# Patient Record
Sex: MALE | Race: WHITE | NOT HISPANIC OR LATINO | Employment: OTHER | URBAN - METROPOLITAN AREA
[De-identification: names, ages, dates, MRNs, and addresses within clinical notes are randomized per-mention and may not be internally consistent; named-entity substitution may affect disease eponyms.]

---

## 2017-04-22 ENCOUNTER — HOSPITAL ENCOUNTER (OUTPATIENT)
Dept: LAB | Facility: MEDICAL CENTER | Age: 72
End: 2017-04-22
Attending: FAMILY MEDICINE
Payer: MEDICARE

## 2017-04-22 LAB
ALBUMIN SERPL BCP-MCNC: 3.8 G/DL (ref 3.2–4.9)
ALBUMIN/GLOB SERPL: 1.6 G/DL
ALP SERPL-CCNC: 43 U/L (ref 30–99)
ALT SERPL-CCNC: 16 U/L (ref 2–50)
ANION GAP SERPL CALC-SCNC: 6 MMOL/L (ref 0–11.9)
ANISOCYTOSIS BLD QL SMEAR: ABNORMAL
AST SERPL-CCNC: 18 U/L (ref 12–45)
BASOPHILS # BLD AUTO: 0 % (ref 0–1.8)
BASOPHILS # BLD: 0 K/UL (ref 0–0.12)
BILIRUB SERPL-MCNC: 1.1 MG/DL (ref 0.1–1.5)
BUN SERPL-MCNC: 22 MG/DL (ref 8–22)
CALCIUM SERPL-MCNC: 8.9 MG/DL (ref 8.5–10.5)
CHLORIDE SERPL-SCNC: 107 MMOL/L (ref 96–112)
CHOLEST SERPL-MCNC: 195 MG/DL (ref 100–199)
CO2 SERPL-SCNC: 27 MMOL/L (ref 20–33)
CREAT SERPL-MCNC: 1.43 MG/DL (ref 0.5–1.4)
EOSINOPHIL # BLD AUTO: 0.08 K/UL (ref 0–0.51)
EOSINOPHIL NFR BLD: 1.8 % (ref 0–6.9)
ERYTHROCYTE [DISTWIDTH] IN BLOOD BY AUTOMATED COUNT: 48 FL (ref 35.9–50)
FERRITIN SERPL-MCNC: 24.2 NG/ML (ref 22–322)
GFR SERPL CREATININE-BSD FRML MDRD: 49 ML/MIN/1.73 M 2
GLOBULIN SER CALC-MCNC: 2.4 G/DL (ref 1.9–3.5)
GLUCOSE SERPL-MCNC: 94 MG/DL (ref 65–99)
HCT VFR BLD AUTO: 41.8 % (ref 42–52)
HDLC SERPL-MCNC: 81 MG/DL
HGB BLD-MCNC: 13.8 G/DL (ref 14–18)
IMM GRANULOCYTES # BLD AUTO: 0.01 K/UL (ref 0–0.11)
IMM GRANULOCYTES NFR BLD AUTO: 0.2 % (ref 0–0.9)
IRON SERPL-MCNC: 227 UG/DL (ref 50–180)
LDLC SERPL CALC-MCNC: 100 MG/DL
LYMPHOCYTES # BLD AUTO: 1.27 K/UL (ref 1–4.8)
LYMPHOCYTES NFR BLD: 27.7 % (ref 22–41)
MACROCYTES BLD QL SMEAR: ABNORMAL
MANUAL DIFF BLD: NORMAL
MCH RBC QN AUTO: 31.9 PG (ref 27–33)
MCHC RBC AUTO-ENTMCNC: 33 G/DL (ref 33.7–35.3)
MCV RBC AUTO: 96.5 FL (ref 81.4–97.8)
MONOCYTES # BLD AUTO: 0.12 K/UL (ref 0–0.85)
MONOCYTES NFR BLD AUTO: 2.7 % (ref 0–13.4)
NEUTROPHILS # BLD AUTO: 2.72 K/UL (ref 1.82–7.42)
NEUTROPHILS NFR BLD: 67.8 % (ref 44–72)
NRBC # BLD AUTO: 0 K/UL
NRBC BLD AUTO-RTO: 0 /100 WBC
PLATELET # BLD AUTO: 273 K/UL (ref 164–446)
PMV BLD AUTO: 10.6 FL (ref 9–12.9)
POTASSIUM SERPL-SCNC: 4.1 MMOL/L (ref 3.6–5.5)
PROT SERPL-MCNC: 6.2 G/DL (ref 6–8.2)
RBC # BLD AUTO: 4.33 M/UL (ref 4.7–6.1)
SODIUM SERPL-SCNC: 140 MMOL/L (ref 135–145)
T4 FREE SERPL-MCNC: 1.43 NG/DL (ref 0.53–1.43)
TRIGL SERPL-MCNC: 68 MG/DL (ref 0–149)
TSH SERPL DL<=0.005 MIU/L-ACNC: 3.72 UIU/ML (ref 0.3–3.7)
VIT B12 SERPL-MCNC: 290 PG/ML (ref 211–911)
WBC # BLD AUTO: 4.6 K/UL (ref 4.8–10.8)

## 2017-04-22 PROCEDURE — 36415 COLL VENOUS BLD VENIPUNCTURE: CPT

## 2017-04-22 PROCEDURE — 83540 ASSAY OF IRON: CPT

## 2017-04-22 PROCEDURE — 85007 BL SMEAR W/DIFF WBC COUNT: CPT

## 2017-04-22 PROCEDURE — 84443 ASSAY THYROID STIM HORMONE: CPT

## 2017-04-22 PROCEDURE — 82607 VITAMIN B-12: CPT

## 2017-04-22 PROCEDURE — 80053 COMPREHEN METABOLIC PANEL: CPT

## 2017-04-22 PROCEDURE — 82728 ASSAY OF FERRITIN: CPT

## 2017-04-22 PROCEDURE — 80061 LIPID PANEL: CPT

## 2017-04-22 PROCEDURE — 84439 ASSAY OF FREE THYROXINE: CPT

## 2017-04-22 PROCEDURE — 85027 COMPLETE CBC AUTOMATED: CPT

## 2017-05-03 ENCOUNTER — HOSPITAL ENCOUNTER (OUTPATIENT)
Facility: MEDICAL CENTER | Age: 72
End: 2017-05-03
Attending: FAMILY MEDICINE
Payer: MEDICARE

## 2017-05-25 ENCOUNTER — HOSPITAL ENCOUNTER (OUTPATIENT)
Dept: LAB | Facility: MEDICAL CENTER | Age: 72
End: 2017-05-25
Attending: FAMILY MEDICINE
Payer: MEDICARE

## 2017-05-25 ENCOUNTER — OFFICE VISIT (OUTPATIENT)
Dept: CARDIOLOGY | Facility: MEDICAL CENTER | Age: 72
End: 2017-05-25
Payer: MEDICARE

## 2017-05-25 VITALS
DIASTOLIC BLOOD PRESSURE: 80 MMHG | BODY MASS INDEX: 25.67 KG/M2 | HEIGHT: 74 IN | SYSTOLIC BLOOD PRESSURE: 150 MMHG | WEIGHT: 200 LBS | HEART RATE: 60 BPM | OXYGEN SATURATION: 96 %

## 2017-05-25 DIAGNOSIS — I10 ESSENTIAL HYPERTENSION: ICD-10-CM

## 2017-05-25 DIAGNOSIS — I48.19 PERSISTENT ATRIAL FIBRILLATION (HCC): ICD-10-CM

## 2017-05-25 DIAGNOSIS — I77.810 AORTIC ECTASIA, THORACIC (HCC): ICD-10-CM

## 2017-05-25 LAB
BASOPHILS # BLD AUTO: 0.5 % (ref 0–1.8)
BASOPHILS # BLD: 0.03 K/UL (ref 0–0.12)
EOSINOPHIL # BLD AUTO: 0.16 K/UL (ref 0–0.51)
EOSINOPHIL NFR BLD: 2.8 % (ref 0–6.9)
ERYTHROCYTE [DISTWIDTH] IN BLOOD BY AUTOMATED COUNT: 47.8 FL (ref 35.9–50)
ERYTHROCYTE [SEDIMENTATION RATE] IN BLOOD BY WESTERGREN METHOD: 3 MM/HOUR (ref 0–20)
FERRITIN SERPL-MCNC: 22 NG/ML (ref 22–322)
HCT VFR BLD AUTO: 38.4 % (ref 42–52)
HGB BLD-MCNC: 12.8 G/DL (ref 14–18)
IMM GRANULOCYTES # BLD AUTO: 0.03 K/UL (ref 0–0.11)
IMM GRANULOCYTES NFR BLD AUTO: 0.5 % (ref 0–0.9)
IRON SATN MFR SERPL: 27 % (ref 15–55)
IRON SERPL-MCNC: 91 UG/DL (ref 50–180)
LYMPHOCYTES # BLD AUTO: 0.95 K/UL (ref 1–4.8)
LYMPHOCYTES NFR BLD: 16.8 % (ref 22–41)
MCH RBC QN AUTO: 32.4 PG (ref 27–33)
MCHC RBC AUTO-ENTMCNC: 33.3 G/DL (ref 33.7–35.3)
MCV RBC AUTO: 97.2 FL (ref 81.4–97.8)
MONOCYTES # BLD AUTO: 0.37 K/UL (ref 0–0.85)
MONOCYTES NFR BLD AUTO: 6.5 % (ref 0–13.4)
NEUTROPHILS # BLD AUTO: 4.13 K/UL (ref 1.82–7.42)
NEUTROPHILS NFR BLD: 72.9 % (ref 44–72)
NRBC # BLD AUTO: 0 K/UL
NRBC BLD AUTO-RTO: 0 /100 WBC
PLATELET # BLD AUTO: 252 K/UL (ref 164–446)
PMV BLD AUTO: 10.9 FL (ref 9–12.9)
RBC # BLD AUTO: 3.95 M/UL (ref 4.7–6.1)
TIBC SERPL-MCNC: 333 UG/DL (ref 250–450)
WBC # BLD AUTO: 5.7 K/UL (ref 4.8–10.8)

## 2017-05-25 PROCEDURE — 83550 IRON BINDING TEST: CPT

## 2017-05-25 PROCEDURE — 4040F PNEUMOC VAC/ADMIN/RCVD: CPT | Mod: 8P | Performed by: INTERNAL MEDICINE

## 2017-05-25 PROCEDURE — 99214 OFFICE O/P EST MOD 30 MIN: CPT | Performed by: INTERNAL MEDICINE

## 2017-05-25 PROCEDURE — 3017F COLORECTAL CA SCREEN DOC REV: CPT | Mod: 8P | Performed by: INTERNAL MEDICINE

## 2017-05-25 PROCEDURE — 85025 COMPLETE CBC W/AUTO DIFF WBC: CPT

## 2017-05-25 PROCEDURE — 83540 ASSAY OF IRON: CPT

## 2017-05-25 PROCEDURE — 85652 RBC SED RATE AUTOMATED: CPT

## 2017-05-25 PROCEDURE — G8432 DEP SCR NOT DOC, RNG: HCPCS | Performed by: INTERNAL MEDICINE

## 2017-05-25 PROCEDURE — 1101F PT FALLS ASSESS-DOCD LE1/YR: CPT | Mod: 8P | Performed by: INTERNAL MEDICINE

## 2017-05-25 PROCEDURE — 1036F TOBACCO NON-USER: CPT | Performed by: INTERNAL MEDICINE

## 2017-05-25 PROCEDURE — 82728 ASSAY OF FERRITIN: CPT

## 2017-05-25 PROCEDURE — 36415 COLL VENOUS BLD VENIPUNCTURE: CPT

## 2017-05-25 PROCEDURE — G8419 CALC BMI OUT NRM PARAM NOF/U: HCPCS | Performed by: INTERNAL MEDICINE

## 2017-05-25 RX ORDER — MELOXICAM 15 MG/1
15 TABLET ORAL DAILY
COMMUNITY
End: 2018-06-25

## 2017-05-25 ASSESSMENT — ENCOUNTER SYMPTOMS
WEIGHT LOSS: 0
NERVOUS/ANXIOUS: 0
NAUSEA: 0
DIZZINESS: 0
COUGH: 0
HEARTBURN: 0
LOSS OF CONSCIOUSNESS: 0
SHORTNESS OF BREATH: 0
HEADACHES: 0
INSOMNIA: 0
BRUISES/BLEEDS EASILY: 1
MYALGIAS: 0

## 2017-05-25 NOTE — PROGRESS NOTES
Subjective:   Giovany Kruger is a 72 y.o. male who presents today in follow-up in regards to his aortic ectasia in the setting of persistent atrial fibrillation and hypertension    Doing very well, leading to [place]  His home blood pressures are always less than 130 and he has been watching his diet, somewhat stressed out today, he has carpal tunnel syndrome and has been moving a lot. Did take his medicines    No setbacks, still walking a lot and hiking    Past Medical History   Diagnosis Date   • Hypertension    • Arrhythmia    • ASTHMA    • Stroke (CMS-HCC) 1980   • Unspecified disorder of thyroid    • Atrial fibrillation (CMS-[facility])    • Peripheral vascular disease, unspecified (CMS-[facility])      Past Surgical History   Procedure Laterality Date   • Other  1980     C1-2-3 fusion   • Other  2009     L3-4-5 susion   • Tonsillectomy  1952   • Inguinal hernia repair  11/3/2011     Performed by RIGOBERTO MCINTYRE at SURGERY [facility]   • Inguinal hernia repair  9/5/2012     Performed by RIGOBERTO MCINTYRE at SURGERY [facility] ORS     Family History   Problem Relation Age of Onset   • Cancer       History   Smoking status   • Former Smoker -- 1.00 packs/day for 20 years   • Types: Cigarettes   • Quit date: 01/01/1980   Smokeless tobacco   • Never Used     Allergies   Allergen Reactions   • Tape      Blisters and scarring     Outpatient Encounter Prescriptions as of 5/25/2017   Medication Sig Dispense Refill   • meloxicam (MOBIC) 15 MG tablet Take 15 mg by mouth every day.     • amiodarone (CORDARONE) 200 MG Tab Take 1 Tab by mouth every day. 90 Tab 3   • levothyroxine (SYNTHROID) 88 MCG Tab Take 88 mcg by mouth Every morning on an empty stomach.     • Glucosamine-Chondroit-Vit C-Mn (GLUCOSAMINE 1500 COMPLEX) Cap Take  by mouth.     • losartan-hydrochlorothiazide (HYZAAR) 50-12.5 MG per tablet Take 1 Tab by mouth every day.     • Fluticasone Propionate, Inhal, (FLOVENT DISKUS) 100 MCG/BLIST AEPB Inhale  by mouth.    "  • Multiple Vitamin (MULTIVITAMIN PO) Take  by mouth every day.     • aspirin (ASA) 325 MG TABS Take 325 mg by mouth every day.       No facility-administered encounter medications on file as of 5/25/2017.     Review of Systems   Constitutional: Negative for weight loss and malaise/fatigue.   HENT: Negative for congestion.    Respiratory: Negative for cough and shortness of breath.    Cardiovascular: Negative for chest pain and leg swelling.   Gastrointestinal: Negative for heartburn and nausea.   Musculoskeletal: Negative for myalgias (taking glucosamine which helps) and joint pain.   Neurological: Negative for dizziness, loss of consciousness and headaches.   Endo/Heme/Allergies: Bruises/bleeds easily.   Psychiatric/Behavioral: The patient is not nervous/anxious and does not have insomnia.    All other systems reviewed and are negative.       Objective:   /80 mmHg  Pulse 60  Ht 1.88 m (6' 2\")  Wt 90.719 kg (200 lb)  BMI 25.67 kg/m2  SpO2 96%    Physical Exam   Constitutional: He is oriented to person, place, and time. He appears well-nourished.   HENT:   Head: Normocephalic and atraumatic.   Eyes: EOM are normal. Pupils are equal, round, and reactive to light.   Neck: No JVD present. No tracheal deviation present.   Cardiovascular: Regular rhythm and intact distal pulses.    No murmur heard.  Pulmonary/Chest: Effort normal and breath sounds normal. No respiratory distress.   Musculoskeletal: Normal range of motion. He exhibits no edema.   Neurological: He is alert and oriented to person, place, and time.   Skin: Skin is warm and dry.   Psychiatric: He has a normal mood and affect. His behavior is normal.       Assessment:     1. Aortic ectasia, thoracic (CMS-HCC)     2. Persistent atrial fibrillation (CMS-[facility])     3. Essential hypertension         Medical Decision Making:  Today's Assessment / Status / Plan:     Aortic aneurysm, we reviewed his echocardiogram together and the aortic root and ascending " aorta are well seen, . We also reviewed his CAT scan from 2012. He has had no interval change in the imaging quality is quite good. We talked about strict blood pressure control and diet again.  Echo 2018    Hyperlipidemia, in the setting of aortic ectasia. His lipid panel has been excellent and his LDL was then less than 70. He is not tolerated high-dose statins, or any statins and has been maintained on fish oil and aspirin. He has not had any severe bleeding or bruising. It would be reasonable to take a baby aspirin if he needs    Atrial fibrillation, in remission on antiarrhythmic therapy. He is due for screening 2018 and this was ordered. TSH, LFTs and chest x-ray reviewed from this year. He gets his eyes checked annually    RTC 1y with testing

## 2017-05-25 NOTE — MR AVS SNAPSHOT
"        Erwinmary ODTY Saskia   2017 10:15 AM   Office Visit   MRN: [id number]    Department:  [facility]   Dept Phone:  [phone]    Description:  Male : 1945   Provider:  Hyun Mendiola M.D.           Reason for Visit     Follow-Up           Allergies as of 2017     Allergen Noted Reactions    Tape 2012       Blisters and scarring      Vital Signs     Blood Pressure Pulse Height Weight Body Mass Index Oxygen Saturation    150/80 mmHg 60 1.88 m (6' 2\") 90.719 kg (200 lb) 25.67 kg/m2 96%    Smoking Status                   Former Smoker           Basic Information     Date Of Birth Sex Race Ethnicity Preferred Language    1945 Male White Non- English      Problem List              ICD-10-CM Priority Class Noted - Resolved    Atrial fibrillation, amio  I48.91   2011 - Present    Osteoarthritis, spinal fusion  M19.90   2011 - Present    Aortic ectasia, thoracic (CMS-HCC) I77.810   2011 - Present    HTN (hypertension) I10   2012 - Present    Inguinal hernia K40.90   2012 - Present    Hypothyroid E03.9   2014 - Present      Health Maintenance        Date Due Completion Dates    IMM DTaP/Tdap/Td Vaccine (1 - Tdap) 1965 ---    COLONOSCOPY 1996 ---    IMM ZOSTER VACCINE 2006 ---    IMM PNEUMOCOCCAL 65+ (ADULT) LOW/MEDIUM RISK SERIES (1 of 2 - PCV13) 2011 ---            Current Immunizations     No immunizations on file.      Below and/or attached are the medications your provider expects you to take. Review all of your home medications and newly ordered medications with your provider and/or pharmacist. Follow medication instructions as directed by your provider and/or pharmacist. Please keep your medication list with you and share with your provider. Update the information when medications are discontinued, doses are changed, or new medications (including over-the-counter products) are added; and carry medication " information at all times in the event of emergency situations     Allergies:  TAPE - (reactions not documented)               Medications  Valid as of: May 25, 2017 - 10:50 AM    Generic Name Brand Name Tablet Size Instructions for use    Amiodarone HCl (Tab) CORDARONE 200 MG Take 1 Tab by mouth every day.        Aspirin (Tab)  MG Take 325 mg by mouth every day.        Fluticasone Propionate (Inhal) (AEROSOL POWDER, BREATH ACTIVATED) Fluticasone Propionate (Inhal) 100 MCG/BLIST Inhale  by mouth.        Glucosamine-Chondroit-Vit C-Mn (Cap) GLUCOSAMINE 1500 COMPLEX  Take  by mouth.        Levothyroxine Sodium (Tab) SYNTHROID 88 MCG Take 88 mcg by mouth Every morning on an empty stomach.        Losartan Potassium-HCTZ (Tab) HYZAAR 50-12.5 MG Take 1 Tab by mouth every day.        Meloxicam (Tab) MOBIC 15 MG Take 15 mg by mouth every day.        Multiple Vitamins-Minerals   Take  by mouth every day.        .                 Medicines prescribed today were sent to:     [facility] [address] [place] [address]), [place] - [address] () [place] [address]    Phone: [phone] Fax: [phone]    Open 24 Hours?: No    [facility] - [facility] [address] [facility]    [address] [facility] [address]    Phone: [phone] Fax: [phone]    Open 24 Hours?: No      Medication refill instructions:       If your prescription bottle indicates you have medication refills left, it is not necessary to call your provider’s office. Please contact your pharmacy and they will refill your medication.    If your prescription bottle indicates you do not have any refills left, you may request refills at any time through one of the following ways: The online [employer] system (except Urgent Care), by calling your provider’s office, or by asking your pharmacy to contact your provider’s office with a refill request. Medication refills are processed only during regular business hours and  may not be available until the next business day. Your provider may request additional information or to have a follow-up visit with you prior to refilling your medication.   *Please Note: Medication refills are assigned a new Rx number when refilled electronically. Your pharmacy may indicate that no refills were authorized even though a new prescription for the same medication is available at the pharmacy. Please request the medicine by name with the pharmacy before contacting your provider for a refill.           [employer] Access Code: Activation code not generated  Current [facility] Status: Active

## 2017-05-26 ENCOUNTER — HOSPITAL ENCOUNTER (OUTPATIENT)
Facility: MEDICAL CENTER | Age: 72
End: 2017-05-26
Attending: FAMILY MEDICINE
Payer: MEDICARE

## 2017-05-26 LAB — HEMOCCULT STL QL: NEGATIVE

## 2017-05-26 PROCEDURE — 82272 OCCULT BLD FECES 1-3 TESTS: CPT

## 2018-01-11 ENCOUNTER — HOSPITAL ENCOUNTER (OUTPATIENT)
Dept: LAB | Facility: MEDICAL CENTER | Age: 73
End: 2018-01-11
Attending: FAMILY MEDICINE
Payer: MEDICARE

## 2018-01-11 LAB
ALBUMIN SERPL BCP-MCNC: 3.9 G/DL (ref 3.2–4.9)
ALBUMIN/GLOB SERPL: 1.6 G/DL
ALP SERPL-CCNC: 46 U/L (ref 30–99)
ALT SERPL-CCNC: 17 U/L (ref 2–50)
ANION GAP SERPL CALC-SCNC: 5 MMOL/L (ref 0–11.9)
AST SERPL-CCNC: 17 U/L (ref 12–45)
BASOPHILS # BLD AUTO: 0.5 % (ref 0–1.8)
BASOPHILS # BLD: 0.03 K/UL (ref 0–0.12)
BILIRUB SERPL-MCNC: 0.6 MG/DL (ref 0.1–1.5)
BNP SERPL-MCNC: 52 PG/ML (ref 0–100)
BUN SERPL-MCNC: 23 MG/DL (ref 8–22)
CALCIUM SERPL-MCNC: 9 MG/DL (ref 8.5–10.5)
CHLORIDE SERPL-SCNC: 105 MMOL/L (ref 96–112)
CO2 SERPL-SCNC: 28 MMOL/L (ref 20–33)
CREAT SERPL-MCNC: 1.48 MG/DL (ref 0.5–1.4)
EOSINOPHIL # BLD AUTO: 0.22 K/UL (ref 0–0.51)
EOSINOPHIL NFR BLD: 3.5 % (ref 0–6.9)
ERYTHROCYTE [DISTWIDTH] IN BLOOD BY AUTOMATED COUNT: 47.8 FL (ref 35.9–50)
GLOBULIN SER CALC-MCNC: 2.5 G/DL (ref 1.9–3.5)
GLUCOSE SERPL-MCNC: 104 MG/DL (ref 65–99)
HCT VFR BLD AUTO: 41.4 % (ref 42–52)
HGB BLD-MCNC: 13.8 G/DL (ref 14–18)
IMM GRANULOCYTES # BLD AUTO: 0.02 K/UL (ref 0–0.11)
IMM GRANULOCYTES NFR BLD AUTO: 0.3 % (ref 0–0.9)
LYMPHOCYTES # BLD AUTO: 1.15 K/UL (ref 1–4.8)
LYMPHOCYTES NFR BLD: 18.5 % (ref 22–41)
MCH RBC QN AUTO: 32.1 PG (ref 27–33)
MCHC RBC AUTO-ENTMCNC: 33.3 G/DL (ref 33.7–35.3)
MCV RBC AUTO: 96.3 FL (ref 81.4–97.8)
MONOCYTES # BLD AUTO: 0.4 K/UL (ref 0–0.85)
MONOCYTES NFR BLD AUTO: 6.5 % (ref 0–13.4)
NEUTROPHILS # BLD AUTO: 4.38 K/UL (ref 1.82–7.42)
NEUTROPHILS NFR BLD: 70.7 % (ref 44–72)
NRBC # BLD AUTO: 0 K/UL
NRBC BLD-RTO: 0 /100 WBC
PLATELET # BLD AUTO: 295 K/UL (ref 164–446)
PMV BLD AUTO: 10.5 FL (ref 9–12.9)
POTASSIUM SERPL-SCNC: 4 MMOL/L (ref 3.6–5.5)
PROT SERPL-MCNC: 6.4 G/DL (ref 6–8.2)
RBC # BLD AUTO: 4.3 M/UL (ref 4.7–6.1)
SODIUM SERPL-SCNC: 138 MMOL/L (ref 135–145)
WBC # BLD AUTO: 6.2 K/UL (ref 4.8–10.8)

## 2018-01-11 PROCEDURE — 80053 COMPREHEN METABOLIC PANEL: CPT

## 2018-01-11 PROCEDURE — 83880 ASSAY OF NATRIURETIC PEPTIDE: CPT | Mod: GZ

## 2018-01-11 PROCEDURE — 36415 COLL VENOUS BLD VENIPUNCTURE: CPT | Mod: GZ

## 2018-01-11 PROCEDURE — 84439 ASSAY OF FREE THYROXINE: CPT

## 2018-01-11 PROCEDURE — 82306 VITAMIN D 25 HYDROXY: CPT | Mod: GZ

## 2018-01-11 PROCEDURE — 82607 VITAMIN B-12: CPT

## 2018-01-11 PROCEDURE — 82728 ASSAY OF FERRITIN: CPT

## 2018-01-11 PROCEDURE — 85025 COMPLETE CBC W/AUTO DIFF WBC: CPT

## 2018-01-12 LAB
25(OH)D3 SERPL-MCNC: 33 NG/ML (ref 30–100)
FERRITIN SERPL-MCNC: 28.5 NG/ML (ref 22–322)
T4 FREE SERPL-MCNC: 1.24 NG/DL (ref 0.53–1.43)
VIT B12 SERPL-MCNC: 321 PG/ML (ref 211–911)

## 2018-01-24 ENCOUNTER — TELEPHONE (OUTPATIENT)
Dept: CARDIOLOGY | Facility: MEDICAL CENTER | Age: 73
End: 2018-01-24

## 2018-01-24 ENCOUNTER — HOSPITAL ENCOUNTER (OUTPATIENT)
Dept: RADIOLOGY | Facility: MEDICAL CENTER | Age: 73
End: 2018-01-24
Attending: FAMILY MEDICINE
Payer: MEDICARE

## 2018-01-24 ENCOUNTER — HOSPITAL ENCOUNTER (OUTPATIENT)
Dept: CARDIOLOGY | Facility: MEDICAL CENTER | Age: 73
End: 2018-01-24
Attending: FAMILY MEDICINE
Payer: MEDICARE

## 2018-01-24 DIAGNOSIS — R06.9 UNSPECIFIED ABNORMALITIES OF BREATHING: ICD-10-CM

## 2018-01-24 DIAGNOSIS — R06.9 ABNORMAL RESPIRATORY RATE: ICD-10-CM

## 2018-01-24 LAB
LV EJECT FRACT  99904: 65
LV EJECT FRACT MOD 2C 99903: 80.41
LV EJECT FRACT MOD 4C 99902: 77.26
LV EJECT FRACT MOD BP 99901: 78.04

## 2018-01-24 PROCEDURE — 93306 TTE W/DOPPLER COMPLETE: CPT | Mod: 26 | Performed by: INTERNAL MEDICINE

## 2018-01-24 PROCEDURE — 71046 X-RAY EXAM CHEST 2 VIEWS: CPT

## 2018-01-24 PROCEDURE — 93306 TTE W/DOPPLER COMPLETE: CPT

## 2018-02-05 ENCOUNTER — OFFICE VISIT (OUTPATIENT)
Dept: PULMONOLOGY | Facility: HOSPICE | Age: 73
End: 2018-02-05
Payer: MEDICARE

## 2018-02-05 VITALS
SYSTOLIC BLOOD PRESSURE: 112 MMHG | TEMPERATURE: 97.8 F | HEIGHT: 74 IN | WEIGHT: 194.8 LBS | OXYGEN SATURATION: 97 % | DIASTOLIC BLOOD PRESSURE: 68 MMHG | BODY MASS INDEX: 25 KG/M2 | HEART RATE: 64 BPM | RESPIRATION RATE: 16 BRPM

## 2018-02-05 DIAGNOSIS — R06.02 SOB (SHORTNESS OF BREATH): ICD-10-CM

## 2018-02-05 PROCEDURE — 99204 OFFICE O/P NEW MOD 45 MIN: CPT | Performed by: INTERNAL MEDICINE

## 2018-02-05 RX ORDER — PNV NO.95/FERROUS FUM/FOLIC AC 28MG-0.8MG
325 TABLET ORAL DAILY
COMMUNITY

## 2018-02-05 NOTE — PROGRESS NOTES
CC:  Chief Complaint   Patient presents with   • New Patient     Referred by Dr. Helton for breathing abnormalities     Shortness of breath on exertion     HPI:   The patient is a 72 y.o. male with history of asthma was referred to our clinic by his primary care physician for dyspnea on exertion. The patient always has had some shortness of breath on exertion, for the last 3 months or so has dyspnea on exertion has got much worse. The patient will get winded climbing one flight of stairs. He has some intermittent cough but no wheezing with this. The patient takes Flovent 100 for asthma.  The patient has atrial fibrillation. She he is on amiodarone for rhythm control and an aspirin. Echocardiogram done a few weeks ago was unremarkable with estimated right ventricular systolic pressure of 30 mmHg. And no diastolic dysfunction. The patient denies orthopnea or paroxysmal nocturnal dyspnea. No snoring and no witnessed sleep apneas.  ROS:   Constitutional: Denies fevers, chills, night sweats  Eyes: Denies vision loss, pain, drainage, double vision  Ears, Nose, Throat: Denies earache, difficulty hearing, tinnitus, nasal congestion, hoarseness  Cardiovascular: Denies chest pain, tightness, palpitations, orthopnea or edema  Respiratory: Please see history of present illness  Sleep: Please see history of present illness  GI: Denies heartburn, dysphagia, nausea, abdominal pain, diarrhea or constipation  : Denies frequent urination, hematuria, discharge or painful urination  Musculoskeletal: Denies back pain, painful joints, sore muscles  Neurological: Denies weakness or headaches  Skin: No rashes  All other ROS were negative except what mentioned in the HPI     Past Medical History:  Past Medical History:   Diagnosis Date   • Arrhythmia    • ASTHMA    • Atrial fibrillation (CMS-HCC)    • Back pain    • Chickenpox    • Yi measles    • Hypertension    • Peripheral vascular disease, unspecified    • Stroke (CMS-HCC) 1980  "  • Tonsillitis    • Unspecified disorder of thyroid                Social History:  Social History     Social History   • Marital status:      Spouse name: N/A   • Number of children: N/A   • Years of education: N/A     Occupational History   • Not on file.     Social History Main Topics   • Smoking status: Former Smoker     Packs/day: 1.00     Years: 20.00     Types: Cigarettes     Quit date: 1/1/1980   • Smokeless tobacco: Never Used   • Alcohol use 4.2 - 16.8 oz/week     7 Standard drinks or equivalent per week      Comment: 7 DRINKS PER WEEK   • Drug use: No   • Sexual activity: Not on file     Other Topics Concern   • Not on file     Social History Narrative   • No narrative on file             Family History:  Family History   Problem Relation Age of Onset   • Cancer     • No Known Problems Mother    • No Known Problems Father    • Lung Disease Sister    • Cancer Brother        Current Outpatient Prescriptions on File Prior to Visit   Medication Sig Dispense Refill   • amiodarone (CORDARONE) 200 MG Tab Take 1 Tab by mouth every day. 90 Tab 3   • levothyroxine (SYNTHROID) 88 MCG Tab Take 88 mcg by mouth Every morning on an empty stomach.     • Glucosamine-Chondroit-Vit C-Mn (GLUCOSAMINE 1500 COMPLEX) Cap Take  by mouth.     • losartan-hydrochlorothiazide (HYZAAR) 50-12.5 MG per tablet Take 1 Tab by mouth every day.     • Fluticasone Propionate, Inhal, (FLOVENT DISKUS) 100 MCG/BLIST AEPB Inhale  by mouth.     • Multiple Vitamin (MULTIVITAMIN PO) Take  by mouth every day.     • aspirin (ASA) 325 MG TABS Take 325 mg by mouth every day.     • meloxicam (MOBIC) 15 MG tablet Take 15 mg by mouth every day.       No current facility-administered medications on file prior to visit.        Allergies:   Tape        Vitals:    02/05/18 1513   Height: 1.88 m (6' 2\")   Weight: 88.4 kg (194 lb 12.8 oz)   Weight % change since last entry.: 0 %   BP: 112/68   Pulse: 64   BMI (Calculated): 25.01   Resp: 16   Temp: 36.6 " °C (97.8 °F)   O2 sat % room air: 97 %           Physical Exam:  Appearance:  in no acute distress  HEENT: Normocephalic, atraumatic, white sclera, PERRLA, oropharynx clear  Neck: No adenopathy or masses  Respiratory: no intercostal retractions or accessory muscle use  Lungs auscultation: Clear to auscultation bilaterally  Cardiovascular: Regular rate rhythm. No murmurs, rubs or gallops.  No LE edema  Abdomen: soft, nondistended  Gait: Normal  Digits: No clubbing, cyanosis  Motor: No focal deficits  Orientation: Oriented to time, person and place      DATA:    Labs:  Lab Results   Component Value Date/Time    WBC 6.2 01/11/2018 11:54 AM    RBC 4.30 (L) 01/11/2018 11:54 AM    HEMOGLOBIN 13.8 (L) 01/11/2018 11:54 AM    HEMATOCRIT 41.4 (L) 01/11/2018 11:54 AM    MCV 96.3 01/11/2018 11:54 AM    MCH 32.1 01/11/2018 11:54 AM    MCHC 33.3 (L) 01/11/2018 11:54 AM    MPV 10.5 01/11/2018 11:54 AM    NEUTSPOLYS 70.70 01/11/2018 11:54 AM    LYMPHOCYTES 18.50 (L) 01/11/2018 11:54 AM    MONOCYTES 6.50 01/11/2018 11:54 AM    EOSINOPHILS 3.50 01/11/2018 11:54 AM    BASOPHILS 0.50 01/11/2018 11:54 AM    ANISOCYTOSIS 1+ 04/22/2017 07:47 AM      Lab Results   Component Value Date/Time    SODIUM 138 01/11/2018 11:54 AM    POTASSIUM 4.0 01/11/2018 11:54 AM    CHLORIDE 105 01/11/2018 11:54 AM    CO2 28 01/11/2018 11:54 AM    GLUCOSE 104 (H) 01/11/2018 11:54 AM    BUN 23 (H) 01/11/2018 11:54 AM    CREATININE 1.48 (H) 01/11/2018 11:54 AM    BUNCREATRAT 16 12/23/2016 08:24 AM        Imaging:  Chest x-ray was personally reviewed and showed hyperinflation.  ECHOCARDIOGRAM:  Echocardiogram done recently showed estimated right ventricular systolic pressure of 30 mmHg normal ejection fraction and no diastolic dysfunction.        Diagnosis:  1. SOB (shortness of breath)  AMB PULMONARY FUNCTION TEST/LAB    AMB EXERCISE TEST FOR BRONCHOSPASM/6 MIN WALK        Assessment and Plan   The patient has dyspnea on exertion with mild cough and no wheezing.  Chest x-ray showed hyperinflation. The patient has history of remote smoking. He smoked for about 25 years. He quit in 1980s. 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  The patient probably has COPD. to confirm the diagnosis and quantify the severity of the disease continues to do pulmonary function test. I'm going also to check 6 minute walking test to evaluate for exertional hypoxemia.  We will see the patient in 4 weeks after these studies are done.                    Return in about 4 weeks (around 3/5/2018) for With PFT.        This note was created using voice recognition software. I apologize for any overlooked obvious grammar or  vocabulary mistake

## 2018-02-26 ENCOUNTER — NON-PROVIDER VISIT (OUTPATIENT)
Dept: PULMONOLOGY | Facility: HOSPICE | Age: 73
End: 2018-02-26
Payer: MEDICARE

## 2018-02-26 ENCOUNTER — NON-PROVIDER VISIT (OUTPATIENT)
Dept: PULMONOLOGY | Facility: HOSPICE | Age: 73
End: 2018-02-26
Attending: INTERNAL MEDICINE
Payer: MEDICARE

## 2018-02-26 VITALS — HEIGHT: 74 IN | BODY MASS INDEX: 25.41 KG/M2 | WEIGHT: 198 LBS

## 2018-02-26 DIAGNOSIS — R06.02 SOB (SHORTNESS OF BREATH): ICD-10-CM

## 2018-02-26 PROCEDURE — 94726 PLETHYSMOGRAPHY LUNG VOLUMES: CPT | Performed by: INTERNAL MEDICINE

## 2018-02-26 PROCEDURE — 94618 PULMONARY STRESS TESTING: CPT | Performed by: INTERNAL MEDICINE

## 2018-02-26 PROCEDURE — 94060 EVALUATION OF WHEEZING: CPT | Mod: 59 | Performed by: INTERNAL MEDICINE

## 2018-02-26 PROCEDURE — 94729 DIFFUSING CAPACITY: CPT | Performed by: INTERNAL MEDICINE

## 2018-02-26 ASSESSMENT — PULMONARY FUNCTION TESTS
FEV1_LLN: 3.10
FEV1/FVC_PERCENT_PREDICTED: 100
FEV1/FVC_PERCENT_PREDICTED: 99
FEV1_PERCENT_PREDICTED: 94
FEV1_PERCENT_CHANGE: 1
FVC_LLN: 4.22
FEV1/FVC_PERCENT_CHANGE: 300
FEV1: 3.63
FEV1/FVC: 72
FEV1_PERCENT_CHANGE: 3
FEV1/FVC: 73.48
FEV1/FVC: 73
FEV1/FVC_PERCENT_LLN: 61
FEV1/FVC_PERCENT_PREDICTED: 73
FVC_PERCENT_PREDICTED: 97
FEV1/FVC_PERCENT_CHANGE: 1
FVC_PERCENT_PREDICTED: 96
FEV1_PERCENT_PREDICTED: 97
FEV1/FVC_PERCENT_PREDICTED: 100
FVC: 4.85
FEV1/FVC_PERCENT_PREDICTED: 98
FEV1/FVC: 72
FEV1: 3.51
FEV1_PREDICTED: 3.71
FEV1/FVC_PREDICTED: 73
FVC: 4.94
FVC_PREDICTED: 5.05

## 2018-02-26 ASSESSMENT — 6 MINUTE WALK TEST (6MWT)
AMBULATES WITH O2: WITHOUT O2
HEART RATE: 60
HEART RATE AT 4 MIN: 85
SAO2 AT 2 MIN: 98
HEART RATE AT 2 MIN: 78
NUMBER OF RESTS: 0
PERCEIVED FATIGUE AT 3 MIN: 0
HEART RATE AT 2 MIN: 84
TOTAL REST TIME: 0
PERCEIVED BREATHLESSNESS AT 4 MIN: 0
SAO2 AT 4 MIN: 97
HEART RATE AT 6 MIN: 87
SAO2 AT 3 MIN: 96
PERCEIVED BREATHLESSNESS AT 1 MIN: 0
SAO2 AT 6 MIN: 97
HEART RATE AT 5 MIN: 89
HEART RATE AT 1 MIN: 80
PERCEIVED FATIGUE AT 6 MIN: 0
SAO2 AT 1 MIN: 99
O2 SAT PERCENT ROOM AIR: 99
PERCEIVED FATIGUE AT 1 MIN: 0
BLOOD PRESSURE AT 1 MIN: 120/74
SAO2 AT 2 MIN: 96
PERCENT OF NORMAL WALKED: 51
PERCEIVED FATIGUE AT 4 MIN: 0
SAO2 AT 5 MIN: 97
PERCEIVED FATIGUE AT 5 MIN: 0
SITTING BLOOD PRESSURE: 112/60
SAO2 AT 1 MIN: 97
PERCEIVED FATIGUE AT 2 MIN: 0
PERCEIVED BREATHLESSNESS AT 3 MIN: 0
PERCEIVED BREATHLESSNESS AT 5 MIN: 0
PERCEIVED BREATHLESSNESS AT 1 MIN: 0
PERCEIVED BREATHLESSNESS AT 2 MIN: 0
BLOOD PRESSURE: LEFT ARM
HEART RATE AT 1 MIN: 77
PERCEIVED BREATHLESSNESS AT 2 MIN: 0
PERCEIVED BREATHLESSNESS AT 6 MIN: 0
COMMENTS: TEST ON ROOM AIR.
PERCEIVED FATIGUE AT 1 MIN: 0
HEART RATE AT 3 MIN: 87
PERCEIVED FATIGUE AT 2 MIN: 0

## 2018-02-26 NOTE — PROCEDURES
Test on Room Air.    Patient walked for 6 minutes on room air. No oxygen desaturation problems occurred. The patient walked 451% of his predicted distance.

## 2018-02-26 NOTE — PROCEDURES
Technician Kamryn Marie, [facility] Comments:Good patient effort & cooperation.  The results of this test meet the ATS/ERS standards for acceptability & reproducibility.  Test was performed on the [facility] Body Plethysmograph-Elite DX system.  Predicted values were [facility]-3 for spirometry, [facility] for DLCO, ITS for Lung Volumes.  The DLCO was uncorrected for Hgb.  A bronchodilator of Ventolin HFA -2puffs via spacer administered.  DLCO performed during dilation period.    Interpretation:    FEV1 is 3.5 mm which is 94% of predicted. FEV1 FVC ratio is normal at 72%. MVV is normal.  There is no significant response to an inhaled bronchodilator.  Lung volumes associated hyperinflation.  Diffusing capacity is normal at 106% of predicted.  Airway resistance is normal.    Presence of hyperinflation suggest the diagnosis of obstructive lung disease. No reversibility is noted on the study.

## 2018-03-05 ENCOUNTER — OFFICE VISIT (OUTPATIENT)
Dept: PULMONOLOGY | Facility: HOSPICE | Age: 73
End: 2018-03-05
Payer: MEDICARE

## 2018-03-05 VITALS
HEIGHT: 74 IN | OXYGEN SATURATION: 97 % | DIASTOLIC BLOOD PRESSURE: 70 MMHG | RESPIRATION RATE: 16 BRPM | WEIGHT: 198.2 LBS | BODY MASS INDEX: 25.44 KG/M2 | TEMPERATURE: 98.1 F | HEART RATE: 53 BPM | SYSTOLIC BLOOD PRESSURE: 116 MMHG

## 2018-03-05 DIAGNOSIS — J45.20 MILD INTERMITTENT ASTHMA WITHOUT COMPLICATION: ICD-10-CM

## 2018-03-05 DIAGNOSIS — J45.30 MILD PERSISTENT ASTHMA WITHOUT COMPLICATION: ICD-10-CM

## 2018-03-05 PROCEDURE — 99214 OFFICE O/P EST MOD 30 MIN: CPT | Performed by: INTERNAL MEDICINE

## 2018-03-05 RX ORDER — BUDESONIDE AND FORMOTEROL FUMARATE DIHYDRATE 80; 4.5 UG/1; UG/1
2 AEROSOL RESPIRATORY (INHALATION) 2 TIMES DAILY
Qty: 3 INHALER | Refills: 3 | Status: SHIPPED | OUTPATIENT
Start: 2018-03-05 | End: 2018-03-06 | Stop reason: SDUPTHER

## 2018-03-05 RX ORDER — BUDESONIDE AND FORMOTEROL FUMARATE DIHYDRATE 80; 4.5 UG/1; UG/1
2 AEROSOL RESPIRATORY (INHALATION) 2 TIMES DAILY
Qty: 1 INHALER | Refills: 0 | Status: SHIPPED | OUTPATIENT
Start: 2018-03-05 | End: 2018-03-05 | Stop reason: SDUPTHER

## 2018-03-05 NOTE — PROGRESS NOTES
CC:  Chief Complaint   Patient presents with   • Shortness of Breath     PFT and 6min walk results     Shortness of breath on exertion. Follow-up on pulmonary function test in 6 minute walking test    HPI:   The patient is a 72 y.o. male with history of asthma on Flovent 100 µg twice a day came today for follow-up. I saw the patient a few weeks ago. His main complaint was dyspnea with minimal activities. He does not have significant cough but has intermittent wheezing. We ordered pulmonary function tests and 6 minute walking test and the patient came today for follow-up.    His pulmonary function tests did not show clear obstruction however it showed severe air trapping with residual volume of 180% predicted. 6 minute walking test was negative for hypoxemia. The patient was able to walk only 51% predicted distance however he told me that he would have been done but her if he was allowed as maximum ability. He did not feel short of breath or fatigue during the test.    Symptomatically, the patient continue to have shortness of breath when he walks his dog or when he go upstairs.  ROS:   Constitutional: Denies fevers, chills, night sweats  Eyes: Denies vision loss, pain, drainage, double vision  Ears, Nose, Throat: Denies earache, difficulty hearing, tinnitus, nasal congestion, hoarseness  Cardiovascular: Denies chest pain, tightness, palpitations, orthopnea or edema  Respiratory: Please see history of present illness  Sleep: There is no signs or symptoms of sleep apnea  GI: Denies heartburn, dysphagia, nausea, abdominal pain, diarrhea or constipation  : Denies frequent urination, hematuria, discharge or painful urination  Musculoskeletal: Denies back pain, painful joints, sore muscles  Neurological: Denies weakness or headaches  Skin: No rashes  All other ROS were negative except what mentioned in the HPI     Past Medical History:  Past Medical History:   Diagnosis Date   • Arrhythmia    • ASTHMA    • Atrial  fibrillation (CMS-HCC)    • Back pain    • Chickenpox    • [place] measles    • Hypertension    • Peripheral vascular disease, unspecified    • Stroke (CMS-HCC) 1980   • Tonsillitis    • Unspecified disorder of thyroid                Social History:  Social History     Social History   • Marital status:      Spouse name: N/A   • Number of children: N/A   • Years of education: N/A     Occupational History   • Not on file.     Social History Main Topics   • Smoking status: Former Smoker     Packs/day: 1.00     Years: 20.00     Types: Cigarettes     Quit date: 1/1/1980   • Smokeless tobacco: Never Used   • Alcohol use 4.2 - 16.8 oz/week     7 Standard drinks or equivalent per week      Comment: 7 DRINKS PER WEEK   • Drug use: No   • Sexual activity: Not on file     Other Topics Concern   • Not on file     Social History Narrative   • No narrative on file       Occupational History   Currently retired    Home Pets   The patient has 2 dogs    Family History:  Family History   Problem Relation Age of Onset   • Cancer     • No Known Problems Mother    • No Known Problems Father    • Lung Disease Sister    • Cancer Brother        Current Outpatient Prescriptions on File Prior to Visit   Medication Sig Dispense Refill   • Omega-3 Fatty Acids (FISH OIL PO) Take  by mouth.     • Ferrous Sulfate (IRON) 325 (65 Fe) MG Tab Take  by mouth.     • meloxicam (MOBIC) 15 MG tablet Take 15 mg by mouth every day.     • amiodarone (CORDARONE) 200 MG Tab Take 1 Tab by mouth every day. 90 Tab 3   • levothyroxine (SYNTHROID) 88 MCG Tab Take 88 mcg by mouth Every morning on an empty stomach.     • Glucosamine-Chondroit-Vit C-Mn (GLUCOSAMINE 1500 COMPLEX) Cap Take  by mouth.     • losartan-hydrochlorothiazide (HYZAAR) 50-12.5 MG per tablet Take 1 Tab by mouth every day.     • Fluticasone Propionate, Inhal, (FLOVENT DISKUS) 100 MCG/BLIST AEPB Inhale  by mouth.     • Multiple Vitamin (MULTIVITAMIN PO) Take  by mouth every day.     •  "aspirin (ASA) 325 MG TABS Take 325 mg by mouth every day.       No current facility-administered medications on file prior to visit.        Allergies:   Tape        Vitals:    03/05/18 1045   Height: 1.88 m (6' 2\")   Weight: 89.9 kg (198 lb 3.2 oz)   Weight % change since last entry.: 0 %   BP: 116/70   Pulse: (!) 53   BMI (Calculated): 25.45   Resp: 16   Temp: 36.7 °C (98.1 °F)   O2 sat % room air: 97 %           Physical Exam:  Appearance:  in no acute distress  HEENT: Normocephalic, atraumatic, white sclera, PERRLA, oropharynx clear  Neck: No adenopathy or masses  Respiratory: no intercostal retractions or accessory muscle use  Lungs auscultation: Clear to auscultation bilaterally  Cardiovascular: Regular rate rhythm. No murmurs, rubs or gallops.  No LE edema  Abdomen: soft, nondistended  Gait: Normal  Digits: No clubbing, cyanosis  Motor: No focal deficits  Orientation: Oriented to time, person and place      DATA:    Labs:  Lab Results   Component Value Date/Time    WBC 6.2 01/11/2018 11:54 AM    RBC 4.30 (L) 01/11/2018 11:54 AM    HEMOGLOBIN 13.8 (L) 01/11/2018 11:54 AM    HEMATOCRIT 41.4 (L) 01/11/2018 11:54 AM    MCV 96.3 01/11/2018 11:54 AM    MCH 32.1 01/11/2018 11:54 AM    MCHC 33.3 (L) 01/11/2018 11:54 AM    MPV 10.5 01/11/2018 11:54 AM    NEUTSPOLYS 70.70 01/11/2018 11:54 AM    LYMPHOCYTES 18.50 (L) 01/11/2018 11:54 AM    MONOCYTES 6.50 01/11/2018 11:54 AM    EOSINOPHILS 3.50 01/11/2018 11:54 AM    BASOPHILS 0.50 01/11/2018 11:54 AM    ANISOCYTOSIS 1+ 04/22/2017 07:47 AM      Lab Results   Component Value Date/Time    SODIUM 138 01/11/2018 11:54 AM    POTASSIUM 4.0 01/11/2018 11:54 AM    CHLORIDE 105 01/11/2018 11:54 AM    CO2 28 01/11/2018 11:54 AM    GLUCOSE 104 (H) 01/11/2018 11:54 AM    BUN 23 (H) 01/11/2018 11:54 AM    CREATININE 1.48 (H) 01/11/2018 11:54 AM    BUNCREATRAT 16 12/23/2016 08:24 AM            PULMONARY FUNCTION TEST:  Please see history of present illness        Diagnosis:  1. Mild " persistent asthma without complication          Assessment and Plan     Problem List Items Addressed This Visit     Mild persistent asthma without complication  The patient's shortness of breath is probably secondary to asthma. He has severe air trapping on pulmonary function tests. Did not have cleared his COPD and his tests. The patient also had unremarkable echocardiogram recently.   The patient takes Flovent twice a day.   We will try to step up the treatment to see if this will help. I gave the patient's prescription for Symbicort 80/4.5. The patient will come back to us in a few months to assess response to treatment.   . He is nonsmoker. His flu vaccine is up-to-date.     Relevant Medications    budesonide-formoterol (SYMBICORT) 80-4.5 MCG/ACT Aerosol                          Return in about 2 months (around 5/5/2018).        This note was created using voice recognition software. I apologize for any overlooked obvious grammar or  vocabulary mistake

## 2018-03-06 RX ORDER — BUDESONIDE AND FORMOTEROL FUMARATE DIHYDRATE 80; 4.5 UG/1; UG/1
2 AEROSOL RESPIRATORY (INHALATION) 2 TIMES DAILY
Qty: 3 INHALER | Refills: 3 | Status: SHIPPED
Start: 2018-03-06 | End: 2018-03-27 | Stop reason: SDUPTHER

## 2018-03-06 NOTE — TELEPHONE ENCOUNTER
Pt is requesting an rx for the Generic form of Symbicort 80/4.5 to be sent to the [facility] pharmacy for the rx is over $300 at his local pharmacy.  Please print on plain paper so that it can be faxed over.    [phone]  [phone]    Last OV: 3/5/18- Dr. Neville  Mild persistent asthma w/o comp

## 2018-03-27 DIAGNOSIS — J45.20 MILD INTERMITTENT ASTHMA WITHOUT COMPLICATION: ICD-10-CM

## 2018-03-27 RX ORDER — BUDESONIDE AND FORMOTEROL FUMARATE DIHYDRATE 80; 4.5 UG/1; UG/1
2 AEROSOL RESPIRATORY (INHALATION) 2 TIMES DAILY
Qty: 1 INHALER | Refills: 0 | Status: SHIPPED | OUTPATIENT
Start: 2018-03-27 | End: 2018-12-28 | Stop reason: SDUPTHER

## 2018-03-27 NOTE — TELEPHONE ENCOUNTER
Patient called to let us know that his prescription for Symbicort 80-4.5 is being sent to him by the [facility] but may not make it to him prior to his inhaler is out. He is requesting to have 1 inhaler sent to the local pharmacy to make sure he does not run out. Please review pended order.

## 2018-04-19 ENCOUNTER — HOSPITAL ENCOUNTER (OUTPATIENT)
Dept: LAB | Facility: MEDICAL CENTER | Age: 73
End: 2018-04-19
Attending: FAMILY MEDICINE
Payer: MEDICARE

## 2018-04-19 LAB
ALBUMIN SERPL BCP-MCNC: 3.9 G/DL (ref 3.2–4.9)
ALBUMIN/GLOB SERPL: 1.6 G/DL
ALP SERPL-CCNC: 50 U/L (ref 30–99)
ALT SERPL-CCNC: 18 U/L (ref 2–50)
ANION GAP SERPL CALC-SCNC: 6 MMOL/L (ref 0–11.9)
AST SERPL-CCNC: 22 U/L (ref 12–45)
BILIRUB SERPL-MCNC: 0.6 MG/DL (ref 0.1–1.5)
BUN SERPL-MCNC: 23 MG/DL (ref 8–22)
CALCIUM SERPL-MCNC: 8.9 MG/DL (ref 8.5–10.5)
CHLORIDE SERPL-SCNC: 106 MMOL/L (ref 96–112)
CO2 SERPL-SCNC: 25 MMOL/L (ref 20–33)
CREAT SERPL-MCNC: 1.53 MG/DL (ref 0.5–1.4)
GLOBULIN SER CALC-MCNC: 2.5 G/DL (ref 1.9–3.5)
GLUCOSE SERPL-MCNC: 89 MG/DL (ref 65–99)
POTASSIUM SERPL-SCNC: 3.7 MMOL/L (ref 3.6–5.5)
PROT SERPL-MCNC: 6.4 G/DL (ref 6–8.2)
PSA SERPL-MCNC: 1.19 NG/ML (ref 0–4)
SODIUM SERPL-SCNC: 137 MMOL/L (ref 135–145)

## 2018-04-19 PROCEDURE — 84153 ASSAY OF PSA TOTAL: CPT | Mod: GA

## 2018-04-19 PROCEDURE — 80053 COMPREHEN METABOLIC PANEL: CPT

## 2018-04-19 PROCEDURE — 36415 COLL VENOUS BLD VENIPUNCTURE: CPT

## 2018-05-09 ENCOUNTER — OFFICE VISIT (OUTPATIENT)
Dept: PULMONOLOGY | Facility: HOSPICE | Age: 73
End: 2018-05-09
Payer: MEDICARE

## 2018-05-09 VITALS
WEIGHT: 195 LBS | HEIGHT: 74 IN | DIASTOLIC BLOOD PRESSURE: 66 MMHG | TEMPERATURE: 97.9 F | HEART RATE: 57 BPM | BODY MASS INDEX: 25.03 KG/M2 | OXYGEN SATURATION: 97 % | SYSTOLIC BLOOD PRESSURE: 116 MMHG | RESPIRATION RATE: 16 BRPM

## 2018-05-09 DIAGNOSIS — J45.20 MILD INTERMITTENT ASTHMA WITHOUT COMPLICATION: ICD-10-CM

## 2018-05-09 PROCEDURE — G0009 ADMIN PNEUMOCOCCAL VACCINE: HCPCS | Performed by: NURSE PRACTITIONER

## 2018-05-09 PROCEDURE — 90732 PPSV23 VACC 2 YRS+ SUBQ/IM: CPT | Performed by: NURSE PRACTITIONER

## 2018-05-09 PROCEDURE — 99213 OFFICE O/P EST LOW 20 MIN: CPT | Mod: 25 | Performed by: NURSE PRACTITIONER

## 2018-05-09 NOTE — PATIENT INSTRUCTIONS
1) Continue Symbicort 80/4.5 2 puffs, twice a day with mouth rinse  2) Continue exercise regimen  3) Vaccines: Up to date with Prevnar 13. Pneumovax 23 today.  4) Return in about 6 months (around 11/9/2018) for follow up with YOAV Fowler, if not sooner, review of symptoms.

## 2018-05-09 NOTE — PROGRESS NOTES
CC:  Here for f/u pulmonary issues as listed below    HPI:   Giovany presents today for follow up for COPD. PFTs from 2/2018 indicate a Fev1 of 2.51L or 94% predicted with a mild bronchodilator response, Fev1/FVC ratio of 72, +%, DLCO 106%. Hx of smoking, 20 pack years, quit in 1980. 6 min walk normal.  Chest x-ray from January 2018 noted hyperinflated lungs consistent with COPD.  Echocardiogram from January 2018 shows estimated ejection fraction to be 65%, RVSP 30mmHg.      He was started on Symbicort 80/4.5 2 puffs, twice a day with mouth rinse, which he has found beneficial and receives them from [facility].  He has not required any rescue inhaler.  He still reports shortness of breath after he goes up a flight of stairs but finds when he is exercising he is not out of breath moving from 2 different equipment.  He exercises to do 5 times a week.  Reviewed realistic expectations of Symbicort.        Patient Active Problem List    Diagnosis Date Noted   • Mild persistent asthma without complication 03/05/2018   • Hypothyroid 04/18/2014   • Inguinal hernia 09/05/2012   • HTN (hypertension) 05/29/2012   • Atrial fibrillation, amio 2011 12/02/2011   • Osteoarthritis, spinal fusion 2000 12/02/2011   • Aortic ectasia, thoracic (HCC) 12/02/2011       Past Medical History:   Diagnosis Date   • Arrhythmia    • ASTHMA    • Atrial fibrillation (HCC)    • Back pain    • Chickenpox    • Serbian measles    • Hypertension    • Peripheral vascular disease, unspecified (HCC)    • Stroke (HCC) 1980   • Tonsillitis    • Unspecified disorder of thyroid        Past Surgical History:   Procedure Laterality Date   • INGUINAL HERNIA REPAIR  9/5/2012    Performed by RIGOBERTO MCINTYRE at SURGERY [facility] ORS   • INGUINAL HERNIA REPAIR  11/3/2011    Performed by RIGOBERTO MCINTYRE at SURGERY [facility] ORS   • OTHER  2009    L3-4-5 susion   • OTHER  1980    C1-2-3 fusion   • TONSILLECTOMY  1952   • CARPAL TUNNEL RELEASE     • LAMINOTOMY       L 1,4,5 FUSION C1,2,3 FUSION       Family History   Problem Relation Age of Onset   • Cancer     • No Known Problems Mother    • No Known Problems Father    • Lung Disease Sister    • Cancer Brother        Social History   Substance Use Topics   • Smoking status: Former Smoker     Packs/day: 1.00     Years: 20.00     Types: Cigarettes     Quit date: 1/1/1980   • Smokeless tobacco: Never Used   • Alcohol use 4.2 - 16.8 oz/week     7 Standard drinks or equivalent per week      Comment: 7 DRINKS PER WEEK       Current Outpatient Prescriptions   Medication Sig Dispense Refill   • budesonide-formoterol (SYMBICORT) 80-4.5 MCG/ACT Aerosol Inhale 2 Puffs by mouth 2 Times a Day. Use spacer. Rinse mouth after each use. 1 Inhaler 0   • Omega-3 Fatty Acids (FISH OIL PO) Take  by mouth.     • Ferrous Sulfate (IRON) 325 (65 Fe) MG Tab Take  by mouth.     • amiodarone (CORDARONE) 200 MG Tab Take 1 Tab by mouth every day. 90 Tab 3   • levothyroxine (SYNTHROID) 88 MCG Tab Take 88 mcg by mouth Every morning on an empty stomach.     • Glucosamine-Chondroit-Vit C-Mn (GLUCOSAMINE 1500 COMPLEX) Cap Take  by mouth.     • losartan-hydrochlorothiazide (HYZAAR) 50-12.5 MG per tablet Take 1 Tab by mouth every day.     • Multiple Vitamin (MULTIVITAMIN PO) Take  by mouth every day.     • aspirin (ASA) 325 MG TABS Take 81 mg by mouth every day.     • meloxicam (MOBIC) 15 MG tablet Take 15 mg by mouth every day.     • Fluticasone Propionate, Inhal, (FLOVENT DISKUS) 100 MCG/BLIST AEPB Inhale  by mouth.       No current facility-administered medications for this visit.           Allergies: Tape          ROS   Gen: Denies fever, chills, unintentional weight loss, fatigue, night sweats  E/N/T: Denies nasal congestion, ear pain  Resp:Denies  wheezing, cough, sputum production, hemoptysis  CV: Denies chest pain, chest tightness, palpitations  Sleep:Denies morning headache  Neuro: Denies frequent headaches, weakness, dizziness  GI: Denies acid  "reflux, N/V  See HPI.  All other systems reviewed and negative          Vital signs for this encounter:  Vitals:    05/09/18 1001   Height: 1.88 m (6' 2\")   Weight: 88.5 kg (195 lb)   Weight % change since last entry.: 0 %   BP: 116/66   Pulse: (!) 57   BMI (Calculated): 25.04   Resp: 16   Temp: 36.6 °C (97.9 °F)   O2 sat % room air: 97 %                 Physical Exam:   Appearance: well developed, well nourished, no acute distress.   Eyes: PERRL, EOM intact, sclere white, conjunctiva moist.  Ears: no lesions or deformities.  Hearing: grossly intact.  Nose: no lesions or deformities.  Dentition: good dentition.   Oropharynx: tongue normal, posterior pharynx without erythema or exudate.  Neck: supple, trachea midline, no masses.  Respiratory effort: no intercostal retractions or use of accessory muscles.  Lung auscultation: Bilateral diminished   Heart auscultation: no murmur, rub, or gallop   Extremities: no cyanosis or edema.  Abdomen: soft, non-tender, no masses.  Gait and station: without difficulty   Digits and Nails: no clubbing, cyanosis, petechiae, or nodes.  Cranial nerves: grossly normal.  Motor: no focal deficits observed.   Skin: no rashes, lesions, or ulcers noted.  Orientation: oriented to time, place, and person.  Mood and affect: mood and affect appropriate, normal interaction with examiner.      Assessment   1. Mild intermittent asthma without complication  PNEUMOCOCCAL POLYSACCHARIDE VACCINE 23-VALENT =>3YO SQ/IM       Patient was seen for 20 minutes, more than 50% of time spent in face to face review, counseling, and arranging future evaluation and follow up of medical conditions and care.     PLAN:   Patient Instructions   1) Continue Symbicort 80/4.5 2 puffs, twice a day with mouth rinse  2) Continue exercise regimen  3) Vaccines: Up to date with Prevnar 13. Pneumovax 23 today.  4) Return in about 6 months (around 11/9/2018) for follow up with YOAV Fowler, if not sooner, review of " symptoms.

## 2018-06-25 ENCOUNTER — TELEPHONE (OUTPATIENT)
Dept: CARDIOLOGY | Facility: MEDICAL CENTER | Age: 73
End: 2018-06-25

## 2018-06-25 ENCOUNTER — OFFICE VISIT (OUTPATIENT)
Dept: CARDIOLOGY | Facility: MEDICAL CENTER | Age: 73
End: 2018-06-25
Payer: MEDICARE

## 2018-06-25 VITALS
DIASTOLIC BLOOD PRESSURE: 72 MMHG | OXYGEN SATURATION: 94 % | HEART RATE: 66 BPM | BODY MASS INDEX: 24.9 KG/M2 | WEIGHT: 194 LBS | SYSTOLIC BLOOD PRESSURE: 124 MMHG | HEIGHT: 74 IN

## 2018-06-25 DIAGNOSIS — I77.810 AORTIC ECTASIA, THORACIC (HCC): ICD-10-CM

## 2018-06-25 DIAGNOSIS — I10 ESSENTIAL HYPERTENSION: ICD-10-CM

## 2018-06-25 DIAGNOSIS — I48.19 PERSISTENT ATRIAL FIBRILLATION (HCC): ICD-10-CM

## 2018-06-25 PROCEDURE — 99214 OFFICE O/P EST MOD 30 MIN: CPT | Performed by: INTERNAL MEDICINE

## 2018-06-25 RX ORDER — DOXAZOSIN MESYLATE 1 MG/1
1 TABLET ORAL DAILY
COMMUNITY
End: 2018-12-18

## 2018-06-25 ASSESSMENT — ENCOUNTER SYMPTOMS
HEADACHES: 0
DIZZINESS: 0
NAUSEA: 0
MYALGIAS: 0
INSOMNIA: 0
SHORTNESS OF BREATH: 0
HEARTBURN: 0
LOSS OF CONSCIOUSNESS: 0
WEIGHT LOSS: 0
COUGH: 0
BRUISES/BLEEDS EASILY: 1
NERVOUS/ANXIOUS: 0

## 2018-06-25 NOTE — LETTER
[facility]   [address], [address]  [place] [address]  Phone: [phone]  Fax: [phone]              Giovany Kruger  1945    Encounter Date: 6/25/2018    Hyun Mendiola M.D.          PROGRESS NOTE:  Subjective:   Giovany Kruger is a 73y.o. male who presents today in follow-up in regards to his aortic ectasia in the setting of persistent atrial fibrillation and hypertension    Doing very well, still waiting to move to [place]  All medicines as directed, frustrated he could not get into see us sooner    No setbacks, still walking a lot and hiking    Past Medical History:   Diagnosis Date   • Arrhythmia    • ASTHMA    • Atrial fibrillation (HCC)    • Back pain    • Chickenpox    • Nepalese measles    • Hypertension    • Peripheral vascular disease, unspecified (HCC)    • Stroke (HCC) 1980   • Tonsillitis    • Unspecified disorder of thyroid      Past Surgical History:   Procedure Laterality Date   • INGUINAL HERNIA REPAIR  9/5/2012    Performed by RIGOBERTO MCINTYRE at SURGERY [facility] ORS   • INGUINAL HERNIA REPAIR  11/3/2011    Performed by RIGOBERTO MCINTYRE at SURGERY [facility] ORS   • OTHER  2009    L3-4-5 susion   • OTHER  1980    C1-2-3 fusion   • TONSILLECTOMY  1952   • CARPAL TUNNEL RELEASE     • LAMINOTOMY      L 1,4,5 FUSION C1,2,3 FUSION     Family History   Problem Relation Age of Onset   • Cancer     • No Known Problems Mother    • No Known Problems Father    • Lung Disease Sister    • Cancer Brother      History   Smoking Status   • Former Smoker   • Packs/day: 1.00   • Years: 20.00   • Types: Cigarettes   • Quit date: 1/1/1980   Smokeless Tobacco   • Never Used     Allergies   Allergen Reactions   • Tape      Blisters and scarring     Outpatient Encounter Prescriptions as of 6/25/2018   Medication Sig Dispense Refill   • doxazosin (CARDURA) 1 MG Tab Take 1 mg by mouth every day.     • budesonide-formoterol (SYMBICORT) 80-4.5  "MCG/ACT Aerosol Inhale 2 Puffs by mouth 2 Times a Day. Use spacer. Rinse mouth after each use. 1 Inhaler 0   • Omega-3 Fatty Acids (FISH OIL PO) Take  by mouth.     • Ferrous Sulfate (IRON) 325 (65 Fe) MG Tab Take  by mouth.     • amiodarone (CORDARONE) 200 MG Tab Take 1 Tab by mouth every day. 90 Tab 3   • levothyroxine (SYNTHROID) 88 MCG Tab Take 88 mcg by mouth Every morning on an empty stomach.     • Glucosamine-Chondroit-Vit C-Mn (GLUCOSAMINE 1500 COMPLEX) Cap Take  by mouth.     • losartan-hydrochlorothiazide (HYZAAR) 50-12.5 MG per tablet Take 1 Tab by mouth every day.     • Multiple Vitamin (MULTIVITAMIN PO) Take  by mouth every day.     • aspirin (ASA) 325 MG TABS Take 81 mg by mouth every day.     • [DISCONTINUED] meloxicam (MOBIC) 15 MG tablet Take 15 mg by mouth every day.     • [DISCONTINUED] Fluticasone Propionate, Inhal, (FLOVENT DISKUS) 100 MCG/BLIST AEPB Inhale  by mouth.       No facility-administered encounter medications on file as of 6/25/2018.      Review of Systems   Constitutional: Negative for malaise/fatigue and weight loss.   HENT: Negative for congestion.    Respiratory: Negative for cough and shortness of breath.    Cardiovascular: Negative for chest pain and leg swelling.   Gastrointestinal: Negative for heartburn and nausea.   Musculoskeletal: Negative for joint pain and myalgias (taking glucosamine which helps).   Neurological: Negative for dizziness, loss of consciousness and headaches.   Endo/Heme/Allergies: Bruises/bleeds easily (No change on the baby aspirin).   Psychiatric/Behavioral: The patient is not nervous/anxious and does not have insomnia.    All other systems reviewed and are negative.       Objective:   /72   Pulse 66   Ht 1.88 m (6' 2\")   Wt 88 kg (194 lb)   SpO2 94%   BMI 24.91 kg/m²      Physical Exam   Constitutional: He is oriented to person, place, and time. He appears well-developed and well-nourished.   Chronic kyphoscoliosis   HENT:   Head: " Normocephalic and atraumatic.   Eyes: EOM are normal. Pupils are equal, round, and reactive to light.   Neck: No JVD present. No tracheal deviation present.   Cardiovascular: Normal rate, regular rhythm and intact distal pulses.    No murmur heard.  Pulmonary/Chest: Effort normal and breath sounds normal. No respiratory distress. He exhibits no tenderness.   Abdominal: Bowel sounds are normal. He exhibits no distension.   Musculoskeletal: Normal range of motion. He exhibits no edema.   Lymphadenopathy:     He has no cervical adenopathy.   Neurological: He is alert and oriented to person, place, and time.   Skin: Skin is warm and dry. No rash noted.   Psychiatric: He has a normal mood and affect. His behavior is normal.       Assessment:     1. Aortic ectasia, thoracic (HCC)     2. Persistent atrial fibrillation (HCC)     3. Essential hypertension         Medical Decision Making:  Today's Assessment / Status / Plan:     Aortic aneurysm, we reviewed his recent 2018 echocardiogram together and the aortic root and ascending aorta are well seen, no change at all. We also reviewed the images from his CAT scan from 2012. He has had no interval change in the imaging quality is quite good. We talked about strict blood pressure control and diet again.  Echo 2019    Hyperlipidemia, in the setting of aortic ectasia.  Labs reviewed from January, his lipid panel has been excellent and his LDL was then less than 70. He has not tolerated any statins and has been maintained on fish oil and aspirin. He has not had any severe bleeding or bruising.  Continue baby aspirin, discussed bruising  His wife likes him to take supplements and we discussed this as well    Hypertension  As above, good control, diet and exercise addressed again  Atrial fibrillation, in remission on antiarrhythmic therapy.   Normal and reassuring TSH, LFTs and chest x-ray reviewed from this year.   He gets his eyes checked annually    RTC 6 months per patient  preference    Physical Exam   Constitutional: He is oriented to person, place, and time. He appears well-developed and well-nourished.   Chronic kyphoscoliosis   HENT:   Head: Normocephalic and atraumatic.   Eyes: EOM are normal. Pupils are equal, round, and reactive to light.   Neck: No JVD present. No tracheal deviation present.   Cardiovascular: Normal rate, regular rhythm and intact distal pulses.    No murmur heard.  Pulmonary/Chest: Effort normal and breath sounds normal. No respiratory distress. He exhibits no tenderness.   Abdominal: Bowel sounds are normal. He exhibits no distension.   Musculoskeletal: Normal range of motion. He exhibits no edema.   Lymphadenopathy:     He has no cervical adenopathy.   Neurological: He is alert and oriented to person, place, and time.   Skin: Skin is warm and dry. No rash noted.   Psychiatric: He has a normal mood and affect. His behavior is normal.           Gerald RAGSDALE M.D.  [id number]  [place] [address]  VIA Facsimile: [phone]

## 2018-06-25 NOTE — PROGRESS NOTES
Subjective:   Giovany Kruger is a 73y.o. male who presents today in follow-up in regards to his aortic ectasia in the setting of persistent atrial fibrillation and hypertension    Doing very well, still waiting to move to [place]  All medicines as directed, frustrated he could not get into see us sooner    No setbacks, still walking a lot and hiking    Past Medical History:   Diagnosis Date   • Arrhythmia    • ASTHMA    • Atrial fibrillation (HCC)    • Back pain    • Chickenpox    • Turkish measles    • Hypertension    • Peripheral vascular disease, unspecified (HCC)    • Stroke (HCC) 1980   • Tonsillitis    • Unspecified disorder of thyroid      Past Surgical History:   Procedure Laterality Date   • INGUINAL HERNIA REPAIR  9/5/2012    Performed by RIGOBERTO MCINTYRE at SURGERY [facility] ORS   • INGUINAL HERNIA REPAIR  11/3/2011    Performed by RIGOBERTO MCINTYRE at SURGERY [facility] ORS   • OTHER  2009    L3-4-5 susion   • OTHER  1980    C1-2-3 fusion   • TONSILLECTOMY  1952   • CARPAL TUNNEL RELEASE     • LAMINOTOMY      L 1,4,5 FUSION C1,2,3 FUSION     Family History   Problem Relation Age of Onset   • Cancer     • No Known Problems Mother    • No Known Problems Father    • Lung Disease Sister    • Cancer Brother      History   Smoking Status   • Former Smoker   • Packs/day: 1.00   • Years: 20.00   • Types: Cigarettes   • Quit date: 1/1/1980   Smokeless Tobacco   • Never Used     Allergies   Allergen Reactions   • Tape      Blisters and scarring     Outpatient Encounter Prescriptions as of 6/25/2018   Medication Sig Dispense Refill   • doxazosin (CARDURA) 1 MG Tab Take 1 mg by mouth every day.     • budesonide-formoterol (SYMBICORT) 80-4.5 MCG/ACT Aerosol Inhale 2 Puffs by mouth 2 Times a Day. Use spacer. Rinse mouth after each use. 1 Inhaler 0   • Omega-3 Fatty Acids (FISH OIL PO) Take  by mouth.     • Ferrous Sulfate (IRON) 325 (65 Fe) MG Tab Take  by mouth.     • amiodarone (CORDARONE) 200 MG Tab Take 1  "Tab by mouth every day. 90 Tab 3   • levothyroxine (SYNTHROID) 88 MCG Tab Take 88 mcg by mouth Every morning on an empty stomach.     • Glucosamine-Chondroit-Vit C-Mn (GLUCOSAMINE 1500 COMPLEX) Cap Take  by mouth.     • losartan-hydrochlorothiazide (HYZAAR) 50-12.5 MG per tablet Take 1 Tab by mouth every day.     • Multiple Vitamin (MULTIVITAMIN PO) Take  by mouth every day.     • aspirin (ASA) 325 MG TABS Take 81 mg by mouth every day.     • [DISCONTINUED] meloxicam (MOBIC) 15 MG tablet Take 15 mg by mouth every day.     • [DISCONTINUED] Fluticasone Propionate, Inhal, (FLOVENT DISKUS) 100 MCG/BLIST AEPB Inhale  by mouth.       No facility-administered encounter medications on file as of 6/25/2018.      Review of Systems   Constitutional: Negative for malaise/fatigue and weight loss.   HENT: Negative for congestion.    Respiratory: Negative for cough and shortness of breath.    Cardiovascular: Negative for chest pain and leg swelling.   Gastrointestinal: Negative for heartburn and nausea.   Musculoskeletal: Negative for joint pain and myalgias (taking glucosamine which helps).   Neurological: Negative for dizziness, loss of consciousness and headaches.   Endo/Heme/Allergies: Bruises/bleeds easily (No change on the baby aspirin).   Psychiatric/Behavioral: The patient is not nervous/anxious and does not have insomnia.    All other systems reviewed and are negative.       Objective:   /72   Pulse 66   Ht 1.88 m (6' 2\")   Wt 88 kg (194 lb)   SpO2 94%   BMI 24.91 kg/m²     Physical Exam   Constitutional: He is oriented to person, place, and time. He appears well-developed and well-nourished.   Chronic kyphoscoliosis   HENT:   Head: Normocephalic and atraumatic.   Eyes: EOM are normal. Pupils are equal, round, and reactive to light.   Neck: No JVD present. No tracheal deviation present.   Cardiovascular: Normal rate, regular rhythm and intact distal pulses.    No murmur heard.  Pulmonary/Chest: Effort normal " and breath sounds normal. No respiratory distress. He exhibits no tenderness.   Abdominal: Bowel sounds are normal. He exhibits no distension.   Musculoskeletal: Normal range of motion. He exhibits no edema.   Lymphadenopathy:     He has no cervical adenopathy.   Neurological: He is alert and oriented to person, place, and time.   Skin: Skin is warm and dry. No rash noted.   Psychiatric: He has a normal mood and affect. His behavior is normal.       Assessment:     1. Aortic ectasia, thoracic (HCC)     2. Persistent atrial fibrillation (HCC)     3. Essential hypertension         Medical Decision Making:  Today's Assessment / Status / Plan:     Aortic aneurysm, we reviewed his recent 2018 echocardiogram together and the aortic root and ascending aorta are well seen, no change at all. We also reviewed the images from his CAT scan from 2012. He has had no interval change in the imaging quality is quite good. We talked about strict blood pressure control and diet again.  Echo 2019    Hyperlipidemia, in the setting of aortic ectasia.  Labs reviewed from January, his lipid panel has been excellent and his LDL was then less than 70. He has not tolerated any statins and has been maintained on fish oil and aspirin. He has not had any severe bleeding or bruising.  Continue baby aspirin, discussed bruising  His wife likes him to take supplements and we discussed this as well    Hypertension  As above, good control, diet and exercise addressed again  Atrial fibrillation, in remission on antiarrhythmic therapy.   Normal and reassuring TSH, LFTs and chest x-ray reviewed from this year.   He gets his eyes checked annually    RTC 6 months per patient preference    Physical Exam   Constitutional: He is oriented to person, place, and time. He appears well-developed and well-nourished.   Chronic kyphoscoliosis   HENT:   Head: Normocephalic and atraumatic.   Eyes: EOM are normal. Pupils are equal, round, and reactive to light.   Neck:  No JVD present. No tracheal deviation present.   Cardiovascular: Normal rate, regular rhythm and intact distal pulses.    No murmur heard.  Pulmonary/Chest: Effort normal and breath sounds normal. No respiratory distress. He exhibits no tenderness.   Abdominal: Bowel sounds are normal. He exhibits no distension.   Musculoskeletal: Normal range of motion. He exhibits no edema.   Lymphadenopathy:     He has no cervical adenopathy.   Neurological: He is alert and oriented to person, place, and time.   Skin: Skin is warm and dry. No rash noted.   Psychiatric: He has a normal mood and affect. His behavior is normal.

## 2018-11-19 ENCOUNTER — OFFICE VISIT (OUTPATIENT)
Dept: PULMONOLOGY | Facility: HOSPICE | Age: 73
End: 2018-11-19
Payer: MEDICARE

## 2018-11-19 VITALS
SYSTOLIC BLOOD PRESSURE: 122 MMHG | DIASTOLIC BLOOD PRESSURE: 80 MMHG | BODY MASS INDEX: 24.51 KG/M2 | RESPIRATION RATE: 16 BRPM | WEIGHT: 191 LBS | HEART RATE: 70 BPM | TEMPERATURE: 98 F | HEIGHT: 74 IN | OXYGEN SATURATION: 96 %

## 2018-11-19 DIAGNOSIS — J45.30 MILD PERSISTENT ASTHMA WITHOUT COMPLICATION: ICD-10-CM

## 2018-11-19 PROCEDURE — 99214 OFFICE O/P EST MOD 30 MIN: CPT | Performed by: INTERNAL MEDICINE

## 2018-11-19 RX ORDER — AMIODARONE HYDROCHLORIDE 200 MG/1
TABLET ORAL
COMMUNITY
End: 2018-12-18

## 2018-11-19 RX ORDER — MELOXICAM 15 MG/1
TABLET ORAL
COMMUNITY
End: 2018-12-18

## 2018-11-19 RX ORDER — LEVOTHYROXINE SODIUM 88 UG/1
TABLET ORAL
COMMUNITY
End: 2018-12-18

## 2018-11-19 RX ORDER — METHYLPREDNISOLONE 4 MG/1
TABLET ORAL
COMMUNITY
End: 2018-12-18

## 2018-11-19 RX ORDER — LOSARTAN POTASSIUM AND HYDROCHLOROTHIAZIDE 12.5; 5 MG/1; MG/1
TABLET ORAL
COMMUNITY
End: 2018-12-18

## 2018-11-19 ASSESSMENT — PAIN SCALES - GENERAL: PAINLEVEL: NO PAIN

## 2018-11-20 NOTE — PROGRESS NOTES
CC:  Chief Complaint   Patient presents with   • Follow-Up     Shortness of breath     Patient came today for follow-up.  He has history of asthma with baseline dyspnea on exertion    HPI:   The patient is a 73 y.o. male with history of asthma came today for follow-up.  The patient is using generic form of Symbicort from [facility] 100 mcg/6.  He thinks symptoms are much better since he started  this medicine, they are not completely back to normal he gets some dyspnea on exertion.  The patient was not able to afford Symbicort.        ROS:   Constitutional: Denies fevers, chills, night sweats  Eyes: Denies vision loss, pain, drainage, double vision  Ears, Nose, Throat: Denies earache, difficulty hearing, tinnitus, nasal congestion, hoarseness  Cardiovascular: Denies chest pain, tightness, palpitations, orthopnea or edema  Respiratory: Please see HPI   GI: Denies heartburn, dysphagia, nausea, abdominal pain, diarrhea or constipation  : Denies frequent urination, hematuria, discharge or painful urination  Musculoskeletal: Denies back pain, painful joints, sore muscles  Neurological: Denies weakness or headaches  Skin: No rashes  All other ROS were negative except what mentioned in the HPI     Past Medical History:  Past Medical History:   Diagnosis Date   • Arrhythmia    • ASTHMA    • Atrial fibrillation (HCC)    • Back pain    • Chickenpox    • Arabic measles    • Hypertension    • Peripheral vascular disease, unspecified (HCC)    • Stroke (HCC) 1980   • Tonsillitis    • Unspecified disorder of thyroid                Social History:  Social History     Social History   • Marital status:      Spouse name: N/A   • Number of children: N/A   • Years of education: N/A     Occupational History   • Not on file.     Social History Main Topics   • Smoking status: Former Smoker     Packs/day: 1.00     Years: 20.00     Types: Cigarettes     Quit date: 1/1/1980   • Smokeless tobacco: Never Used   • Alcohol use 4.2 - 16.8  "oz/week     7 Standard drinks or equivalent per week      Comment: 7 DRINKS PER WEEK   • Drug use: No   • Sexual activity: Not on file     Other Topics Concern   • Not on file     Social History Narrative   • No narrative on file           Family History:  Family History   Problem Relation Age of Onset   • Cancer Unknown    • No Known Problems Mother    • No Known Problems Father    • Lung Disease Sister    • Cancer Brother        Current Outpatient Prescriptions on File Prior to Visit   Medication Sig Dispense Refill   • budesonide-formoterol (SYMBICORT) 80-4.5 MCG/ACT Aerosol Inhale 2 Puffs by mouth 2 Times a Day. Use spacer. Rinse mouth after each use. 1 Inhaler 0   • Omega-3 Fatty Acids (FISH OIL PO) Take  by mouth.     • Ferrous Sulfate (IRON) 325 (65 Fe) MG Tab Take  by mouth.     • amiodarone (CORDARONE) 200 MG Tab Take 1 Tab by mouth every day. 90 Tab 3   • Glucosamine-Chondroit-Vit C-Mn (GLUCOSAMINE 1500 COMPLEX) Cap Take  by mouth.     • losartan-hydrochlorothiazide (HYZAAR) 50-12.5 MG per tablet Take 1 Tab by mouth every day.     • Multiple Vitamin (MULTIVITAMIN PO) Take  by mouth every day.     • aspirin (ASA) 325 MG TABS Take 81 mg by mouth every day.     • doxazosin (CARDURA) 1 MG Tab Take 1 mg by mouth every day.     • levothyroxine (SYNTHROID) 88 MCG Tab Take 88 mcg by mouth Every morning on an empty stomach.       No current facility-administered medications on file prior to visit.        Allergies:   Tape        Vitals:    11/19/18 1538   Height: 1.88 m (6' 2\")   Weight: 86.6 kg (191 lb)   Weight % change since last entry.: 0 %   BP: 122/80   Pulse: 70   BMI (Calculated): 24.52   Resp: 16   Temp: 36.7 °C (98 °F)   TempSrc: Oral           Physical Exam:  Appearance:  in no acute distress  HEENT: Normocephalic, atraumatic, white sclera, PERRLA, oropharynx clear  Neck: No adenopathy or masses  Respiratory: no intercostal retractions or accessory muscle use  Lungs auscultation: Clear to auscultation " bilaterally  Cardiovascular: Regular rate rhythm. No murmurs, rubs or gallops.  No LE edema  Abdomen: soft, nondistended  Gait: Normal  Digits: No clubbing, cyanosis  Motor: No focal deficits  Orientation: Oriented to time, person and place      DATA:    Labs:  Lab Results   Component Value Date/Time    WBC 6.2 01/11/2018 11:54 AM    RBC 4.30 (L) 01/11/2018 11:54 AM    HEMOGLOBIN 13.8 (L) 01/11/2018 11:54 AM    HEMATOCRIT 41.4 (L) 01/11/2018 11:54 AM    MCV 96.3 01/11/2018 11:54 AM    MCH 32.1 01/11/2018 11:54 AM    MCHC 33.3 (L) 01/11/2018 11:54 AM    MPV 10.5 01/11/2018 11:54 AM    NEUTSPOLYS 70.70 01/11/2018 11:54 AM    LYMPHOCYTES 18.50 (L) 01/11/2018 11:54 AM    MONOCYTES 6.50 01/11/2018 11:54 AM    EOSINOPHILS 3.50 01/11/2018 11:54 AM    BASOPHILS 0.50 01/11/2018 11:54 AM    ANISOCYTOSIS 1+ 04/22/2017 07:47 AM      Lab Results   Component Value Date/Time    SODIUM 137 04/19/2018 02:47 PM    POTASSIUM 3.7 04/19/2018 02:47 PM    CHLORIDE 106 04/19/2018 02:47 PM    CO2 25 04/19/2018 02:47 PM    GLUCOSE 89 04/19/2018 02:47 PM    BUN 23 (H) 04/19/2018 02:47 PM    CREATININE 1.53 (H) 04/19/2018 02:47 PM    BUNCREATRAT 16 12/23/2016 08:24 AM                Diagnosis:  1. Mild persistent asthma without complication  Fluticasone Furoate-Vilanterol (BREO ELLIPTA) 100-25 MCG/INH AEROSOL POWDER, BREATH ACTIVATED        Assessment and Plan     The patient has mild persistent asthma, he improved significantly on Symbicort unfortunately has to buy generic form from [facility] with a little bit different dose (but is a night of 100 mcg and formoterol of 6)  I will give the patient a sample of Breo 100/25 to try instead of Symbicort.  If you continue to improve on Breo we will switch his medications to Breo.    The patient already had his flu vaccine.                    Return in about 6 months (around 5/19/2019) for Follow up visit with APRN.        This note was created using voice recognition software. I apologize for any  overlooked obvious grammar or  vocabulary mistake

## 2018-12-18 ENCOUNTER — OFFICE VISIT (OUTPATIENT)
Dept: CARDIOLOGY | Facility: MEDICAL CENTER | Age: 73
End: 2018-12-18
Payer: MEDICARE

## 2018-12-18 VITALS
HEIGHT: 74 IN | HEART RATE: 56 BPM | SYSTOLIC BLOOD PRESSURE: 120 MMHG | DIASTOLIC BLOOD PRESSURE: 80 MMHG | OXYGEN SATURATION: 97 % | BODY MASS INDEX: 24.9 KG/M2 | WEIGHT: 194 LBS

## 2018-12-18 DIAGNOSIS — I10 ESSENTIAL HYPERTENSION: ICD-10-CM

## 2018-12-18 DIAGNOSIS — I77.810 AORTIC ECTASIA, THORACIC (HCC): ICD-10-CM

## 2018-12-18 DIAGNOSIS — I48.19 PERSISTENT ATRIAL FIBRILLATION (HCC): ICD-10-CM

## 2018-12-18 PROCEDURE — 99214 OFFICE O/P EST MOD 30 MIN: CPT | Performed by: INTERNAL MEDICINE

## 2018-12-18 ASSESSMENT — ENCOUNTER SYMPTOMS
WEIGHT LOSS: 0
PND: 0
SHORTNESS OF BREATH: 0
DEPRESSION: 0
HEARTBURN: 0
MYALGIAS: 0
BRUISES/BLEEDS EASILY: 0
PALPITATIONS: 0
NAUSEA: 0
INSOMNIA: 0
FALLS: 0
COUGH: 0
NERVOUS/ANXIOUS: 0
DOUBLE VISION: 0
BLURRED VISION: 0
LOSS OF CONSCIOUSNESS: 0
VOMITING: 0
DIZZINESS: 0

## 2018-12-18 NOTE — PROGRESS NOTES
Chief Complaint   Patient presents with   • HTN (Controlled)     follow up       Subjective:   Giovany Kruger is a 73 y.o. male who presents today in follow-up in regards to his persistent atrial fibrillation on antiarrhythmic therapy as well as hypertension and aortic ectasia    Doing very well not moving.  Still has his house on the market.  Tolerating his medications really appreciates the help of Dr. Helton    Really enjoys going to the theater, likes comedy and [place]  No setbacks palpitations or falling.  Doing very well wife is trouble with blood pressure.  His dog is getting very old    Past Medical History:   Diagnosis Date   • Arrhythmia    • ASTHMA    • Atrial fibrillation (HCC)    • Back pain    • Chickenpox    • Icelandic measles    • Hypertension    • Peripheral vascular disease, unspecified (HCC)    • Stroke (HCC) 1980   • Tonsillitis    • Unspecified disorder of thyroid      Past Surgical History:   Procedure Laterality Date   • INGUINAL HERNIA REPAIR  9/5/2012    Performed by RIGOBERTO MCINYTRE at SURGERY [facility] ORS   • INGUINAL HERNIA REPAIR  11/3/2011    Performed by RIGOBERTO MCINTYRE at SURGERY [facility] ORS   • OTHER  2009    L3-4-5 susion   • OTHER  1980    C1-2-3 fusion   • TONSILLECTOMY  1952   • CARPAL TUNNEL RELEASE     • LAMINOTOMY      L 1,4,5 FUSION C1,2,3 FUSION     Family History   Problem Relation Age of Onset   • Cancer Unknown    • No Known Problems Mother    • No Known Problems Father    • Lung Disease Sister    • Cancer Brother      Social History     Social History   • Marital status:      Spouse name: N/A   • Number of children: N/A   • Years of education: N/A     Occupational History   • Not on file.     Social History Main Topics   • Smoking status: Former Smoker     Packs/day: 1.00     Years: 20.00     Types: Cigarettes     Quit date: 1/1/1980   • Smokeless tobacco: Never Used   • Alcohol use 4.2 - 16.8 oz/week     7 Standard drinks or equivalent per week       Comment: 7 DRINKS PER WEEK   • Drug use: No   • Sexual activity: Not on file     Other Topics Concern   • Not on file     Social History Narrative   • No narrative on file     Allergies   Allergen Reactions   • Tape      Blisters and scarring     Outpatient Encounter Prescriptions as of 12/18/2018   Medication Sig Dispense Refill   • aspirin 81 MG tablet Take 81 mg by mouth every day.     • budesonide-formoterol (SYMBICORT) 80-4.5 MCG/ACT Aerosol Inhale 2 Puffs by mouth 2 Times a Day. Use spacer. Rinse mouth after each use. 1 Inhaler 0   • Omega-3 Fatty Acids (FISH OIL PO) Take  by mouth.     • Ferrous Sulfate (IRON) 325 (65 Fe) MG Tab Take  by mouth.     • amiodarone (CORDARONE) 200 MG Tab Take 1 Tab by mouth every day. 90 Tab 3   • levothyroxine (SYNTHROID) 88 MCG Tab Take 88 mcg by mouth Every morning on an empty stomach.     • Glucosamine-Chondroit-Vit C-Mn (GLUCOSAMINE 1500 COMPLEX) Cap Take  by mouth.     • losartan-hydrochlorothiazide (HYZAAR) 50-12.5 MG per tablet Take 1 Tab by mouth every day.     • Multiple Vitamin (MULTIVITAMIN PO) Take  by mouth every day.     • [DISCONTINUED] meloxicam (MOBIC) 15 MG tablet meloxicam 15 mg tablet     • [DISCONTINUED] MethylPREDNISolone (MEDROL DOSEPAK) 4 MG Tablet Therapy Pack methylprednisolone 4 mg tablets in a dose pack     • [DISCONTINUED] amiodarone (CORDARONE) 200 MG Tab amiodarone 200 mg tablet     • [DISCONTINUED] levothyroxine (SYNTHROID) 88 MCG Tab levothyroxine 88 mcg tablet     • [DISCONTINUED] losartan-hydrochlorothiazide (HYZAAR) 50-12.5 MG per tablet losartan 50 mg-hydrochlorothiazide 12.5 mg tablet     • [DISCONTINUED] CREAM BASE EX [id number] cream (n)   APPLY 1-2 GRAMS TO AFFECTED AREA 3-4 TIMES DAILY, RUB IN THOROUGHLY     • [DISCONTINUED] Fluticasone Furoate-Vilanterol (BREO ELLIPTA) 100-25 MCG/INH AEROSOL POWDER, BREATH ACTIVATED Inhale 1 Puff by mouth every day. Rinse mouth after use. (Patient not taking: Reported on 12/18/2018) 2 Each 0   •  "[DISCONTINUED] doxazosin (CARDURA) 1 MG Tab Take 1 mg by mouth every day.     • [DISCONTINUED] aspirin (ASA) 325 MG TABS Take 81 mg by mouth every day.       No facility-administered encounter medications on file as of 12/18/2018.      Review of Systems   Constitutional: Negative for malaise/fatigue and weight loss.   HENT: Negative for hearing loss and tinnitus.    Eyes: Negative for blurred vision and double vision.   Respiratory: Negative for cough and shortness of breath.    Cardiovascular: Negative for chest pain, palpitations, leg swelling and PND.   Gastrointestinal: Negative for heartburn, nausea and vomiting.   Musculoskeletal: Negative for falls, joint pain and myalgias.   Skin: Negative for itching and rash.   Neurological: Negative for dizziness and loss of consciousness.   Endo/Heme/Allergies: Negative for environmental allergies. Does not bruise/bleed easily.   Psychiatric/Behavioral: Negative for depression. The patient is not nervous/anxious and does not have insomnia.    All other systems reviewed and are negative.       Objective:   /80 (BP Location: Left arm, Patient Position: Sitting)   Pulse (!) 56   Ht 1.88 m (6' 2\")   Wt 88 kg (194 lb)   SpO2 97%   BMI 24.91 kg/m²     Physical Exam   Constitutional: He is oriented to person, place, and time. He appears well-developed and well-nourished.   Patient seen and examined again today changes noted.  Chronic kyphoscoliosis   HENT:   Head: Normocephalic and atraumatic.   Eyes: Pupils are equal, round, and reactive to light. EOM are normal. No scleral icterus.   Neck: No JVD present. No tracheal deviation present.   Cardiovascular: Normal rate, regular rhythm and intact distal pulses.    No murmur heard.  Pulmonary/Chest: Effort normal and breath sounds normal. No respiratory distress.   Abdominal: Bowel sounds are normal. He exhibits no distension.   Musculoskeletal: Normal range of motion. He exhibits no edema or tenderness.   Neurological: " He is alert and oriented to person, place, and time.   Skin: Skin is warm and dry. No rash noted.   Psychiatric: He has a normal mood and affect. His behavior is normal.       Assessment:     1. Persistent atrial fibrillation (HCC)     2. Aortic ectasia, thoracic (HCC)     3. Essential hypertension         Medical Decision Making:  Today's Assessment / Status / Plan:       Atrial fibrillation  Quiescent on antiarrhythmic therapy  TSH LFTs chest x-ray done every year  Twelve-lead EKG will be done in 2019 when I see him back  Aspirin  Talked about screening with Holter monitor which I can order with an echo in 2019    Anemia  Has not been kept on anticoagulation due to this  Labs reviewed look much better  Tolerating iron following with PCP    Aortic ectasia  History of intolerance of statins  On baby aspirin  Echo 2019 CAT scan 2012 compared with this    Hypertension  Good control with weight loss  Follows with PCP  RTC 6 months sooner with concerns

## 2018-12-28 DIAGNOSIS — J45.20 MILD INTERMITTENT ASTHMA WITHOUT COMPLICATION: ICD-10-CM

## 2018-12-28 RX ORDER — BUDESONIDE AND FORMOTEROL FUMARATE DIHYDRATE 80; 4.5 UG/1; UG/1
2 AEROSOL RESPIRATORY (INHALATION) 2 TIMES DAILY
Qty: 3 INHALER | Refills: 3 | Status: SHIPPED
Start: 2018-12-28 | End: 2019-01-04 | Stop reason: SDUPTHER

## 2018-12-28 NOTE — TELEPHONE ENCOUNTER
Have we ever prescribed this med? Yes.  If yes, what date? 03/27/18    Last OV: 11/19/18    Next OV: none scheduled     DX: Asthma    Medications: SYMBICORT    Needs to be sent to the [facility] in [place]  Fax [phone] or [phone]

## 2019-01-04 RX ORDER — BUDESONIDE AND FORMOTEROL FUMARATE DIHYDRATE 80; 4.5 UG/1; UG/1
2 AEROSOL RESPIRATORY (INHALATION) 2 TIMES DAILY
Qty: 3 INHALER | Refills: 3 | Status: SHIPPED
Start: 2019-01-04 | End: 2019-06-14

## 2019-01-04 NOTE — TELEPHONE ENCOUNTER
Please resign script for Symbicort. Mail order Pharmacy never received script. Please print out and fax to   F# [phone] or [phone]

## 2019-02-18 ENCOUNTER — OFFICE VISIT (OUTPATIENT)
Dept: URGENT CARE | Facility: CLINIC | Age: 74
End: 2019-02-18
Payer: MEDICARE

## 2019-02-18 ENCOUNTER — APPOINTMENT (OUTPATIENT)
Dept: RADIOLOGY | Facility: IMAGING CENTER | Age: 74
End: 2019-02-18
Attending: NURSE PRACTITIONER
Payer: MEDICARE

## 2019-02-18 VITALS
SYSTOLIC BLOOD PRESSURE: 100 MMHG | OXYGEN SATURATION: 97 % | HEART RATE: 58 BPM | DIASTOLIC BLOOD PRESSURE: 70 MMHG | RESPIRATION RATE: 16 BRPM | BODY MASS INDEX: 24.64 KG/M2 | TEMPERATURE: 97.3 F | WEIGHT: 192 LBS | HEIGHT: 74 IN

## 2019-02-18 DIAGNOSIS — R05.9 COUGH: ICD-10-CM

## 2019-02-18 DIAGNOSIS — J22 ACUTE LOWER RESPIRATORY TRACT INFECTION: ICD-10-CM

## 2019-02-18 PROCEDURE — 99203 OFFICE O/P NEW LOW 30 MIN: CPT | Performed by: NURSE PRACTITIONER

## 2019-02-18 PROCEDURE — 71046 X-RAY EXAM CHEST 2 VIEWS: CPT | Mod: TC | Performed by: NURSE PRACTITIONER

## 2019-02-18 RX ORDER — BENZONATATE 100 MG/1
100 CAPSULE ORAL 2 TIMES DAILY PRN
Qty: 30 CAP | Refills: 0 | Status: SHIPPED | OUTPATIENT
Start: 2019-02-18 | End: 2019-02-18 | Stop reason: SDUPTHER

## 2019-02-18 RX ORDER — BENZONATATE 100 MG/1
100 CAPSULE ORAL 2 TIMES DAILY PRN
Qty: 30 CAP | Refills: 0 | Status: SHIPPED | OUTPATIENT
Start: 2019-02-18 | End: 2019-06-14

## 2019-02-18 RX ORDER — CODEINE PHOSPHATE AND GUAIFENESIN 10; 100 MG/5ML; MG/5ML
5 SOLUTION ORAL
Qty: 120 ML | Refills: 0 | Status: SHIPPED | OUTPATIENT
Start: 2019-02-18 | End: 2019-03-10

## 2019-02-18 RX ORDER — DOXYCYCLINE HYCLATE 100 MG
100 TABLET ORAL 2 TIMES DAILY
Qty: 20 TAB | Refills: 0 | Status: SHIPPED | OUTPATIENT
Start: 2019-02-18 | End: 2019-02-18 | Stop reason: SDUPTHER

## 2019-02-18 RX ORDER — DOXYCYCLINE HYCLATE 100 MG
100 TABLET ORAL 2 TIMES DAILY
Qty: 20 TAB | Refills: 0 | Status: SHIPPED | OUTPATIENT
Start: 2019-02-18 | End: 2019-02-28

## 2019-02-18 NOTE — PROGRESS NOTES
Chief Complaint   Patient presents with   • URI           HISTORY OF PRESENT ILLNESS: Patient is a 73 y.o. male who presents today due to symptoms that started two months ago. Since then he has had a persistent cough. The cough is dry. Reports intermittent associated nasal congestion, sinus pressure, mild sore throat, bilateral ear pressure, fever, chills, fatigue, malaise, and body aches. Denies chest pain, shortness of breath, or wheezing. Admits to h/o asthma. Has tried OTC cold medications without significant relief of symptoms. No recent ABX use. No other aggravating or alleviating factors.     Patient Active Problem List    Diagnosis Date Noted   • Mild persistent asthma without complication 03/05/2018   • Hypothyroid 04/18/2014   • Inguinal hernia 09/05/2012   • HTN (hypertension) 05/29/2012   • Atrial fibrillation, amio 2011 12/02/2011   • Osteoarthritis, spinal fusion 2000 12/02/2011   • Aortic ectasia, thoracic (HCC) 12/02/2011       Allergies:Tape    Current Outpatient Prescriptions Ordered in [facility]   Medication Sig Dispense Refill   • FORMOTEROL FUMARATE INH Inhale  by mouth.     • aspirin 81 MG tablet Take 81 mg by mouth every day.     • Omega-3 Fatty Acids (FISH OIL PO) Take  by mouth.     • Ferrous Sulfate (IRON) 325 (65 Fe) MG Tab Take  by mouth.     • amiodarone (CORDARONE) 200 MG Tab Take 1 Tab by mouth every day. 90 Tab 3   • levothyroxine (SYNTHROID) 88 MCG Tab Take 88 mcg by mouth Every morning on an empty stomach.     • Glucosamine-Chondroit-Vit C-Mn (GLUCOSAMINE 1500 COMPLEX) Cap Take  by mouth.     • losartan-hydrochlorothiazide (HYZAAR) 50-12.5 MG per tablet Take 1 Tab by mouth every day.     • Multiple Vitamin (MULTIVITAMIN PO) Take  by mouth every day.     • budesonide-formoterol (SYMBICORT) 80-4.5 MCG/ACT Aerosol Inhale 2 Puffs by mouth 2 Times a Day. Use spacer. Rinse mouth after each use. (Patient not taking: Reported on 2/18/2019) 3 Inhaler 3     No current Epic-ordered  "facility-administered medications on file.        Past Medical History:   Diagnosis Date   • Arrhythmia    • ASTHMA    • Atrial fibrillation (HCC)    • Back pain    • Chickenpox    • Martiniquais measles    • Hypertension    • Peripheral vascular disease, unspecified (HCC)    • Stroke (HCC)    • Tonsillitis    • Unspecified disorder of thyroid        Social History   Substance Use Topics   • Smoking status: Former Smoker     Packs/day: 1.00     Years: 20.00     Types: Cigarettes     Quit date: 1980   • Smokeless tobacco: Never Used   • Alcohol use 4.2 - 16.8 oz/week     7 Standard drinks or equivalent per week      Comment: 7 DRINKS PER WEEK       Family Status   Relation Status   • Unknown (Not Specified)   • Mo  at age 90   • Fa  at age 90   • Sis  at age 60   • Bro      Family History   Problem Relation Age of Onset   • Cancer Unknown    • No Known Problems Mother    • No Known Problems Father    • Lung Disease Sister    • Cancer Brother        ROS:  Review of Systems   Constitutional: Positive for subjective fever, chills, fatigue, malaise. Negative for weight loss.  HENT: Positive for congestion, ear pressure, and sore throat. Negative for ear pain, nosebleeds, and neck pain.    Eyes: Negative for vision changes.   Cardiovascular: Negative for chest pain, palpitations, orthopnea and leg swelling.   Respiratory: Positive for cough and sputum production. Negative for shortness of breath and wheezing.   Gastrointestinal: Negative for abdominal pain, nausea, vomiting or diarrhea.   Skin: Negative for rash, diaphoresis.     Exam:  Blood pressure 100/70, pulse (!) 58, temperature 36.3 °C (97.3 °F), temperature source Temporal, resp. rate 16, height 1.88 m (6' 2\"), weight 87.1 kg (192 lb), SpO2 97 %.  General: well-nourished, well-developed male in NAD  Head: normocephalic, atraumatic  Eyes: PERRLA, EOM within normal limits, no conjunctival injection, no scleral icterus, visual fields " "and acuity grossly intact.  Ears: normal shape and symmetry, no tenderness, no discharge. External canals are without any significant edema or erythema. Tympanic membranes are without any inflammation, no effusion. Gross auditory acuity is intact.  Nose: symmetrical without tenderness, mild discharge, erythema present bilateral nares.  Mouth/Throat: reasonable hygiene, no exudates or tonsillar enlargement. Mild erythema present.   Neck: no masses, range of motion within normal limits, no tracheal deviation.  Lymph: mild cervical adenopathy. No supraclavicular adenopathy.   Neuro: alert and oriented. Cranial nerves 1-12 grossly intact.   Cardiovascular: regular rate and rhythm without murmurs, rubs, or gallops. No edema.   Pulmonary: no distress. Chest is symmetrical with respiration. No wheezes, crackles, or rhonchi.   Musculoskeletal: appropriate muscle tone, gait is stable.  Skin: warm, dry, intact, no clubbing, no cyanosis.   Psych: appropriate mood, affect, judgement.         Assessment/Plan:  1. Acute lower respiratory tract infection  DX-CHEST-2 VIEWS    doxycycline (VIBRAMYCIN) 100 MG Tab   2. Cough  benzonatate (TESSALON) 100 MG Cap         DX chest reviewed by myself, radiology reading \"No acute cardiopulmonary process is identified. Hyperinflation.\"      Doxy as directed for length of infection. Tessalon for cough. Cheratussin AC for nighttime cough, sedative effects of medication discussed with patient.   Rest, increase fluids, hand and respiratory hygiene.   Supportive care, differential diagnoses, and indications for immediate follow-up discussed with patient.   Pathogenesis of diagnosis discussed including typical length and natural progression.  Instructed to return to clinic or nearest emergency department for any change in condition, further concerns, or worsening of symptoms.  Patient states understanding of the plan of care and discharge instructions.  Instructed to make an appointment with his " primary care provider in the next 3-7 days if not significantly improving and for further care.         Please note that this dictation was created using voice recognition software. I have made every reasonable attempt to correct obvious errors, but I expect that there are errors of grammar and possibly content that I did not discover before finalizing the note.  A mask was worn for the entire visit with the patient.     VIRAJ Sullivan.

## 2019-02-25 ENCOUNTER — TELEPHONE (OUTPATIENT)
Dept: URGENT CARE | Facility: CLINIC | Age: 74
End: 2019-02-25

## 2019-02-25 NOTE — TELEPHONE ENCOUNTER
Patient called office, patient state pharmacy called him to let him know provider call in a refill for doxycycline.  Patient state he is still taking doxycycline and he is not sure he needs the refill.  Check with  provider Ramses Hopkins and he reviewed patient chart and he said just to finish the 10 days. Patient was notified.

## 2019-04-20 ENCOUNTER — HOSPITAL ENCOUNTER (OUTPATIENT)
Dept: LAB | Facility: MEDICAL CENTER | Age: 74
End: 2019-04-20
Attending: FAMILY MEDICINE
Payer: MEDICARE

## 2019-04-20 LAB
BASOPHILS # BLD AUTO: 0.7 % (ref 0–1.8)
BASOPHILS # BLD: 0.04 K/UL (ref 0–0.12)
D DIMER PPP IA.FEU-MCNC: 0.54 UG/ML (FEU) (ref 0–0.5)
EOSINOPHIL # BLD AUTO: 0.32 K/UL (ref 0–0.51)
EOSINOPHIL NFR BLD: 6 % (ref 0–6.9)
ERYTHROCYTE [DISTWIDTH] IN BLOOD BY AUTOMATED COUNT: 50.7 FL (ref 35.9–50)
HCT VFR BLD AUTO: 41 % (ref 42–52)
HGB BLD-MCNC: 13.4 G/DL (ref 14–18)
IMM GRANULOCYTES # BLD AUTO: 0.02 K/UL (ref 0–0.11)
IMM GRANULOCYTES NFR BLD AUTO: 0.4 % (ref 0–0.9)
LYMPHOCYTES # BLD AUTO: 1.26 K/UL (ref 1–4.8)
LYMPHOCYTES NFR BLD: 23.6 % (ref 22–41)
MCH RBC QN AUTO: 31.8 PG (ref 27–33)
MCHC RBC AUTO-ENTMCNC: 32.7 G/DL (ref 33.7–35.3)
MCV RBC AUTO: 97.2 FL (ref 81.4–97.8)
MONOCYTES # BLD AUTO: 0.35 K/UL (ref 0–0.85)
MONOCYTES NFR BLD AUTO: 6.5 % (ref 0–13.4)
NEUTROPHILS # BLD AUTO: 3.36 K/UL (ref 1.82–7.42)
NEUTROPHILS NFR BLD: 62.8 % (ref 44–72)
NRBC # BLD AUTO: 0 K/UL
NRBC BLD-RTO: 0 /100 WBC
PLATELET # BLD AUTO: 354 K/UL (ref 164–446)
PMV BLD AUTO: 10.5 FL (ref 9–12.9)
RBC # BLD AUTO: 4.22 M/UL (ref 4.7–6.1)
T3FREE SERPL-MCNC: 2.74 PG/ML (ref 2.4–4.2)
T4 FREE SERPL-MCNC: 1.53 NG/DL (ref 0.53–1.43)
T4 SERPL-MCNC: 8.1 UG/DL (ref 4–12)
TSH SERPL DL<=0.005 MIU/L-ACNC: 2.1 UIU/ML (ref 0.38–5.33)
WBC # BLD AUTO: 5.4 K/UL (ref 4.8–10.8)

## 2019-04-20 PROCEDURE — 36415 COLL VENOUS BLD VENIPUNCTURE: CPT

## 2019-04-20 PROCEDURE — 84443 ASSAY THYROID STIM HORMONE: CPT

## 2019-04-20 PROCEDURE — 84481 FREE ASSAY (FT-3): CPT

## 2019-04-20 PROCEDURE — 80053 COMPREHEN METABOLIC PANEL: CPT

## 2019-04-20 PROCEDURE — 86765 RUBEOLA ANTIBODY: CPT

## 2019-04-20 PROCEDURE — 85379 FIBRIN DEGRADATION QUANT: CPT

## 2019-04-20 PROCEDURE — 85025 COMPLETE CBC W/AUTO DIFF WBC: CPT

## 2019-04-20 PROCEDURE — 80061 LIPID PANEL: CPT

## 2019-04-20 PROCEDURE — 84439 ASSAY OF FREE THYROXINE: CPT

## 2019-04-20 PROCEDURE — 84479 ASSAY OF THYROID (T3 OR T4): CPT

## 2019-04-20 PROCEDURE — 85362 FIBRIN DEGRADATION PRODUCTS: CPT

## 2019-04-21 LAB
ALBUMIN SERPL BCP-MCNC: 3.9 G/DL (ref 3.2–4.9)
ALBUMIN/GLOB SERPL: 1.8 G/DL
ALP SERPL-CCNC: 47 U/L (ref 30–99)
ALT SERPL-CCNC: 19 U/L (ref 2–50)
ANION GAP SERPL CALC-SCNC: 8 MMOL/L (ref 0–11.9)
AST SERPL-CCNC: 22 U/L (ref 12–45)
BILIRUB SERPL-MCNC: 1.2 MG/DL (ref 0.1–1.5)
BUN SERPL-MCNC: 24 MG/DL (ref 8–22)
CALCIUM SERPL-MCNC: 9 MG/DL (ref 8.5–10.5)
CHLORIDE SERPL-SCNC: 103 MMOL/L (ref 96–112)
CHOLEST SERPL-MCNC: 213 MG/DL (ref 100–199)
CO2 SERPL-SCNC: 25 MMOL/L (ref 20–33)
CREAT SERPL-MCNC: 1.44 MG/DL (ref 0.5–1.4)
FASTING STATUS PATIENT QL REPORTED: NORMAL
GLOBULIN SER CALC-MCNC: 2.2 G/DL (ref 1.9–3.5)
GLUCOSE SERPL-MCNC: 92 MG/DL (ref 65–99)
HDLC SERPL-MCNC: 88 MG/DL
LDLC SERPL CALC-MCNC: 114 MG/DL
POTASSIUM SERPL-SCNC: 3.8 MMOL/L (ref 3.6–5.5)
PROT SERPL-MCNC: 6.1 G/DL (ref 6–8.2)
SODIUM SERPL-SCNC: 136 MMOL/L (ref 135–145)
TRIGL SERPL-MCNC: 53 MG/DL (ref 0–149)

## 2019-04-22 LAB
MEV IGG SER IA-ACNC: 3.35
T3RU NFR SERPL: 38 % (ref 28–41)

## 2019-04-23 LAB — FSP PPP-MCNC: <5 UG/ML

## 2019-05-03 ENCOUNTER — HOSPITAL ENCOUNTER (OUTPATIENT)
Dept: RADIOLOGY | Facility: MEDICAL CENTER | Age: 74
End: 2019-05-03
Attending: FAMILY MEDICINE
Payer: MEDICARE

## 2019-05-03 DIAGNOSIS — R79.1 ABNORMAL COAGULATION PROFILE: ICD-10-CM

## 2019-05-03 DIAGNOSIS — M79.604 RIGHT LEG PAIN: ICD-10-CM

## 2019-05-03 PROCEDURE — 93970 EXTREMITY STUDY: CPT

## 2019-06-14 ENCOUNTER — OFFICE VISIT (OUTPATIENT)
Dept: CARDIOLOGY | Facility: MEDICAL CENTER | Age: 74
End: 2019-06-14
Payer: MEDICARE

## 2019-06-14 VITALS
SYSTOLIC BLOOD PRESSURE: 120 MMHG | OXYGEN SATURATION: 94 % | HEIGHT: 74 IN | DIASTOLIC BLOOD PRESSURE: 72 MMHG | HEART RATE: 56 BPM | BODY MASS INDEX: 24.13 KG/M2 | WEIGHT: 188 LBS

## 2019-06-14 DIAGNOSIS — I15.0 RENOVASCULAR HYPERTENSION: ICD-10-CM

## 2019-06-14 DIAGNOSIS — I77.810 AORTIC ECTASIA, THORACIC (HCC): ICD-10-CM

## 2019-06-14 DIAGNOSIS — I48.19 PERSISTENT ATRIAL FIBRILLATION (HCC): ICD-10-CM

## 2019-06-14 LAB — EKG IMPRESSION: NORMAL

## 2019-06-14 PROCEDURE — 99214 OFFICE O/P EST MOD 30 MIN: CPT | Performed by: INTERNAL MEDICINE

## 2019-06-14 PROCEDURE — 93000 ELECTROCARDIOGRAM COMPLETE: CPT | Performed by: INTERNAL MEDICINE

## 2019-06-14 ASSESSMENT — ENCOUNTER SYMPTOMS
FEVER: 0
BRUISES/BLEEDS EASILY: 0
VOMITING: 0
MYALGIAS: 1
WHEEZING: 0
EYE PAIN: 0
NECK PAIN: 1
DEPRESSION: 0
CHILLS: 0
DIZZINESS: 0
PND: 0
SHORTNESS OF BREATH: 0
NERVOUS/ANXIOUS: 0
BLURRED VISION: 0
LOSS OF CONSCIOUSNESS: 0
PALPITATIONS: 0
COUGH: 0
NAUSEA: 0
EYE DISCHARGE: 0
ABDOMINAL PAIN: 0

## 2019-06-14 NOTE — PROGRESS NOTES
Chief Complaint   Patient presents with   • Atrial Fibrillation     follow up       Subjective:   Giovany Kruger is a 74 y.o. male who presents today in follow-up in regards to his aortic ectasia and paroxysmal atrial fibrillation intolerant of anticoagulation on antiarrhythmic therapy as well as hypertension    Doing well no setbacks, and unable to go camping anymore sometimes it is too much of a hassle  Enjoying his life wife is doing well although she has some pains    Had some pain in his right leg his PCP is working on it with him better with orthotics    Compliant on his medications    Past Medical History:   Diagnosis Date   • Arrhythmia    • ASTHMA    • Atrial fibrillation (HCC)    • Back pain    • Chickenpox    • [place] measles    • Hypertension    • Peripheral vascular disease, unspecified (HCC)    • Stroke (HCC) 1980   • Tonsillitis    • Unspecified disorder of thyroid      Past Surgical History:   Procedure Laterality Date   • INGUINAL HERNIA REPAIR  9/5/2012    Performed by RIGOBERTO MCINTYRE at SURGERY [facility] ORS   • INGUINAL HERNIA REPAIR  11/3/2011    Performed by RIGOBERTO MCINTYRE at SURGERY [facility] ORS   • OTHER  2009    L3-4-5 susion   • OTHER  1980    C1-2-3 fusion   • TONSILLECTOMY  1952   • CARPAL TUNNEL RELEASE     • LAMINOTOMY      L 1,4,5 FUSION C1,2,3 FUSION     Family History   Problem Relation Age of Onset   • Cancer Unknown    • No Known Problems Mother    • No Known Problems Father    • Lung Disease Sister    • Cancer Brother      Social History     Social History   • Marital status:      Spouse name: N/A   • Number of children: N/A   • Years of education: N/A     Occupational History   • Not on file.     Social History Main Topics   • Smoking status: Former Smoker     Packs/day: 1.00     Years: 20.00     Types: Cigarettes     Quit date: 1/1/1980   • Smokeless tobacco: Never Used   • Alcohol use 4.2 - 16.8 oz/week     7 Standard drinks or equivalent per week       Comment: 7 DRINKS PER WEEK   • Drug use: No   • Sexual activity: Not on file     Other Topics Concern   • Not on file     Social History Narrative   • No narrative on file     Allergies   Allergen Reactions   • Tape      Blisters and scarring     Outpatient Encounter Prescriptions as of 6/14/2019   Medication Sig Dispense Refill   • FORMOTEROL FUMARATE INH Inhale  by mouth.     • aspirin 81 MG tablet Take 81 mg by mouth every day.     • Omega-3 Fatty Acids (FISH OIL PO) Take  by mouth.     • Ferrous Sulfate (IRON) 325 (65 Fe) MG Tab Take  by mouth.     • amiodarone (CORDARONE) 200 MG Tab Take 1 Tab by mouth every day. 90 Tab 3   • levothyroxine (SYNTHROID) 88 MCG Tab Take 88 mcg by mouth Every morning on an empty stomach.     • Glucosamine-Chondroit-Vit C-Mn (GLUCOSAMINE 1500 COMPLEX) Cap Take  by mouth.     • losartan-hydrochlorothiazide (HYZAAR) 50-12.5 MG per tablet Take 1 Tab by mouth every day.     • Multiple Vitamin (MULTIVITAMIN PO) Take  by mouth every day.     • [DISCONTINUED] benzonatate (TESSALON) 100 MG Cap Take 1 Cap by mouth 2 times a day as needed for Cough. (Patient not taking: Reported on 6/14/2019) 30 Cap 0   • [DISCONTINUED] budesonide-formoterol (SYMBICORT) 80-4.5 MCG/ACT Aerosol Inhale 2 Puffs by mouth 2 Times a Day. Use spacer. Rinse mouth after each use. (Patient not taking: Reported on 2/18/2019) 3 Inhaler 3     No facility-administered encounter medications on file as of 6/14/2019.      Review of Systems   Constitutional: Negative for chills and fever.   HENT: Negative for congestion and hearing loss.    Eyes: Negative for blurred vision, pain and discharge.   Respiratory: Negative for cough, shortness of breath and wheezing.    Cardiovascular: Negative for chest pain, palpitations, leg swelling and PND.   Gastrointestinal: Negative for abdominal pain, nausea and vomiting.   Musculoskeletal: Positive for myalgias and neck pain. Negative for joint pain.   Skin: Negative for itching and rash.  "  Neurological: Negative for dizziness and loss of consciousness.   Endo/Heme/Allergies: Negative for environmental allergies. Does not bruise/bleed easily.   Psychiatric/Behavioral: Negative for depression. The patient is not nervous/anxious.    All other systems reviewed and are negative.       Objective:   /72 (BP Location: Left arm, Patient Position: Sitting)   Pulse (!) 56   Ht 1.88 m (6' 2\")   Wt 85.3 kg (188 lb)   SpO2 94%   BMI 24.14 kg/m²     Physical Exam   Constitutional: He is oriented to person, place, and time. He appears well-developed and well-nourished. No distress.   ... Patient seen and examined as always, changes noted:   HENT:   Head: Normocephalic and atraumatic.   Mouth/Throat: No oropharyngeal exudate.   Eyes: Pupils are equal, round, and reactive to light. EOM are normal.   Neck: No JVD present. No tracheal deviation present.   Cardiovascular: Normal rate, regular rhythm and intact distal pulses.    No murmur heard.  Pulmonary/Chest: Effort normal and breath sounds normal. No respiratory distress.   Abdominal: Bowel sounds are normal. He exhibits no distension.   Musculoskeletal: Normal range of motion. He exhibits no edema or tenderness.   Neurological: He is alert and oriented to person, place, and time. He exhibits normal muscle tone. Coordination normal.   Skin: Skin is warm and dry.   Psychiatric: He has a normal mood and affect. His behavior is normal.       Assessment:     1. Persistent atrial fibrillation (HCC)  EKG   2. Aortic ectasia, thoracic (HCC)     3. Renovascular hypertension         Medical Decision Making:  Today's Assessment / Status / Plan:     Hypertension  Much controlled with weight loss and diet  Reviewed low-dose medications  Annual blood work with PCP, GFR stable as this is chronic kidney disease    Atrial fibrillation  History of anemia and bruising have precluded systemic anticoagulation  Antiarrhythmic therapy, strategy with monitors has been " used  Continue screening with x-ray EKG which we did today and blood work twelve-lead EKG done today read by me shows sinus bradycardia with first-degree AV block nonspecific ST flattening QRS is 101 ms, QT is 440    Hyperlipidemia/aortic ectasia  I looked at the images of his CAT scan from 2012 as well as his echo from last year.  Stable  No evidence of aneurysm  Exam reassuring today  Discussed statin intolerance in the past      rtc 6m

## 2019-06-26 ENCOUNTER — OFFICE VISIT (OUTPATIENT)
Dept: PULMONOLOGY | Facility: HOSPICE | Age: 74
End: 2019-06-26
Payer: MEDICARE

## 2019-06-26 VITALS
HEART RATE: 55 BPM | WEIGHT: 188.6 LBS | SYSTOLIC BLOOD PRESSURE: 116 MMHG | BODY MASS INDEX: 24.2 KG/M2 | OXYGEN SATURATION: 95 % | DIASTOLIC BLOOD PRESSURE: 58 MMHG | HEIGHT: 74 IN

## 2019-06-26 DIAGNOSIS — R06.02 SOB (SHORTNESS OF BREATH): ICD-10-CM

## 2019-06-26 PROCEDURE — 99214 OFFICE O/P EST MOD 30 MIN: CPT | Mod: 25 | Performed by: INTERNAL MEDICINE

## 2019-06-26 PROCEDURE — 99999 AMB MULTIPLE OXIMETRY: CPT | Performed by: INTERNAL MEDICINE

## 2019-06-26 PROCEDURE — 94761 N-INVAS EAR/PLS OXIMETRY MLT: CPT | Performed by: INTERNAL MEDICINE

## 2019-06-26 NOTE — PROGRESS NOTES
"CC:  Chief Complaint   Patient presents with   • Follow-Up     Last seen on 11/19/2018     Follow-up appointment, asthma    HPI:   The patient is a 74 y.o. male.  With history of atrial fibrillation chronically on amiodarone 100 mg twice daily and history of asthma which was controlled on Symbicort came today for follow-up.    The patient continue to take Symbicort, however he thinks his dyspnea on exertion is getting worse.  Recently he noticed feeling very winded with minimal exercise.  He had a chest x-ray done in February did not show any acute cardiopulmonary disease.    The patient denies any wheezing or cough.  Main symptoms are dyspnea especially on exertion.  He denies any lower extremity swelling or paroxysmal nocturnal dyspnea or orthopnea.  He was recently seen by Dr. Kaylene Mendiola cardiologist there was told that his \"heart is doing very well\" BNP done in February was normal at 52.    Short distance walking test in our clinic today was negative for hypocalcemia.  The patient was slightly bradycardic with heart rate of 55 bpm at baseline.    ROS:   Constitutional: Denies fevers, chills, night sweats  Eyes: Denies vision loss, pain, drainage, double vision  Ears, Nose, Throat: Denies earache, difficulty hearing, tinnitus, nasal congestion, hoarseness  Cardiovascular: Denies chest pain, tightness, palpitations, orthopnea or edema  Respiratory: Please see HPI  GI: Denies heartburn, dysphagia, nausea, abdominal pain, diarrhea or constipation  : Denies frequent urination, hematuria, discharge or painful urination  Musculoskeletal: Denies back pain, painful joints, sore muscles  Neurological: Denies weakness or headaches  Skin: No rashes  All other ROS were negative except what mentioned in the HPI     Past Medical History:  Past Medical History:   Diagnosis Date   • Arrhythmia    • ASTHMA    • Atrial fibrillation (HCC)    • Back pain    • Chickenpox    • [place] measles    • Hypertension    • Peripheral " "vascular disease, unspecified (HCC)    • Stroke (HCC) 1980   • Tonsillitis    • Unspecified disorder of thyroid                Social History:  Social History     Social History   • Marital status:      Spouse name: N/A   • Number of children: N/A   • Years of education: N/A     Occupational History   • Not on file.     Social History Main Topics   • Smoking status: Former Smoker     Packs/day: 1.00     Years: 20.00     Types: Cigarettes     Quit date: 1/1/1980   • Smokeless tobacco: Never Used   • Alcohol use 4.2 - 16.8 oz/week     7 Standard drinks or equivalent per week      Comment: 7 DRINKS PER WEEK   • Drug use: No   • Sexual activity: Not on file     Other Topics Concern   • Not on file     Social History Narrative   • No narrative on file             Family History:  Family History   Problem Relation Age of Onset   • Cancer Unknown    • No Known Problems Mother    • No Known Problems Father    • Lung Disease Sister    • Cancer Brother        Current Outpatient Prescriptions on File Prior to Visit   Medication Sig Dispense Refill   • FORMOTEROL FUMARATE INH Inhale  by mouth.     • aspirin 81 MG tablet Take 81 mg by mouth every day.     • Omega-3 Fatty Acids (FISH OIL PO) Take  by mouth.     • Ferrous Sulfate (IRON) 325 (65 Fe) MG Tab Take  by mouth.     • amiodarone (CORDARONE) 200 MG Tab Take 1 Tab by mouth every day. 90 Tab 3   • levothyroxine (SYNTHROID) 88 MCG Tab Take 88 mcg by mouth Every morning on an empty stomach.     • Glucosamine-Chondroit-Vit C-Mn (GLUCOSAMINE 1500 COMPLEX) Cap Take  by mouth.     • losartan-hydrochlorothiazide (HYZAAR) 50-12.5 MG per tablet Take 1 Tab by mouth every day.     • Multiple Vitamin (MULTIVITAMIN PO) Take  by mouth every day.       No current facility-administered medications on file prior to visit.        Allergies:   Tape and Statins [hmg-coa-r inhibitors]        Vitals:    06/26/19 1310 06/26/19 1313   Height: 1.88 m (6' 2\")    Weight: 85.5 kg (188 lb 9.6 oz) "    Weight % change since last entry.: 0 %    BP: 116/58    Pulse: (!) 55    BMI (Calculated): 24.21    O2 sat % room air:  95 %           Physical Exam:  Appearance:  in no acute distress  HEENT: Normocephalic, atraumatic, white sclera, PERRLA, oropharynx clear  Neck: No adenopathy or masses  Respiratory: no intercostal retractions or accessory muscle use  Lungs auscultation: Clear to auscultation bilaterally  Cardiovascular: Regular rate rhythm. No murmurs, rubs or gallops.  No LE edema  Abdomen: soft, nondistended  Gait: Normal  Digits: No clubbing, cyanosis  Motor: No focal deficits  Orientation: Oriented to time, person and place      DATA:    Labs:  Lab Results   Component Value Date/Time    WBC 5.4 04/20/2019 07:57 AM    RBC 4.22 (L) 04/20/2019 07:57 AM    HEMOGLOBIN 13.4 (L) 04/20/2019 07:57 AM    HEMATOCRIT 41.0 (L) 04/20/2019 07:57 AM    MCV 97.2 04/20/2019 07:57 AM    MCH 31.8 04/20/2019 07:57 AM    MCHC 32.7 (L) 04/20/2019 07:57 AM    MPV 10.5 04/20/2019 07:57 AM    NEUTSPOLYS 62.80 04/20/2019 07:57 AM    LYMPHOCYTES 23.60 04/20/2019 07:57 AM    MONOCYTES 6.50 04/20/2019 07:57 AM    EOSINOPHILS 6.00 04/20/2019 07:57 AM    BASOPHILS 0.70 04/20/2019 07:57 AM    ANISOCYTOSIS 1+ 04/22/2017 07:47 AM      Lab Results   Component Value Date/Time    SODIUM 136 04/20/2019 07:57 AM    POTASSIUM 3.8 04/20/2019 07:57 AM    CHLORIDE 103 04/20/2019 07:57 AM    CO2 25 04/20/2019 07:57 AM    GLUCOSE 92 04/20/2019 07:57 AM    BUN 24 (H) 04/20/2019 07:57 AM    CREATININE 1.44 (H) 04/20/2019 07:57 AM    BUNCREATRAT 16 12/23/2016 08:24 AM        Imaging:  Chest x-ray done in February did not show any increased interstitial markings or any acute or chronic cardiopulmonary disease.    PULMONARY FUNCTION TEST:  Pulmonary function test done more than a year ago did not show any obstruction.  It showed also normal diffusion capacity        Diagnosis:  1. SOB (shortness of breath)  BTYPE NATRIURETIC PEPTIDE    PULMONARY FUNCTION  TESTS -Test requested: Complete Pulmonary Function Test    Multiple Oximetry    Multiple Oximetry        Assessment and Plan   The patient shortness of breath today does not seem to be related to his asthma.  The patient does not have wheezing or cough.  He gets dyspnea on exertion.  I am worried about amiodarone toxicity since he has been taking amiodarone for about 8 years.    I am going to check pulmonary function test and if it is worse than his previous test especially diffusion capacity and FVC we will order more imaging's like CT scan.  In the meanwhile the patient will continue Symbicort.    Also I am going to order BNP to rule out any cardiac etiology for the patient's dyspnea on exertion.  The patient had echo cardiogram on January 2018 which was normal.                      Return in about 4 weeks (around 7/24/2019) for With PFT.        This note was created using voice recognition software. I apologize for any overlooked obvious grammar or  vocabulary mistake

## 2019-06-26 NOTE — PROCEDURES
Multi-Ox Readings  Multi Ox #1 Room air   O2 sat % at rest 98   O2 sat % on exertion 94   O2 sat average on exertion 95   Multi Ox #2     O2 sat % at rest     O2 sat % on exertion     O2 sat average on exertion       Oxygen Use     Oxygen Frequency     Duration of need     Is the patient mobile within the home?     CPAP Use?     BIPAP Use?     Servo Titration

## 2019-07-17 ENCOUNTER — NON-PROVIDER VISIT (OUTPATIENT)
Dept: PULMONOLOGY | Facility: HOSPICE | Age: 74
End: 2019-07-17
Attending: INTERNAL MEDICINE
Payer: MEDICARE

## 2019-07-17 ENCOUNTER — HOSPITAL ENCOUNTER (OUTPATIENT)
Dept: LAB | Facility: MEDICAL CENTER | Age: 74
End: 2019-07-17
Attending: INTERNAL MEDICINE
Payer: MEDICARE

## 2019-07-17 VITALS — BODY MASS INDEX: 24.01 KG/M2 | WEIGHT: 187 LBS

## 2019-07-17 DIAGNOSIS — R06.02 SOB (SHORTNESS OF BREATH): ICD-10-CM

## 2019-07-17 LAB — NT-PROBNP SERPL IA-MCNC: 290 PG/ML (ref 0–125)

## 2019-07-17 PROCEDURE — 83880 ASSAY OF NATRIURETIC PEPTIDE: CPT

## 2019-07-17 PROCEDURE — 94726 PLETHYSMOGRAPHY LUNG VOLUMES: CPT | Performed by: INTERNAL MEDICINE

## 2019-07-17 PROCEDURE — 94060 EVALUATION OF WHEEZING: CPT | Performed by: INTERNAL MEDICINE

## 2019-07-17 PROCEDURE — 94729 DIFFUSING CAPACITY: CPT | Performed by: INTERNAL MEDICINE

## 2019-07-17 PROCEDURE — 36415 COLL VENOUS BLD VENIPUNCTURE: CPT

## 2019-07-17 ASSESSMENT — PULMONARY FUNCTION TESTS
FEV1_LLN: 3.06
FEV1/FVC: 74
FEV1: 3.65
FVC: 5
FEV1/FVC: 72
FEV1/FVC_PERCENT_PREDICTED: 101
FEV1/FVC_PERCENT_PREDICTED: 98
FEV1/FVC_PERCENT_PREDICTED: 101
FEV1_PERCENT_CHANGE: -1
FEV1/FVC_PERCENT_CHANGE: -100
FEV1_PERCENT_CHANGE: 1
FEV1/FVC_PERCENT_LLN: 61
FEV1/FVC_PERCENT_PREDICTED: 73
FVC_LLN: 4.18
FEV1_PERCENT_PREDICTED: 99
FEV1/FVC_PREDICTED: 73
FVC_PERCENT_PREDICTED: 98
FVC: 4.94
FVC_PREDICTED: 5
FEV1_PREDICTED: 3.66
FVC_PERCENT_PREDICTED: 100
FEV1/FVC_PERCENT_PREDICTED: 98
FEV1/FVC: 73.89
FEV1: 3.59
FEV1/FVC: 72
FEV1/FVC_PERCENT_CHANGE: 2
FEV1_PERCENT_PREDICTED: 98

## 2019-07-17 NOTE — PROCEDURES
Technician: ASHOK Lambert    Technician Comment:  Good patient effort & cooperation.  The results of this test meet the ATS/ERS standards for acceptability & reproducibility.  Test was performed on the [employer] Plethysmograph-Elite DX system.  Predicted values were NHanes-3 for spirometry, [facility] for DLCO, ITS for Lung Volumes.  The DLCO was uncorrected for Hgb.  A bronchodilator of Ventolin HFA -2puffs via spacer administered.  DLCO performed during dilation period.    Acceptable and reproducible  FEV1 3.59 L [90%], FVC 5 L [90%], ratio 72%  Flow volume loops normal-appearing  Total lung capacity 8.89 L [113%]  DLCO 25.74 mL's per minute millimeter mercury [96%]  Impression  Normal spirometry no response to bronchodilator.  Evidence of air trapping otherwise normal total lung capacity.  Normal gas transfer.  May be early signs of obstructive lung disease clinically correlate      Interpretation:

## 2019-07-24 ENCOUNTER — OFFICE VISIT (OUTPATIENT)
Dept: PULMONOLOGY | Facility: HOSPICE | Age: 74
End: 2019-07-24
Payer: MEDICARE

## 2019-07-24 ENCOUNTER — APPOINTMENT (OUTPATIENT)
Dept: RADIOLOGY | Facility: IMAGING CENTER | Age: 74
End: 2019-07-24
Attending: INTERNAL MEDICINE
Payer: MEDICARE

## 2019-07-24 VITALS
WEIGHT: 187 LBS | BODY MASS INDEX: 24 KG/M2 | HEART RATE: 56 BPM | RESPIRATION RATE: 16 BRPM | DIASTOLIC BLOOD PRESSURE: 60 MMHG | OXYGEN SATURATION: 98 % | SYSTOLIC BLOOD PRESSURE: 120 MMHG | HEIGHT: 74 IN

## 2019-07-24 DIAGNOSIS — R06.02 SOB (SHORTNESS OF BREATH): ICD-10-CM

## 2019-07-24 PROCEDURE — 99214 OFFICE O/P EST MOD 30 MIN: CPT | Performed by: INTERNAL MEDICINE

## 2019-07-24 PROCEDURE — 71046 X-RAY EXAM CHEST 2 VIEWS: CPT | Mod: TC | Performed by: INTERNAL MEDICINE

## 2019-07-24 NOTE — PROGRESS NOTES
CC:  Chief Complaint   Patient presents with   • Follow-Up     Last Seen 6/26/19   • Results     PFT      Follow-up appointment, dyspnea on exertion    HPI:   The patient is a 74 y.o. male.  With history of mild persistent asthma, was well controlled until few months ago when the patient started to the lobe severe dyspnea on exertion but no cough or wheezing.  I saw the patient few weeks ago we ordered pulmonary function test and BNP and the patient came for the results today.    Symptomatically has shortness of breath even got worse since last time he is currently not able to climb 1 flight of stairs without severe shortness of breath    Pulmonary function test did not show any abnormalities.  Diffusion capacity was normal.  BNP was elevated to 290.  The patient had echocardiogram a year and a half ago and it was unremarkable.  He contacted his cardiologist Dr. Mendiola with that BNP results and she did not think that the patient shortness of breath is related to his heart.  The patient clinically does not look fluid overloaded.  Chest x-ray in our clinic today did not show any acute or chronic abnormalities.    The patient denies orthopnea or paroxysmal nocturnal dyspnea.  He does not have peripheral edema.  No palpitations.  However the patient has been bradycardic with heart rate in low 50s last visit and today's visit.  ROS:   Constitutional: Denies fevers, chills, night sweats  Eyes: Denies vision loss, pain, drainage, double vision  Ears, Nose, Throat: Denies earache, difficulty hearing, tinnitus, nasal congestion, hoarseness  Cardiovascular: Denies chest pain, tightness, palpitations, orthopnea or edema  Respiratory: Please see HPI  GI: Denies heartburn, dysphagia, nausea, abdominal pain, diarrhea or constipation  : Denies frequent urination, hematuria, discharge or painful urination  Musculoskeletal: Denies back pain, painful joints, sore muscles  Neurological: Denies weakness or headaches  Skin: No  rashes  All other ROS were negative except what mentioned in the HPI     Past Medical History:  Past Medical History:   Diagnosis Date   • Arrhythmia    • ASTHMA    • Atrial fibrillation (HCC)    • Back pain    • Chickenpox    • [place] measles    • Hypertension    • Peripheral vascular disease, unspecified (HCC)    • Stroke (HCC) 1980   • Tonsillitis    • Unspecified disorder of thyroid                Social History:  Social History     Social History   • Marital status:      Spouse name: N/A   • Number of children: N/A   • Years of education: N/A     Occupational History   • Not on file.     Social History Main Topics   • Smoking status: Former Smoker     Packs/day: 1.00     Years: 20.00     Types: Cigarettes     Quit date: 1/1/1980   • Smokeless tobacco: Never Used   • Alcohol use 4.2 - 16.8 oz/week     7 Standard drinks or equivalent per week      Comment: 7 DRINKS PER WEEK   • Drug use: No   • Sexual activity: Not on file     Other Topics Concern   • Not on file     Social History Narrative   • No narrative on file             Family History:  Family History   Problem Relation Age of Onset   • Cancer Unknown    • No Known Problems Mother    • No Known Problems Father    • Lung Disease Sister    • Cancer Brother        Current Outpatient Prescriptions on File Prior to Visit   Medication Sig Dispense Refill   • FORMOTEROL FUMARATE INH Inhale  by mouth.     • aspirin 81 MG tablet Take 81 mg by mouth every day.     • Omega-3 Fatty Acids (FISH OIL PO) Take  by mouth.     • Ferrous Sulfate (IRON) 325 (65 Fe) MG Tab Take  by mouth.     • amiodarone (CORDARONE) 200 MG Tab Take 1 Tab by mouth every day. 90 Tab 3   • levothyroxine (SYNTHROID) 88 MCG Tab Take 88 mcg by mouth Every morning on an empty stomach.     • Glucosamine-Chondroit-Vit C-Mn (GLUCOSAMINE 1500 COMPLEX) Cap Take  by mouth.     • losartan-hydrochlorothiazide (HYZAAR) 50-12.5 MG per tablet Take 1 Tab by mouth every day.     • Multiple Vitamin  "(MULTIVITAMIN PO) Take  by mouth every day.       No current facility-administered medications on file prior to visit.        Allergies:   Tape and Statins [hmg-coa-r inhibitors]        Vitals:    07/24/19 1511   Height: 1.88 m (6' 2\")   Weight: 84.8 kg (187 lb)   Weight % change since last entry.: 0 %   BP: 120/60   Pulse: (!) 56   BMI (Calculated): 24.01   Resp: 16           Physical Exam:  Appearance:  in no acute distress  HEENT: Normocephalic, atraumatic, white sclera, PERRLA, oropharynx clear  Neck: No adenopathy or masses  Respiratory: no intercostal retractions or accessory muscle use  Lungs auscultation: Clear to auscultation bilaterally  Cardiovascular: Regular rate rhythm. No murmurs, rubs or gallops.  No LE edema  Abdomen: soft, nondistended  Gait: Normal  Digits: No clubbing, cyanosis  Motor: No focal deficits  Orientation: Oriented to time, person and place      DATA:    Labs:  Lab Results   Component Value Date/Time    WBC 5.4 04/20/2019 07:57 AM    RBC 4.22 (L) 04/20/2019 07:57 AM    HEMOGLOBIN 13.4 (L) 04/20/2019 07:57 AM    HEMATOCRIT 41.0 (L) 04/20/2019 07:57 AM    MCV 97.2 04/20/2019 07:57 AM    MCH 31.8 04/20/2019 07:57 AM    MCHC 32.7 (L) 04/20/2019 07:57 AM    MPV 10.5 04/20/2019 07:57 AM    NEUTSPOLYS 62.80 04/20/2019 07:57 AM    LYMPHOCYTES 23.60 04/20/2019 07:57 AM    MONOCYTES 6.50 04/20/2019 07:57 AM    EOSINOPHILS 6.00 04/20/2019 07:57 AM    BASOPHILS 0.70 04/20/2019 07:57 AM    ANISOCYTOSIS 1+ 04/22/2017 07:47 AM      Lab Results   Component Value Date/Time    SODIUM 136 04/20/2019 07:57 AM    POTASSIUM 3.8 04/20/2019 07:57 AM    CHLORIDE 103 04/20/2019 07:57 AM    CO2 25 04/20/2019 07:57 AM    GLUCOSE 92 04/20/2019 07:57 AM    BUN 24 (H) 04/20/2019 07:57 AM    CREATININE 1.44 (H) 04/20/2019 07:57 AM    BUNCREATRAT 16 12/23/2016 08:24 AM        Imaging:  Please see HPI  ECHOCARDIOGRAM:  Please see HPI    PULMONARY FUNCTION TEST:  Please see HPI        Diagnosis:  1. SOB (shortness of " breath)  DX-CHEST-2 VIEWS    CT-CHEST (THORAX) W/O    EC-ECHOCARDIOGRAM COMPLETE W/O CONT        Assessment and Plan   Unclear etiology for the patient's dyspnea on exertion, doubt very much it is related to his asthma.  The patient does not have any wheezing or cough.  I still think probably component of his shortness of breath is related to his heart disease with elevated BNP.   amiodarone toxicity could be also a possibility.  However his pulmonary function test does not show any abnormal findings to suggest amiodarone toxicity.    I am going to check echocardiogram and CT chest.  Patient will follow-up with us after the studies.  Hopefully the study will reveal an etiology of his dyspnea on exertion.                         Return in about 4 weeks (around 8/21/2019) for with ct and echo .        This note was created using voice recognition software. I apologize for any overlooked obvious grammar or  vocabulary mistake

## 2019-08-14 ENCOUNTER — HOSPITAL ENCOUNTER (OUTPATIENT)
Dept: CARDIOLOGY | Facility: MEDICAL CENTER | Age: 74
End: 2019-08-14
Attending: INTERNAL MEDICINE
Payer: MEDICARE

## 2019-08-14 ENCOUNTER — HOSPITAL ENCOUNTER (OUTPATIENT)
Dept: RADIOLOGY | Facility: MEDICAL CENTER | Age: 74
End: 2019-08-14
Attending: INTERNAL MEDICINE
Payer: MEDICARE

## 2019-08-14 DIAGNOSIS — R06.02 SOB (SHORTNESS OF BREATH): ICD-10-CM

## 2019-08-14 LAB
LV EJECT FRACT  99904: 65
LV EJECT FRACT MOD 2C 99903: 54.34
LV EJECT FRACT MOD 4C 99902: 64.91
LV EJECT FRACT MOD BP 99901: 61.74

## 2019-08-14 PROCEDURE — 71250 CT THORAX DX C-: CPT

## 2019-08-14 PROCEDURE — 93306 TTE W/DOPPLER COMPLETE: CPT | Mod: 26 | Performed by: INTERNAL MEDICINE

## 2019-08-14 PROCEDURE — 93306 TTE W/DOPPLER COMPLETE: CPT

## 2019-08-21 ENCOUNTER — OFFICE VISIT (OUTPATIENT)
Dept: PULMONOLOGY | Facility: HOSPICE | Age: 74
End: 2019-08-21
Payer: MEDICARE

## 2019-08-21 VITALS
HEIGHT: 74 IN | DIASTOLIC BLOOD PRESSURE: 62 MMHG | WEIGHT: 190.8 LBS | BODY MASS INDEX: 24.49 KG/M2 | SYSTOLIC BLOOD PRESSURE: 116 MMHG | RESPIRATION RATE: 16 BRPM | HEART RATE: 85 BPM | OXYGEN SATURATION: 96 %

## 2019-08-21 DIAGNOSIS — R91.1 LUNG NODULE: ICD-10-CM

## 2019-08-21 DIAGNOSIS — R06.02 SOB (SHORTNESS OF BREATH): ICD-10-CM

## 2019-08-21 PROCEDURE — 99214 OFFICE O/P EST MOD 30 MIN: CPT | Performed by: INTERNAL MEDICINE

## 2019-08-21 NOTE — PROGRESS NOTES
CC:  Chief Complaint   Patient presents with   • Follow-Up     CT, Echo results     Follow-up appointment, dyspnea on exertion    HPI:   The patient is a 74 y.o. male with history of asthma was previously well controlled came today for follow-up.  For the last few months the patient developed dyspnea on exertion with minimal activities.  He thinks it is intervening of his activity of daily living.  Even though he is able to go to the gym and able to walk his dogs tolerance to exercise but is much worse now.  The patient has asthma he is on Symbicort.  He does not have any wheezing or cough with his symptoms.    The patient is on amiodarone since 2011 for history of atrial fibrillation and for rhythm control.  He takes 200 mg daily.    Repeat echocardiogram and CT scan today were on revealing.  The patient had slight elevation in BNP, Dr. Mendiola's cardiologist did not think this is contributing to his shortness of breath.  The patient does not have any interstitial process on CT scan to suggest amiodarone toxicity.  ROS:   Constitutional: Denies fevers, chills, night sweats  Eyes: Denies vision loss, pain, drainage, double vision  Ears, Nose, Throat: Denies earache, difficulty hearing, tinnitus, nasal congestion, hoarseness  Cardiovascular: Denies chest pain, tightness, palpitations, orthopnea or edema  Respiratory: Please see HPI  GI: Denies heartburn, dysphagia, nausea, abdominal pain, diarrhea or constipation  : Denies frequent urination, hematuria, discharge or painful urination  Musculoskeletal: Denies back pain, painful joints, sore muscles  Neurological: Denies weakness or headaches  Skin: No rashes  All other ROS were negative except what mentioned in the HPI     Past Medical History:  Past Medical History:   Diagnosis Date   • Arrhythmia    • ASTHMA    • Atrial fibrillation (HCC)    • Back pain    • Chickenpox    • [place] measles    • Hypertension    • Peripheral vascular disease, unspecified (HCC)    •  Stroke (HCC)    • Tonsillitis    • Unspecified disorder of thyroid                Social History:  Social History     Socioeconomic History   • Marital status:      Spouse name: Not on file   • Number of children: Not on file   • Years of education: Not on file   • Highest education level: Not on file   Occupational History   • Not on file   Social Needs   • Financial resource strain: Not on file   • Food insecurity:     Worry: Not on file     Inability: Not on file   • Transportation needs:     Medical: Not on file     Non-medical: Not on file   Tobacco Use   • Smoking status: Former Smoker     Packs/day: 1.00     Years: 20.00     Pack years: 20.00     Types: Cigarettes     Last attempt to quit: 1980     Years since quittin.6   • Smokeless tobacco: Never Used   Substance and Sexual Activity   • Alcohol use: Yes     Alcohol/week: 4.2 - 16.8 oz     Types: 7 Standard drinks or equivalent per week     Comment: 7 DRINKS PER WEEK   • Drug use: No   • Sexual activity: Not on file   Lifestyle   • Physical activity:     Days per week: Not on file     Minutes per session: Not on file   • Stress: Not on file   Relationships   • Social connections:     Talks on phone: Not on file     Gets together: Not on file     Attends [employer] service: Not on file     Active member of club or organization: Not on file     Attends meetings of clubs or organizations: Not on file     Relationship status: Not on file   • Intimate partner violence:     Fear of current or ex partner: Not on file     Emotionally abused: Not on file     Physically abused: Not on file     Forced sexual activity: Not on file   Other Topics Concern   • Not on file   Social History Narrative   • Not on file             Family History:  Family History   Problem Relation Age of Onset   • Cancer Other    • No Known Problems Mother    • No Known Problems Father    • Lung Disease Sister    • Cancer Brother        Current Outpatient Medications on File  "Prior to Visit   Medication Sig Dispense Refill   • FORMOTEROL FUMARATE INH Inhale  by mouth.     • aspirin 81 MG tablet Take 81 mg by mouth every day.     • Omega-3 Fatty Acids (FISH OIL PO) Take  by mouth.     • Ferrous Sulfate (IRON) 325 (65 Fe) MG Tab Take  by mouth.     • amiodarone (CORDARONE) 200 MG Tab Take 1 Tab by mouth every day. 90 Tab 3   • levothyroxine (SYNTHROID) 88 MCG Tab Take 88 mcg by mouth Every morning on an empty stomach.     • Glucosamine-Chondroit-Vit C-Mn (GLUCOSAMINE 1500 COMPLEX) Cap Take  by mouth.     • losartan-hydrochlorothiazide (HYZAAR) 50-12.5 MG per tablet Take 1 Tab by mouth every day.     • Multiple Vitamin (MULTIVITAMIN PO) Take  by mouth every day.       No current facility-administered medications on file prior to visit.        Allergies:   Tape and Statins [hmg-coa-r inhibitors]        Vitals:    08/21/19 1230 08/21/19 1235   Height: 1.88 m (6' 2\")    Weight: 86.5 kg (190 lb 12.8 oz)    Weight % change since last entry.: 0 %    BP: 116/62    Pulse: 85    BMI (Calculated): 24.5    Resp: 16    O2 sat % room air:  96 %           Physical Exam:  Appearance:  in no acute distress  HEENT: Normocephalic, atraumatic, white sclera, PERRLA, oropharynx clear  Neck: No adenopathy or masses  Respiratory: no intercostal retractions or accessory muscle use  Lungs auscultation: Clear to auscultation bilaterally  Cardiovascular: Regular rate rhythm. No murmurs, rubs or gallops.  No LE edema  Abdomen: soft, nondistended  Gait: Normal  Digits: No clubbing, cyanosis  Motor: No focal deficits  Orientation: Oriented to time, person and place      DATA:    Labs:  Lab Results   Component Value Date/Time    WBC 5.4 04/20/2019 07:57 AM    RBC 4.22 (L) 04/20/2019 07:57 AM    HEMOGLOBIN 13.4 (L) 04/20/2019 07:57 AM    HEMATOCRIT 41.0 (L) 04/20/2019 07:57 AM    MCV 97.2 04/20/2019 07:57 AM    MCH 31.8 04/20/2019 07:57 AM    MCHC 32.7 (L) 04/20/2019 07:57 AM    MPV 10.5 04/20/2019 07:57 AM    NEUTSPOLYS " 62.80 04/20/2019 07:57 AM    LYMPHOCYTES 23.60 04/20/2019 07:57 AM    MONOCYTES 6.50 04/20/2019 07:57 AM    EOSINOPHILS 6.00 04/20/2019 07:57 AM    BASOPHILS 0.70 04/20/2019 07:57 AM    ANISOCYTOSIS 1+ 04/22/2017 07:47 AM      Lab Results   Component Value Date/Time    SODIUM 136 04/20/2019 07:57 AM    POTASSIUM 3.8 04/20/2019 07:57 AM    CHLORIDE 103 04/20/2019 07:57 AM    CO2 25 04/20/2019 07:57 AM    GLUCOSE 92 04/20/2019 07:57 AM    BUN 24 (H) 04/20/2019 07:57 AM    CREATININE 1.44 (H) 04/20/2019 07:57 AM    BUNCREATRAT 16 12/23/2016 08:24 AM        Imaging:  CT scan done prior to today's visit also was unremarkable other than 5 mm groundglass lung nodule.  ECHOCARDIOGRAM:  Echocardiogram done prior to today's visit was normal.    PULMONARY FUNCTION TEST:  The patient had normal pulmonary function test done prior to last visit.        Diagnosis:  1. SOB (shortness of breath)  PULMONARY FUNCTION TESTS -Test requested: Other (add comment); Include MIPS/MEPS? (cardiopulmonary exercise test )   2. Lung nodule          -The patient has history of asthma that was well controlled with Symbicort until few months ago when the patient started to have progressive dyspnea on exertion.  The patient did not have any worsening wheezing or cough.  This shortness of breath is probably not secondary to his asthma.  So far repeat pulmonary function test, CT chest and echocardiogram were all normal.  The patient takes amiodarone however there is no evidence of amiodarone toxicity on pulmonary function tests or CT scan.  The patient has history of A. fib.  He also has slightly elevated BNP done few weeks ago however Dr. Mendiola his cardiologist does not think this is contributing to his shortness of breath especially with normal echocardiogram.    At this point I am not able to figure out the etiology of this patient's subjective exertional dyspnea.  I am going to schedule him to have cardiopulmonary exercise test.  If that test is  not repeating the patient probably will need cardiac stress test to rule out ischemic heart disease as the cause of his shortness of breath.  I will discuss with Dr. Mendiola the patient cardiologist prior to his next visit.    -Regarding the patient's lung nodule it is 5 mm groundglass.  We will address the need to repeat CT with the patient comes back next visit                    Return in about 4 weeks (around 9/18/2019).        This note was created using voice recognition software. I apologize for any overlooked obvious grammar or  vocabulary mistake

## 2019-09-04 ENCOUNTER — HOSPITAL ENCOUNTER (OUTPATIENT)
Dept: PULMONOLOGY | Facility: MEDICAL CENTER | Age: 74
End: 2019-09-04
Attending: INTERNAL MEDICINE
Payer: MEDICARE

## 2019-09-04 DIAGNOSIS — R06.02 SOB (SHORTNESS OF BREATH): ICD-10-CM

## 2019-09-04 PROCEDURE — 94621 CARDIOPULM EXERCISE TESTING: CPT

## 2019-09-18 ENCOUNTER — TELEPHONE (OUTPATIENT)
Dept: CARDIOLOGY | Facility: MEDICAL CENTER | Age: 74
End: 2019-09-18

## 2019-09-18 ENCOUNTER — PATIENT MESSAGE (OUTPATIENT)
Dept: CARDIOLOGY | Facility: MEDICAL CENTER | Age: 74
End: 2019-09-18

## 2019-09-18 DIAGNOSIS — I48.19 PERSISTENT ATRIAL FIBRILLATION (HCC): ICD-10-CM

## 2019-09-18 DIAGNOSIS — R06.02 SOB (SHORTNESS OF BREATH): ICD-10-CM

## 2019-09-18 NOTE — TELEPHONE ENCOUNTER
Giovany Kruger  You 5 minutes ago (1:46 PM)         I had a full pulmonary stress test on September 4.  My next appointment with Dr. Mendiola is not until December 16. I would like the two tests scheduled as soon as possible. My breathing issue needs to be addressed sooner than December 16.   Thank you,   Fabio Kruger        NM-Stress test and Graham ordered, provided pt scheduling phone number to schedule testing

## 2019-09-18 NOTE — TELEPHONE ENCOUNTER
Giovany Kruger 12 minutes ago (2:08 PM)         Your portal message mentioned 3 tests: Overnight oxygen, Zio and stress test.  Please schedule all three recommended tests if my pulmonary test was not the correct test for 'Stress test'  Hopefully these will find my problem   Thank you,   Fabio Kruger        OPO ordered.     Pt provided imaging phone number again to call and schedule testing ordered

## 2019-09-18 NOTE — TELEPHONE ENCOUNTER
Giovany Kruger Letitia L, M.D. 1 hour ago (9:19 AM)         I just got a call from Dr. Neville about my breathing issue.  He said all my tests are well within normal limits.  He still thinks the problem is my heart and will contact you to discuss this.  I have minor random cough which appears to be from my throat, not chest.  No Afib feelings with my heart beat.  My breathing is a big problem for me with very little activity causing  shortness in breath.  On the breathing effort scale of ten per the chart at my pulmonary test on September 4, I an usually on a 2 with minor activity. Any higher activity such as working out or cutting the grass rapidly puts me at a 5 or 6.  I have a problem and hope you and Dr. Neville can come up with some ideas for treatment.     Thank you,     Fabio Mendiola M.D.  You 10 minutes ago (10:40 AM)      Got it --     Let him know we chatted - he suggested another MPI to r/o CAD and zio to r/o AF/RVR   Happy to order, or can wait to discuss with me or CECY...     Did he get on OPO yet?   I cant tell - lets do it if not     Let me know and ty!    Routing comment          Pt notified through [facility] of Dr Mendiola's recommendations

## 2019-09-18 NOTE — PROGRESS NOTES
Exercise:  Pt started exercise at 0.58 minute reached AT at 11.23 minute and VO2 Max at 12.59 minutes   Pt was able to reach 5.4 METS at AT and 6.5 METS at  Maximum exercise which is 64% of predicted and 78% of predicted respectively   HR was 106 BPM at maximum exercise which is only 72% of predicted maximum heart rate    Spirometry   Pt had normal Spirometry with no restrictive or obstructive patterns      Respiratory Data:  Ventilatory Mechanics:  Minute ventilation was 79.1 L/m at Anaerobic Threshold,56% of predicted maximum minute ventilation   Minute ventilation was  121.3 L/M at VO2 max which is 87% of predicted maximum minute ventilation   Above values suggest that the patient was close to reach his ventilatory mechanics limitations with minute ventilation reaching 87% of his predicted maximum minute ventilation    Gas Exchange:   the patient had elevated VE/Vco2 to 47 at AT and 49at VO2max. This is suggestive of  abnormal gas exchange, which is  none specific finding can be seen in dead space ventilation  or heart failure or pulmonary HTN. However, Pt did not have dead space ventilation with estimated Vd/Vt remained <30 during the test.     Diffusion:  SpO2  Was 94% at rest, 91% at AT and 95% at VO2max suggesting normal diffusion with no diffusion limitation.       Cardiac Data   Heart Rate:  HR was 106 BPM at maximum exercise which is only 72% of predicted    Anaerobic threshold:  Vo2 at AT was 65% of predicted VO2 MAX which is normal  N2Mdqvh (VO2/HR) was 18 at VO2max,107% of predicted.       EKG   Pt remained in sinus rhythm during the test. No EKG sings of ischemia     Impression   Pt has normal spirometry  Pt mild decreased exercise capacity. Pt was able to reach 5.4 METS at AT and 6.5 METS at  Maximum exercise which is 60 % of predicted and 70 % of predicted respectively  Heart rate increased to106 bmp at VO2 max which is only 72% of predicated maximum HR  Gas exchange abnormalities were evident by  increased VE/Vco2 which is suggestive of decreased cardiac output or pulmonary HTN, however this finding is nonspecific and clinical correlation is required  no dead space ventilation with Vd/Vt estimated to be less than 30%    In conclusion:  Mildly reduced exercise capacity, no clear pulmonary cause. the patient has normal spirometry and normal diffusion.   Possible explanation is CHF or pulmonary HTN. Clinical correlation is required

## 2019-09-25 ENCOUNTER — HOSPITAL ENCOUNTER (OUTPATIENT)
Dept: RADIOLOGY | Facility: MEDICAL CENTER | Age: 74
End: 2019-09-25
Attending: INTERNAL MEDICINE
Payer: MEDICARE

## 2019-09-25 ENCOUNTER — TELEPHONE (OUTPATIENT)
Dept: CARDIOLOGY | Facility: MEDICAL CENTER | Age: 74
End: 2019-09-25

## 2019-09-25 DIAGNOSIS — R06.02 SOB (SHORTNESS OF BREATH): ICD-10-CM

## 2019-09-25 DIAGNOSIS — I48.19 PERSISTENT ATRIAL FIBRILLATION (HCC): ICD-10-CM

## 2019-09-25 PROCEDURE — 93018 CV STRESS TEST I&R ONLY: CPT | Performed by: INTERNAL MEDICINE

## 2019-09-25 PROCEDURE — 78452 HT MUSCLE IMAGE SPECT MULT: CPT | Mod: 26 | Performed by: INTERNAL MEDICINE

## 2019-09-25 PROCEDURE — 700111 HCHG RX REV CODE 636 W/ 250 OVERRIDE (IP)

## 2019-09-25 PROCEDURE — A9502 TC99M TETROFOSMIN: HCPCS

## 2019-09-25 RX ORDER — REGADENOSON 0.08 MG/ML
INJECTION, SOLUTION INTRAVENOUS
Status: COMPLETED
Start: 2019-09-25 | End: 2019-09-25

## 2019-09-25 RX ADMIN — REGADENOSON 0.4 MG: 0.08 INJECTION, SOLUTION INTRAVENOUS at 10:49

## 2019-09-25 NOTE — TELEPHONE ENCOUNTER
Result Notes for NM-CARDIAC STRESS TEST   Notes recorded by Hyun Mendiola M.D. on 9/25/2019 at 2:55 PM PDT  Great news strong and normal heart no evidence of blockages.  Reassuring, ruling that out for now…       Called pt, s/w wife (Jannet), discussed Stress test per Dr Mendiola, wife verbalizes understanding

## 2019-09-25 NOTE — PROGRESS NOTES
Patient presents to NM suite for cardiac stress test with MPI. Nursing goals identified: knowledge deficit, potential for anxiety r/t stress test, potential for compromised cardiac output. Care plan includes educating patient, reassurance and access to ACLS cart/team. Labs and ECG reviewed. No caffeine and NPO confirmed. Resting images attained and patient prepped for pharmacological stress study. Lexiscan given while patient ambulated on TM x 2 mins. Patient reported these symptoms: MILD SOB, FLUSHING. Caffeinated beverage provided. Symptoms resolved.

## 2019-10-02 ENCOUNTER — TELEPHONE (OUTPATIENT)
Dept: CARDIOLOGY | Facility: MEDICAL CENTER | Age: 74
End: 2019-10-02

## 2019-10-02 ENCOUNTER — NON-PROVIDER VISIT (OUTPATIENT)
Dept: CARDIOLOGY | Facility: MEDICAL CENTER | Age: 74
End: 2019-10-02
Attending: INTERNAL MEDICINE
Payer: MEDICARE

## 2019-10-02 DIAGNOSIS — I48.19 PERSISTENT ATRIAL FIBRILLATION (HCC): ICD-10-CM

## 2019-10-02 DIAGNOSIS — R00.1 BRADYCARDIA: ICD-10-CM

## 2019-10-02 DIAGNOSIS — R06.02 SOB (SHORTNESS OF BREATH): ICD-10-CM

## 2019-10-08 ENCOUNTER — TELEPHONE (OUTPATIENT)
Dept: CARDIOLOGY | Facility: MEDICAL CENTER | Age: 74
End: 2019-10-08

## 2019-10-08 NOTE — TELEPHONE ENCOUNTER
Result Notes for OVERNIGHT PULSE OXIMETRY   Notes recorded by Hyun Mendiola M.D. on 10/8/2019 at 11:00 AM PDT  Nocturnal oxygen is low, oxygen at night might help and is covered by his insurance plan.  Happy to order if needed     Attempted to call pt, no answer, left vm to call back

## 2019-10-09 NOTE — TELEPHONE ENCOUNTER
Called pt, discussed OPO result and Dr Mendiola's recommendations, pt reports he prefers to discussed the result with his Pulmonologist first-Dr Neville.

## 2019-10-23 ENCOUNTER — TELEPHONE (OUTPATIENT)
Dept: CARDIOLOGY | Facility: MEDICAL CENTER | Age: 74
End: 2019-10-23

## 2019-10-23 NOTE — TELEPHONE ENCOUNTER
Result Notes for ZIO PATCH MONITOR   Notes recorded by Hyun Mendiola M.D. on 10/22/2019 at 5:35 PM PDT  Slow in the middle of the night briefly -- if feeling well - we would repeat this in 1-2m  Is he fainting?  Other sxs?    Let me know (I will read formally tomorrow!)  TY!       Called pt, discussed Zio per Dr Mendiola and her recommendations, denies any symptoms, pt reports he has upcoming appt w/ Dr Mendiola on December and will just discussed repeat testing during that time, advise pt to contact our office if he becomes symptomatic before then, pt understands

## 2019-10-25 PROCEDURE — 0298T PR EXT ECG > 48HR TO 21 DAY REVIEW AND INTERPRETATN: CPT | Performed by: INTERNAL MEDICINE

## 2019-10-25 PROCEDURE — 0296T PR EXT ECG > 48HR TO 21 DAY RCRD W/CONECT INTL RCRD: CPT | Performed by: INTERNAL MEDICINE

## 2020-01-22 ENCOUNTER — OFFICE VISIT (OUTPATIENT)
Dept: CARDIOLOGY | Facility: PHYSICIAN GROUP | Age: 75
End: 2020-01-22
Payer: MEDICARE

## 2020-01-22 VITALS
HEIGHT: 74 IN | WEIGHT: 190 LBS | OXYGEN SATURATION: 92 % | DIASTOLIC BLOOD PRESSURE: 74 MMHG | HEART RATE: 58 BPM | SYSTOLIC BLOOD PRESSURE: 122 MMHG | BODY MASS INDEX: 24.38 KG/M2

## 2020-01-22 DIAGNOSIS — J45.30 MILD PERSISTENT ASTHMA WITHOUT COMPLICATION: ICD-10-CM

## 2020-01-22 DIAGNOSIS — R06.02 SOB (SHORTNESS OF BREATH): ICD-10-CM

## 2020-01-22 DIAGNOSIS — I77.810 AORTIC ECTASIA, THORACIC (HCC): ICD-10-CM

## 2020-01-22 DIAGNOSIS — I48.19 PERSISTENT ATRIAL FIBRILLATION (HCC): ICD-10-CM

## 2020-01-22 DIAGNOSIS — R00.1 BRADYCARDIA: ICD-10-CM

## 2020-01-22 DIAGNOSIS — I10 ESSENTIAL HYPERTENSION: ICD-10-CM

## 2020-01-22 PROCEDURE — 99214 OFFICE O/P EST MOD 30 MIN: CPT | Performed by: INTERNAL MEDICINE

## 2020-01-22 RX ORDER — DABIGATRAN ETEXILATE 150 MG/1
150 CAPSULE ORAL 2 TIMES DAILY
Qty: 180 CAP | Refills: 3 | Status: SHIPPED
Start: 2020-01-22 | End: 2020-03-03 | Stop reason: CLARIF

## 2020-01-22 NOTE — LETTER
[facility]   [address] [place] [address]  Phone: [phone]  Fax: [phone]              Giovany Kruger  1945    Encounter Date: 1/22/2020    Blanca Pink M.D.          PROGRESS NOTE:  Subjective:   Chief Complaint:   Chief Complaint   Patient presents with   • Atrial Fibrillation       Giovany Kruger is a 74 y.o. male who returns today for paroxysmal atrial fibrillation, intolerant to anticoagulation, under antiarrhythmic medication, aortic ectasia and hypertension.    Saw Dr. Hyun Mendiola about 10 years ago for afib.  He had noted palpitations for years.    Had CVA in 1980 after fall, broken neck.   Notes mild LLE drag and very mid left handed coordination. Not clear if they were related.    Has hypertension, blood pressure controlled on 2 meds.  Has mild hyperlipidemia, LDL cholesterol 114.    Remains on amiodarone for his A. fib.  Normal liver and TSH function April 2019.  Prior bruising but no serious bleeding.  Terms of aortic ectasia, echo in 2019 with a sending aorta 3.8 cm, CT scan and 2019 with a sending aorta 3.9 cm.    He is not limited by chest pain, pressure or tightness with activity.   No significant dyspnea on exertion, orthopnea or lower extremity swelling.   Prior GAY before, resolved spontaneously. PFTS normal 7-2019  No significant palpitations, lightheadedness, or presyncope/syncope.   No symptoms of leg claudication.   No stroke/TIA like symptoms.    No family history of premature coronary artery disease.  Former smoker, quit in his 20s.  No history of diabetes.  No history of autoimmune disease such as lupus or rheumatoid arthritis.  No chronic kidney disease.    He just moved from [place] to [place].  From [place], moved in 2006, liked the area.      DATA REVIEWED by me:  ECG 6/14/2019  Sinus, 83, first-degree AV delay, delayed R wave progression, left anterior fascicular block,  nonspecific T wave changes    o 10-22-19  Sinus rhythm with variable rate response.  Noted nocturnal bradycardia - no concurrent diary entries.  No noted atrial fibrillation.    Echo 8/14/2019  Compared to the images of the prior study done on 01/24/18, no   significant change.  Left ventricular ejection fraction is visually estimated to be 65%.  Mild tricuspid regurgitation.  Estimated right ventricular systolic pressure  is 30 mmHg.  Ascending aorta diameter is 3.8 cm.    Nuclear stress test 9/25/2019   normal perfusion, EF 58%    CT chest 8/14/2019  Ascending aorta 3.9 cm, 5 mm groundglass nodule.    PFTS: 7-17-19  Impression  Normal spirometry no response to bronchodilator.  Evidence of air trapping otherwise normal total lung capacity.  Normal gas transfer.  May be early signs of obstructive lung disease clinically correlate      Most recent labs:     4/20/2019 hemoglobin 13.4, MCV 97, platelets 354, sodium 136, testing 3.8, creatinine 1.44, GFR 48, LFTs normal, TSH 2.1, proBNP 290, , HDL 88, triglycerides 53, total cholesterol 218    Past Medical History:   Diagnosis Date   • Arrhythmia    • ASTHMA    • Atrial fibrillation (HCC)    • Back pain    • Chickenpox    • Nigerien measles    • Hypertension    • Peripheral vascular disease, unspecified (HCC)    • Stroke (HCC) 1980   • Tonsillitis    • Unspecified disorder of thyroid      Past Surgical History:   Procedure Laterality Date   • INGUINAL HERNIA REPAIR  9/5/2012    Performed by RIGOBERTO MCINTYRE at SURGERY [facility] ORS   • INGUINAL HERNIA REPAIR  11/3/2011    Performed by RIGOBERTO MCINTYRE at SURGERY [facility] ORS   • OTHER  2009    L3-4-5 susion   • OTHER  1980    C1-2-3 fusion   • TONSILLECTOMY  1952   • CARPAL TUNNEL RELEASE     • LAMINOTOMY      L 1,4,5 FUSION C1,2,3 FUSION     Family History   Problem Relation Age of Onset   • Cancer Other    • No Known Problems Mother    • No Known Problems Father    • Lung Disease Sister    • Cancer Brother        Social History     Socioeconomic History   • Marital status:      Spouse name: Not on file   • Number of children: Not on file   • Years of education: Not on file   • Highest education level: Not on file   Occupational History   • Not on file   Social Needs   • Financial resource strain: Not on file   • Food insecurity:     Worry: Not on file     Inability: Not on file   • Transportation needs:     Medical: Not on file     Non-medical: Not on file   Tobacco Use   • Smoking status: Former Smoker     Packs/day: 1.00     Years: 20.00     Pack years: 20.00     Types: Cigarettes     Last attempt to quit: 1980     Years since quittin.0   • Smokeless tobacco: Never Used   Substance and Sexual Activity   • Alcohol use: Yes     Alcohol/week: 4.2 - 16.8 oz     Types: 7 Standard drinks or equivalent per week     Comment: 7 DRINKS PER WEEK   • Drug use: No   • Sexual activity: Not on file   Lifestyle   • Physical activity:     Days per week: Not on file     Minutes per session: Not on file   • Stress: Not on file   Relationships   • Social connections:     Talks on phone: Not on file     Gets together: Not on file     Attends [employer] service: Not on file     Active member of club or organization: Not on file     Attends meetings of clubs or organizations: Not on file     Relationship status: Not on file   • Intimate partner violence:     Fear of current or ex partner: Not on file     Emotionally abused: Not on file     Physically abused: Not on file     Forced sexual activity: Not on file   Other Topics Concern   • Not on file   Social History Narrative   • Not on file     Allergies   Allergen Reactions   • Tape      Blisters and scarring   • Statins [Hmg-Coa-R Inhibitors]        Current Outpatient Medications   Medication Sig Dispense Refill   • Misc Natural Products (BETA-SITOSTEROL PLANT STEROLS PO)      • apixaban (ELIQUIS) 5mg Tab Take 1 Tab by mouth 2 Times a Day. 180 Tab 3   • rivaroxaban (XARELTO)  "20 MG Tab tablet Take 1 Tab by mouth with dinner. 90 Tab 3   • dabigatran (PRADAXA) 150 MG Cap capsule Take 1 Cap by mouth 2 Times a Day. 180 Cap 3   • FORMOTEROL FUMARATE INH Inhale  by mouth.     • Omega-3 Fatty Acids (FISH OIL PO) Take  by mouth.     • Ferrous Sulfate (IRON) 325 (65 Fe) MG Tab Take  by mouth.     • amiodarone (CORDARONE) 200 MG Tab Take 1 Tab by mouth every day. 90 Tab 3   • levothyroxine (SYNTHROID) 88 MCG Tab Take 88 mcg by mouth Every morning on an empty stomach.     • Glucosamine-Chondroit-Vit C-Mn (GLUCOSAMINE 1500 COMPLEX) Cap Take  by mouth.     • losartan-hydrochlorothiazide (HYZAAR) 50-12.5 MG per tablet Take 1 Tab by mouth every day.     • Multiple Vitamin (MULTIVITAMIN PO) Take  by mouth every day.       No current facility-administered medications for this visit.        ROS  All others systems reviewed and negative.     Objective:     /74 (BP Location: Right arm, Patient Position: Sitting)   Pulse (!) 58   Ht 1.88 m (6' 2\")   Wt 86.2 kg (190 lb)   SpO2 92%  Body mass index is 24.39 kg/m².    Physical Exam   General: No acute distress. Well nourished.  HEENT: EOM grossly intact, no scleral icterus, no pharyngeal erythema.   Neck:  No JVD, no bruits, trachea midline  CVS: Slow, regular. Normal S1, S2. No M/R/G. No LE edema.  2+ radial pulses, 2+ PT pulses  Resp: CTAB. No wheezing or crackles/rhonchi. Normal respiratory effort.  Abdomen: Soft, NT, no catalino hepatomegaly.  MSK/Ext: No clubbing or cyanosis.  Skin: Warm and dry, no rashes.  Neurological: CN III-XII grossly intact. No focal deficits.   Psych: A&O x 3, appropriate affect, good judgement      Assessment:     1. Persistent atrial fibrillation     2. SOB (shortness of breath)     3. Bradycardia     4. Aortic ectasia, thoracic (HCC)     5. Mild persistent asthma without complication     6. Essential hypertension         Medical Decision Making:  Today's Assessment / Status / Plan:       -His [id number] is 2, discussed " blood thinner recommendation, no prior bleeding, need to stop ASA, see below  -Cont annual LFTS and TSH, occasional PFTS, gets blood work done with PCP  -Low threshold to reduce amiodarone to 100 mg  -I am not worried about asc aorta at 3.9 cm, not changed over time.  -RTC 6 months, try [place]      Written instructions given today:      Price check medications:  *Eliquis=Apixaban 5 mg AM and PM  *Xarelto=Rivaroxaban 20 mg once daily with food  *Pradaxa=Dabigatran 150 mg AM and PM    *Warfarin= starts 5 mg daily, needs blood testing    -If you decide on wafarin, I send you to our clinical pharmacist to get you started.    Return in about 6 months (around 7/22/2020).    It is my pleasure to participate in the care of Mr. Kruger.  Please do not hesitate to contact me with questions or concerns.    Blanca Pink MD, [facility]  Cardiologist [facility]    Please note that this dictation was created using voice recognition software. I have made every reasonable attempt to correct obvious errors, but it is possible there are errors of grammar and possibly content that I did not discover before finalizing the note.      Gerald RAGSDALE M.D.  [address] [facility]  [place] [address]  VIA Facsimile: [phone]

## 2020-01-22 NOTE — PROGRESS NOTES
Subjective:   Chief Complaint:   Chief Complaint   Patient presents with   • Atrial Fibrillation       Giovany Kruger is a 74 y.o. male who returns today for paroxysmal atrial fibrillation, intolerant to anticoagulation, under antiarrhythmic medication, aortic ectasia and hypertension.    Saw Dr. Hyun Mendiola about 10 years ago for afib.  He had noted palpitations for years.    Had CVA in 1980 after fall, broken neck.   Notes mild LLE drag and very mid left handed coordination. Not clear if they were related.    Has hypertension, blood pressure controlled on 2 meds.  Has mild hyperlipidemia, LDL cholesterol 114.    Remains on amiodarone for his A. fib.  Normal liver and TSH function April 2019.  Prior bruising but no serious bleeding.  Terms of aortic ectasia, echo in 2019 with a sending aorta 3.8 cm, CT scan and 2019 with a sending aorta 3.9 cm.    He is not limited by chest pain, pressure or tightness with activity.   No significant dyspnea on exertion, orthopnea or lower extremity swelling.   Prior GAY before, resolved spontaneously. PFTS normal 7-2019  No significant palpitations, lightheadedness, or presyncope/syncope.   No symptoms of leg claudication.   No stroke/TIA like symptoms.    No family history of premature coronary artery disease.  Former smoker, quit in his 20s.  No history of diabetes.  No history of autoimmune disease such as lupus or rheumatoid arthritis.  No chronic kidney disease.    He just moved from [place] to [place].  From [place], moved in 2006, liked the area.      DATA REVIEWED by me:  ECG 6/14/2019  Sinus, 83, first-degree AV delay, delayed R wave progression, left anterior fascicular block, nonspecific T wave changes    o 10-22-19  Sinus rhythm with variable rate response.  Noted nocturnal bradycardia - no concurrent diary entries.  No noted atrial fibrillation.    Echo 8/14/2019  Compared to the images of the prior study done on 01/24/18, no   significant  change.  Left ventricular ejection fraction is visually estimated to be 65%.  Mild tricuspid regurgitation.  Estimated right ventricular systolic pressure  is 30 mmHg.  Ascending aorta diameter is 3.8 cm.    Nuclear stress test 9/25/2019   normal perfusion, EF 58%    CT chest 8/14/2019  Ascending aorta 3.9 cm, 5 mm groundglass nodule.    PFTS: 7-17-19  Impression  Normal spirometry no response to bronchodilator.  Evidence of air trapping otherwise normal total lung capacity.  Normal gas transfer.  May be early signs of obstructive lung disease clinically correlate      Most recent labs:     4/20/2019 hemoglobin 13.4, MCV 97, platelets 354, sodium 136, testing 3.8, creatinine 1.44, GFR 48, LFTs normal, TSH 2.1, proBNP 290, , HDL 88, triglycerides 53, total cholesterol 218    Past Medical History:   Diagnosis Date   • Arrhythmia    • ASTHMA    • Atrial fibrillation (HCC)    • Back pain    • Chickenpox    • [place] measles    • Hypertension    • Peripheral vascular disease, unspecified (HCC)    • Stroke (HCC) 1980   • Tonsillitis    • Unspecified disorder of thyroid      Past Surgical History:   Procedure Laterality Date   • INGUINAL HERNIA REPAIR  9/5/2012    Performed by RIGOBERTO MCINTYRE at SURGERY [facility] ORS   • INGUINAL HERNIA REPAIR  11/3/2011    Performed by RIGOBERTO MCINTYRE at SURGERY [facility] ORS   • OTHER  2009    L3-4-5 susion   • OTHER  1980    C1-2-3 fusion   • TONSILLECTOMY  1952   • CARPAL TUNNEL RELEASE     • LAMINOTOMY      L 1,4,5 FUSION C1,2,3 FUSION     Family History   Problem Relation Age of Onset   • Cancer Other    • No Known Problems Mother    • No Known Problems Father    • Lung Disease Sister    • Cancer Brother      Social History     Socioeconomic History   • Marital status:      Spouse name: Not on file   • Number of children: Not on file   • Years of education: Not on file   • Highest education level: Not on file   Occupational History   • Not on file   Social Needs   •  Financial resource strain: Not on file   • Food insecurity:     Worry: Not on file     Inability: Not on file   • Transportation needs:     Medical: Not on file     Non-medical: Not on file   Tobacco Use   • Smoking status: Former Smoker     Packs/day: 1.00     Years: 20.00     Pack years: 20.00     Types: Cigarettes     Last attempt to quit: 1980     Years since quittin.0   • Smokeless tobacco: Never Used   Substance and Sexual Activity   • Alcohol use: Yes     Alcohol/week: 4.2 - 16.8 oz     Types: 7 Standard drinks or equivalent per week     Comment: 7 DRINKS PER WEEK   • Drug use: No   • Sexual activity: Not on file   Lifestyle   • Physical activity:     Days per week: Not on file     Minutes per session: Not on file   • Stress: Not on file   Relationships   • Social connections:     Talks on phone: Not on file     Gets together: Not on file     Attends Sabianist service: Not on file     Active member of club or organization: Not on file     Attends meetings of clubs or organizations: Not on file     Relationship status: Not on file   • Intimate partner violence:     Fear of current or ex partner: Not on file     Emotionally abused: Not on file     Physically abused: Not on file     Forced sexual activity: Not on file   Other Topics Concern   • Not on file   Social History Narrative   • Not on file     Allergies   Allergen Reactions   • Tape      Blisters and scarring   • Statins [Hmg-Coa-R Inhibitors]        Current Outpatient Medications   Medication Sig Dispense Refill   • Misc Natural Products (BETA-SITOSTEROL PLANT STEROLS PO)      • apixaban (ELIQUIS) 5mg Tab Take 1 Tab by mouth 2 Times a Day. 180 Tab 3   • rivaroxaban (XARELTO) 20 MG Tab tablet Take 1 Tab by mouth with dinner. 90 Tab 3   • dabigatran (PRADAXA) 150 MG Cap capsule Take 1 Cap by mouth 2 Times a Day. 180 Cap 3   • FORMOTEROL FUMARATE INH Inhale  by mouth.     • Omega-3 Fatty Acids (FISH OIL PO) Take  by mouth.     • Ferrous Sulfate  "(IRON) 325 (65 Fe) MG Tab Take  by mouth.     • amiodarone (CORDARONE) 200 MG Tab Take 1 Tab by mouth every day. 90 Tab 3   • levothyroxine (SYNTHROID) 88 MCG Tab Take 88 mcg by mouth Every morning on an empty stomach.     • Glucosamine-Chondroit-Vit C-Mn (GLUCOSAMINE 1500 COMPLEX) Cap Take  by mouth.     • losartan-hydrochlorothiazide (HYZAAR) 50-12.5 MG per tablet Take 1 Tab by mouth every day.     • Multiple Vitamin (MULTIVITAMIN PO) Take  by mouth every day.       No current facility-administered medications for this visit.        ROS  All others systems reviewed and negative.     Objective:     /74 (BP Location: Right arm, Patient Position: Sitting)   Pulse (!) 58   Ht 1.88 m (6' 2\")   Wt 86.2 kg (190 lb)   SpO2 92%  Body mass index is 24.39 kg/m².    Physical Exam   General: No acute distress. Well nourished.  HEENT: EOM grossly intact, no scleral icterus, no pharyngeal erythema.   Neck:  No JVD, no bruits, trachea midline  CVS: Slow, regular. Normal S1, S2. No M/R/G. No LE edema.  2+ radial pulses, 2+ PT pulses  Resp: CTAB. No wheezing or crackles/rhonchi. Normal respiratory effort.  Abdomen: Soft, NT, no catalino hepatomegaly.  MSK/Ext: No clubbing or cyanosis.  Skin: Warm and dry, no rashes.  Neurological: CN III-XII grossly intact. No focal deficits.   Psych: A&O x 3, appropriate affect, good judgement      Assessment:     1. Persistent atrial fibrillation     2. SOB (shortness of breath)     3. Bradycardia     4. Aortic ectasia, thoracic (HCC)     5. Mild persistent asthma without complication     6. Essential hypertension         Medical Decision Making:  Today's Assessment / Status / Plan:       -His [id number] is 2, discussed blood thinner recommendation, no prior bleeding, need to stop ASA, see below  -Cont annual LFTS and TSH, occasional PFTS, gets blood work done with PCP  -Low threshold to reduce amiodarone to 100 mg  -I am not worried about asc aorta at 3.9 cm, not changed over time.  -RTC " 6 months, try [place]      Written instructions given today:      Price check medications:  *Eliquis=Apixaban 5 mg AM and PM  *Xarelto=Rivaroxaban 20 mg once daily with food  *Pradaxa=Dabigatran 150 mg AM and PM    *Warfarin= starts 5 mg daily, needs blood testing    -If you decide on wafarin, I send you to our clinical pharmacist to get you started.    Return in about 6 months (around 7/22/2020).    It is my pleasure to participate in the care of Mr. Kruger.  Please do not hesitate to contact me with questions or concerns.    Blanca Pink MD, [facility]  Cardiologist [facility] for [facility]    Please note that this dictation was created using voice recognition software. I have made every reasonable attempt to correct obvious errors, but it is possible there are errors of grammar and possibly content that I did not discover before finalizing the note.

## 2020-01-23 ENCOUNTER — TELEPHONE (OUTPATIENT)
Dept: CARDIOLOGY | Facility: MEDICAL CENTER | Age: 75
End: 2020-01-23

## 2020-01-23 DIAGNOSIS — I48.91 ATRIAL FIBRILLATION, UNSPECIFIED TYPE (HCC): Primary | ICD-10-CM

## 2020-01-23 NOTE — TELEPHONE ENCOUNTER
LS    Patient is wanting to have the medications of *Eliquis=Apixaban 5 mg AM and PM  *Xarelto=Rivaroxaban 20 mg once daily with food  *Pradaxa=Dabigatran 150 mg AM and PM sent to the [facility] for a price check and he would like a call back at [phone].    Thank you,  Madelin NIETO

## 2020-01-23 NOTE — PATIENT INSTRUCTIONS
Price check medications:  *Eliquis=Apixaban 5 mg AM and PM  *Xarelto=Rivaroxaban 20 mg once daily with food  *Pradaxa=Dabigatran 150 mg AM and PM    *Warfarin= starts 5 mg daily, needs blood testing    -If you decide on wafarin, I send you to our clinical pharmacist to get you started.

## 2020-01-24 NOTE — TELEPHONE ENCOUNTER
Attempted to contact patient, no answer. LVM to call back.  Need to confirm which specific [facility] patient wants medications for price check sent to.

## 2020-02-24 DIAGNOSIS — J45.20 MILD INTERMITTENT ASTHMA WITHOUT COMPLICATION: ICD-10-CM

## 2020-02-24 RX ORDER — DILTIAZEM HYDROCHLORIDE 60 MG/1
2 TABLET, FILM COATED ORAL 2 TIMES DAILY
Qty: 3 INHALER | Refills: 3 | Status: SHIPPED
Start: 2020-02-24 | End: 2020-06-25 | Stop reason: SDUPTHER

## 2020-02-24 NOTE — TELEPHONE ENCOUNTER
Patient requesting RX for Symbicort to be faxed to [facility]: [phone]    Have we ever prescribed this med? Yes.  If yes, what date? 01/04/19 Asaf CASON    Last OV: 08/21/19     Next OV: 03/17/20     DX: Mild intermittent asthma without complication     Medications: budesonide-formoterol (SYMBICORT) 80-4.5 MCG/ACT Aerosol

## 2020-03-17 ENCOUNTER — APPOINTMENT (OUTPATIENT)
Dept: PULMONOLOGY | Facility: HOSPICE | Age: 75
End: 2020-03-17
Payer: MEDICARE

## 2020-06-23 ENCOUNTER — APPOINTMENT (OUTPATIENT)
Dept: PULMONOLOGY | Facility: HOSPICE | Age: 75
End: 2020-06-23
Payer: MEDICARE

## 2020-06-25 DIAGNOSIS — J45.20 MILD INTERMITTENT ASTHMA WITHOUT COMPLICATION: ICD-10-CM

## 2020-06-25 RX ORDER — BUDESONIDE AND FORMOTEROL FUMARATE DIHYDRATE 80; 4.5 UG/1; UG/1
2 AEROSOL RESPIRATORY (INHALATION) 2 TIMES DAILY
Qty: 3 INHALER | Refills: 3 | Status: SHIPPED | OUTPATIENT
Start: 2020-06-25 | End: 2021-01-21

## 2020-06-25 NOTE — TELEPHONE ENCOUNTER
Have we ever prescribed this med? Yes.  If yes, what date? 02/24/2020    Last OV: 09/04/2019 - Dr. Neville    Next OV: 8/11/2020 - Dr. Little    DX: Asthma    Medications: Symbicort

## 2020-07-22 NOTE — PROGRESS NOTES
Subjective:   Chief Complaint:   Chief Complaint   Patient presents with   • Atrial Fibrillation       Giovany Kruger is a 75 y.o. male who returns today for paroxysmal atrial fibrillation, chronic anticoagulation, antiarrhythmic medication, aortic ectasia and hypertension.    Saw Dr. Hyun Mendiola about 10 years ago for afib.  He had noted palpitations for years.    Had CVA in  after fall, broken neck.   Notes mild LLE drag and very mid left handed coordination. Not clear if they were related.    Has hypertension, blood pressure controlled on 2 meds.  Has mild hyperlipidemia, LDL cholesterol 114.    Remains on amiodarone for his A. fib.  Normal liver and TSH function 2020.  Has mild asthma.   Going to see Dr. Little now for pulmonary.  PFTs 19.    Prior bruising but no serious bleeding.  Gets skin tears and it bleeds for a day.    Terms of aortic ectasia, echo in  with acsending aorta 3.8 cm, CT scan and  with a sending aorta 3.9 cm.    Physically active. Not exercising like he used to.  He is not limited by chest pain, pressure or tightness with activity.   No significant dyspnea on exertion, orthopnea or lower extremity swelling.     Prior GAY before, resolved spontaneously. PFTS normal   No significant palpitations, lightheadedness, or presyncope/syncope.   No symptoms of leg claudication.   No stroke/TIA like symptoms.    No family history of premature coronary artery disease.  Former smoker, quit in his 20s.  No history of diabetes.  No history of autoimmune disease such as lupus or rheumatoid arthritis.  CKD 3, GFR 46.    He just moved from [place] to [place].  From [place], moved in , liked the area.    He brother  recently from cancer, sister from lung infection from birds.    DATA REVIEWED by me:  ECG 2019  Sinus, 83, first-degree AV delay, delayed R wave progression, left anterior fascicular block, nonspecific T wave changes    Zio 10-22-19  Sinus  rhythm with variable rate response.  Noted nocturnal bradycardia - no concurrent diary entries.  No noted atrial fibrillation.    Echo 8/14/2019  Compared to the images of the prior study done on 01/24/18, no   significant change.  Left ventricular ejection fraction is visually estimated to be 65%.  Mild tricuspid regurgitation.  Estimated right ventricular systolic pressure  is 30 mmHg.  Ascending aorta diameter is 3.8 cm.    Nuclear stress test 9/25/2019   normal perfusion, EF 58%    CT chest 8/14/2019  Ascending aorta 3.9 cm, 5 mm groundglass nodule.    PFTS: 7-17-19  Impression  Normal spirometry no response to bronchodilator.  Evidence of air trapping otherwise normal total lung capacity.  Normal gas transfer.  May be early signs of obstructive lung disease clinically correlate      Most recent labs:     Lab Results   Component Value Date/Time    HEMOGLOBIN 14.0 04/29/2020 10:42 AM    HEMATOCRIT 41.1 (L) 04/29/2020 10:42 AM    MCV 93.6 04/29/2020 10:42 AM    TSH 4.080 12/23/2016 08:24 AM      Lab Results   Component Value Date/Time    SODIUM 139 04/29/2020 10:42 AM    POTASSIUM 4.0 04/29/2020 10:42 AM    CHLORIDE 103 04/29/2020 10:42 AM    CO2 25 04/29/2020 10:42 AM    GLUCOSE 103 (H) 04/29/2020 10:42 AM    BUN 25 (H) 04/29/2020 10:42 AM    CREATININE 1.5 (H) 04/29/2020 10:42 AM      Lab Results   Component Value Date/Time    ASTSGOT 26 04/29/2020 10:42 AM    ALTSGPT 30 04/29/2020 10:42 AM    ALBUMIN 3.5 04/29/2020 10:42 AM      Lab Results   Component Value Date/Time    CHOLSTRLTOT 212 (H) 04/29/2020 10:42 AM    LDL 85 04/29/2020 10:42 AM    .0 (H) 04/29/2020 10:42 AM    TRIGLYCERIDE 95 04/29/2020 10:42 AM       4/20/2019 hemoglobin 13.4, MCV 97, platelets 354, sodium 136, testing 3.8, creatinine 1.44, GFR 48, LFTs normal, TSH 2.1, proBNP 290, , HDL 88, triglycerides 53, total cholesterol 218    Past Medical History:   Diagnosis Date   • Arrhythmia    • ASTHMA    • Atrial fibrillation (HCC)    •  Back pain    • Chickenpox    • Turkish measles    • Hypertension    • Peripheral vascular disease, unspecified (HCC)    • Stroke (HCC)    • Tonsillitis    • Unspecified disorder of thyroid      Past Surgical History:   Procedure Laterality Date   • INGUINAL HERNIA REPAIR  2012    Performed by RIGOBERTO MCINTYRE at SURGERY [facility] ORS   • INGUINAL HERNIA REPAIR  11/3/2011    Performed by RIGOBERTO MCINTYRE at SURGERY [facility] ORS   • OTHER      L3-4-5 susion   • OTHER      C1-2-3 fusion   • TONSILLECTOMY     • CARPAL TUNNEL RELEASE     • LAMINOTOMY      L 1,4,5 FUSION C1,2,3 FUSION     Family History   Problem Relation Age of Onset   • Cancer Other    • No Known Problems Mother    • No Known Problems Father    • Lung Disease Sister    • Cancer Brother      Social History     Socioeconomic History   • Marital status:      Spouse name: Not on file   • Number of children: Not on file   • Years of education: Not on file   • Highest education level: Not on file   Occupational History   • Not on file   Social Needs   • Financial resource strain: Not on file   • Food insecurity     Worry: Not on file     Inability: Not on file   • Transportation needs     Medical: Not on file     Non-medical: Not on file   Tobacco Use   • Smoking status: Former Smoker     Packs/day: 1.00     Years: 20.00     Pack years: 20.00     Types: Cigarettes     Last attempt to quit: 1980     Years since quittin.5   • Smokeless tobacco: Never Used   Substance and Sexual Activity   • Alcohol use: Yes     Alcohol/week: 4.2 - 16.8 oz     Types: 7 Standard drinks or equivalent per week     Comment: 7 DRINKS PER WEEK   • Drug use: No   • Sexual activity: Not on file   Lifestyle   • Physical activity     Days per week: Not on file     Minutes per session: Not on file   • Stress: Not on file   Relationships   • Social connections     Talks on phone: Not on file     Gets together: Not on file     Attends Quaker service:  "Not on file     Active member of club or organization: Not on file     Attends meetings of clubs or organizations: Not on file     Relationship status: Not on file   • Intimate partner violence     Fear of current or ex partner: Not on file     Emotionally abused: Not on file     Physically abused: Not on file     Forced sexual activity: Not on file   Other Topics Concern   • Not on file   Social History Narrative   • Not on file     Allergies   Allergen Reactions   • Tape      Blisters and scarring   • Statins [Hmg-Coa-R Inhibitors]        Current Outpatient Medications   Medication Sig Dispense Refill   • budesonide-formoterol (SYMBICORT) 80-4.5 MCG/ACT Aerosol Inhale 2 Puffs by mouth 2 Times a Day. Use spacer. Rinse mouth after each use. 3 Inhaler 3   • apixaban (ELIQUIS) 5mg Tab Take 1 Tab by mouth 2 Times a Day. 180 Tab 3   • FORMOTEROL FUMARATE INH Inhale  by mouth.     • Omega-3 Fatty Acids (FISH OIL PO) Take  by mouth.     • Ferrous Sulfate (IRON) 325 (65 Fe) MG Tab Take  by mouth.     • amiodarone (CORDARONE) 200 MG Tab Take 1 Tab by mouth every day. 90 Tab 3   • levothyroxine (SYNTHROID) 88 MCG Tab Take 88 mcg by mouth Every morning on an empty stomach.     • Glucosamine-Chondroit-Vit C-Mn (GLUCOSAMINE 1500 COMPLEX) Cap Take  by mouth.     • losartan-hydrochlorothiazide (HYZAAR) 50-12.5 MG per tablet Take 1 Tab by mouth every day.     • Multiple Vitamin (MULTIVITAMIN PO) Take  by mouth every day.       No current facility-administered medications for this visit.        ROS  All others systems reviewed and negative.     Objective:     /70 (BP Location: Right arm, Patient Position: Sitting)   Pulse (!) 56   Ht 1.88 m (6' 2\")   Wt 84.4 kg (186 lb)   SpO2 94%  Body mass index is 23.88 kg/m².    Physical Exam   General: No acute distress. Well nourished.  HEENT: EOM grossly intact, no scleral icterus, no pharyngeal erythema.   Neck:  No JVD, no bruits, trachea midline  CVS: Slow, regular. Normal S1, " S2. No M/R/G. No LE edema.  2+ radial pulses, 2+ PT pulses  Resp: CTAB. No wheezing or crackles/rhonchi. Normal respiratory effort.  Abdomen: Soft, NT, no catalino hepatomegaly.  MSK/Ext: No clubbing or cyanosis.  Skin: Warm and dry, no rashes.  Neurological: CN III-XII grossly intact. No focal deficits.   Psych: A&O x 3, appropriate affect, good judgement    Physical exam performed today and unchanged, except what is noted, compared to 1-    Assessment:     1. Paroxysmal atrial fibrillation (HCC)     2. Bradycardia     3. Mild persistent asthma without complication     4. Aortic ectasia, thoracic (HCC)     5. SOB (shortness of breath)     6. Essential hypertension     7. Renovascular hypertension     8. Stage 3 chronic kidney disease (HCC)     9. On amiodarone therapy         Medical Decision Making:  Today's Assessment / Status / Plan:       -His [id number] is 2, no prior TIA/CVA, very minor nuisance bleeding.  -Low threshold to reduce amiodarone to 100 mg, we discussed, he will stay 200 for now. Annual TSH and LFTs.  -Sees pulm, repeat PFTs for symptoms.  -I am not worried about asc aorta at 3.9 cm, not changed over time.  -Blood work with PCP  -RTC 6 months, try [place]    Return in about 6 months (around 1/23/2021).    It is my pleasure to participate in the care of Mr. Kruger.  Please do not hesitate to contact me with questions or concerns.    Blanca Pink MD, [facility]  Cardiologist [facility]    Please note that this dictation was created using voice recognition software. I have made every reasonable attempt to correct obvious errors, but it is possible there are errors of grammar and possibly content that I did not discover before finalizing the note.

## 2020-07-23 ENCOUNTER — OFFICE VISIT (OUTPATIENT)
Dept: CARDIOLOGY | Facility: CLINIC | Age: 75
End: 2020-07-23
Payer: MEDICARE

## 2020-07-23 VITALS
HEIGHT: 74 IN | SYSTOLIC BLOOD PRESSURE: 122 MMHG | WEIGHT: 186 LBS | OXYGEN SATURATION: 94 % | BODY MASS INDEX: 23.87 KG/M2 | HEART RATE: 56 BPM | DIASTOLIC BLOOD PRESSURE: 70 MMHG

## 2020-07-23 DIAGNOSIS — I77.810 AORTIC ECTASIA, THORACIC (HCC): ICD-10-CM

## 2020-07-23 DIAGNOSIS — I48.0 PAROXYSMAL ATRIAL FIBRILLATION (HCC): ICD-10-CM

## 2020-07-23 DIAGNOSIS — I10 ESSENTIAL HYPERTENSION: ICD-10-CM

## 2020-07-23 DIAGNOSIS — I15.0 RENOVASCULAR HYPERTENSION: ICD-10-CM

## 2020-07-23 DIAGNOSIS — R00.1 BRADYCARDIA: ICD-10-CM

## 2020-07-23 DIAGNOSIS — Z79.899 ON AMIODARONE THERAPY: ICD-10-CM

## 2020-07-23 DIAGNOSIS — J45.30 MILD PERSISTENT ASTHMA WITHOUT COMPLICATION: ICD-10-CM

## 2020-07-23 DIAGNOSIS — R06.02 SOB (SHORTNESS OF BREATH): ICD-10-CM

## 2020-07-23 DIAGNOSIS — N18.30 STAGE 3 CHRONIC KIDNEY DISEASE: ICD-10-CM

## 2020-07-23 PROCEDURE — 99214 OFFICE O/P EST MOD 30 MIN: CPT | Performed by: INTERNAL MEDICINE

## 2020-07-23 RX ORDER — DUTASTERIDE 0.5 MG/1
CAPSULE, LIQUID FILLED ORAL
COMMUNITY
Start: 2020-07-16 | End: 2020-07-23

## 2020-07-23 ASSESSMENT — FIBROSIS 4 INDEX: FIB4 SCORE: 1.16

## 2020-07-23 NOTE — LETTER
[facility] HealthK[facility] [address],   2nd [place] [address]  Phone: [phone]  Fax: [phone]              Giovany Kruger  1945    Encounter Date: 7/23/2020    Blanca Pink M.D.          PROGRESS NOTE:  Subjective:   Chief Complaint:   Chief Complaint   Patient presents with   • Atrial Fibrillation       Giovany Kruger is a 75 y.o. male who returns today for paroxysmal atrial fibrillation, chronic anticoagulation, antiarrhythmic medication, aortic ectasia and hypertension.    Saw Dr. Hyun Mendiola about 10 years ago for afib.  He had noted palpitations for years.    Had CVA in 1980 after fall, broken neck.   Notes mild LLE drag and very mid left handed coordination. Not clear if they were related.    Has hypertension, blood pressure controlled on 2 meds.  Has mild hyperlipidemia, LDL cholesterol 114.    Remains on amiodarone for his A. fib.  Normal liver and TSH function April 2020.  Has mild asthma.   Going to see Dr. Little now for pulmonary.  PFTs 7-17-19.    Prior bruising but no serious bleeding.  Gets skin tears and it bleeds for a day.    Terms of aortic ectasia, echo in 2019 with acsending aorta 3.8 cm, CT scan and 2019 with a sending aorta 3.9 cm.    Physically active. Not exercising like he used to.  He is not limited by chest pain, pressure or tightness with activity.   No significant dyspnea on exertion, orthopnea or lower extremity swelling.     Prior GAY before, resolved spontaneously. PFTS normal 7-2019  No significant palpitations, lightheadedness, or presyncope/syncope.   No symptoms of leg claudication.   No stroke/TIA like symptoms.    No family history of premature coronary artery disease.  Former smoker, quit in his 20s.  No history of diabetes.  No history of autoimmune disease such as lupus or rheumatoid arthritis.  CKD 3, GFR 46.    He just moved from [place] to [place].  From [place],  moved in , liked the area.    He brother  recently from cancer, sister from lung infection from birds.    DATA REVIEWED by me:  ECG 2019  Sinus, 83, first-degree AV delay, delayed R wave progression, left anterior fascicular block, nonspecific T wave changes    Zio 10-22-19  Sinus rhythm with variable rate response.  Noted nocturnal bradycardia - no concurrent diary entries.  No noted atrial fibrillation.    Echo 2019  Compared to the images of the prior study done on 18, no   significant change.  Left ventricular ejection fraction is visually estimated to be 65%.  Mild tricuspid regurgitation.  Estimated right ventricular systolic pressure  is 30 mmHg.  Ascending aorta diameter is 3.8 cm.    Nuclear stress test 2019   normal perfusion, EF 58%    CT chest 2019  Ascending aorta 3.9 cm, 5 mm groundglass nodule.    PFTS: 19  Impression  Normal spirometry no response to bronchodilator.  Evidence of air trapping otherwise normal total lung capacity.  Normal gas transfer.  May be early signs of obstructive lung disease clinically correlate      Most recent labs:     Lab Results   Component Value Date/Time    HEMOGLOBIN 14.0 2020 10:42 AM    HEMATOCRIT 41.1 (L) 2020 10:42 AM    MCV 93.6 2020 10:42 AM    TSH 4.080 2016 08:24 AM      Lab Results   Component Value Date/Time    SODIUM 139 2020 10:42 AM    POTASSIUM 4.0 2020 10:42 AM    CHLORIDE 103 2020 10:42 AM    CO2 25 2020 10:42 AM    GLUCOSE 103 (H) 2020 10:42 AM    BUN 25 (H) 2020 10:42 AM    CREATININE 1.5 (H) 2020 10:42 AM      Lab Results   Component Value Date/Time    ASTSGOT 26 2020 10:42 AM    ALTSGPT 30 2020 10:42 AM    ALBUMIN 3.5 2020 10:42 AM      Lab Results   Component Value Date/Time    CHOLSTRLTOT 212 (H) 2020 10:42 AM    LDL 85 2020 10:42 AM    .0 (H) 2020 10:42 AM    TRIGLYCERIDE 95 2020 10:42 AM          2019 hemoglobin 13.4, MCV 97, platelets 354, sodium 136, testing 3.8, creatinine 1.44, GFR 48, LFTs normal, TSH 2.1, proBNP 290, , HDL 88, triglycerides 53, total cholesterol 218    Past Medical History:   Diagnosis Date   • Arrhythmia    • ASTHMA    • Atrial fibrillation (HCC)    • Back pain    • Chickenpox    • Italian measles    • Hypertension    • Peripheral vascular disease, unspecified (HCC)    • Stroke (HCC)    • Tonsillitis    • Unspecified disorder of thyroid      Past Surgical History:   Procedure Laterality Date   • INGUINAL HERNIA REPAIR  2012    Performed by RIGOBERTO MCINTYRE at SURGERY [facility] ORS   • INGUINAL HERNIA REPAIR  11/3/2011    Performed by RIGOBERTO MCINTYRE at SURGERY [facility] ORS   • OTHER      L3-4-5 susion   • OTHER      C1-2-3 fusion   • TONSILLECTOMY     • CARPAL TUNNEL RELEASE     • LAMINOTOMY      L 1,4,5 FUSION C1,2,3 FUSION     Family History   Problem Relation Age of Onset   • Cancer Other    • No Known Problems Mother    • No Known Problems Father    • Lung Disease Sister    • Cancer Brother      Social History     Socioeconomic History   • Marital status:      Spouse name: Not on file   • Number of children: Not on file   • Years of education: Not on file   • Highest education level: Not on file   Occupational History   • Not on file   Social Needs   • Financial resource strain: Not on file   • Food insecurity     Worry: Not on file     Inability: Not on file   • Transportation needs     Medical: Not on file     Non-medical: Not on file   Tobacco Use   • Smoking status: Former Smoker     Packs/day: 1.00     Years: 20.00     Pack years: 20.00     Types: Cigarettes     Last attempt to quit: 1980     Years since quittin.5   • Smokeless tobacco: Never Used   Substance and Sexual Activity   • Alcohol use: Yes     Alcohol/week: 4.2 - 16.8 oz     Types: 7 Standard drinks or equivalent per week     Comment: 7 DRINKS PER WEEK   • Drug  use: No   • Sexual activity: Not on file   Lifestyle   • Physical activity     Days per week: Not on file     Minutes per session: Not on file   • Stress: Not on file   Relationships   • Social connections     Talks on phone: Not on file     Gets together: Not on file     Attends Nondenominational service: Not on file     Active member of club or organization: Not on file     Attends meetings of clubs or organizations: Not on file     Relationship status: Not on file   • Intimate partner violence     Fear of current or ex partner: Not on file     Emotionally abused: Not on file     Physically abused: Not on file     Forced sexual activity: Not on file   Other Topics Concern   • Not on file   Social History Narrative   • Not on file     Allergies   Allergen Reactions   • Tape      Blisters and scarring   • Statins [Hmg-Coa-R Inhibitors]        Current Outpatient Medications   Medication Sig Dispense Refill   • budesonide-formoterol (SYMBICORT) 80-4.5 MCG/ACT Aerosol Inhale 2 Puffs by mouth 2 Times a Day. Use spacer. Rinse mouth after each use. 3 Inhaler 3   • apixaban (ELIQUIS) 5mg Tab Take 1 Tab by mouth 2 Times a Day. 180 Tab 3   • FORMOTEROL FUMARATE INH Inhale  by mouth.     • Omega-3 Fatty Acids (FISH OIL PO) Take  by mouth.     • Ferrous Sulfate (IRON) 325 (65 Fe) MG Tab Take  by mouth.     • amiodarone (CORDARONE) 200 MG Tab Take 1 Tab by mouth every day. 90 Tab 3   • levothyroxine (SYNTHROID) 88 MCG Tab Take 88 mcg by mouth Every morning on an empty stomach.     • Glucosamine-Chondroit-Vit C-Mn (GLUCOSAMINE 1500 COMPLEX) Cap Take  by mouth.     • losartan-hydrochlorothiazide (HYZAAR) 50-12.5 MG per tablet Take 1 Tab by mouth every day.     • Multiple Vitamin (MULTIVITAMIN PO) Take  by mouth every day.       No current facility-administered medications for this visit.        ROS  All others systems reviewed and negative.     Objective:     /70 (BP Location: Right arm, Patient Position: Sitting)   Pulse (!) 56   " Ht 1.88 m (6' 2\")   Wt 84.4 kg (186 lb)   SpO2 94%  Body mass index is 23.88 kg/m².    Physical Exam   General: No acute distress. Well nourished.  HEENT: EOM grossly intact, no scleral icterus, no pharyngeal erythema.   Neck:  No JVD, no bruits, trachea midline  CVS: Slow, regular. Normal S1, S2. No M/R/G. No LE edema.  2+ radial pulses, 2+ PT pulses  Resp: CTAB. No wheezing or crackles/rhonchi. Normal respiratory effort.  Abdomen: Soft, NT, no catalino hepatomegaly.  MSK/Ext: No clubbing or cyanosis.  Skin: Warm and dry, no rashes.  Neurological: CN III-XII grossly intact. No focal deficits.   Psych: A&O x 3, appropriate affect, good judgement    Physical exam performed today and unchanged, except what is noted, compared to 1-    Assessment:     1. Paroxysmal atrial fibrillation (HCC)     2. Bradycardia     3. Mild persistent asthma without complication     4. Aortic ectasia, thoracic (HCC)     5. SOB (shortness of breath)     6. Essential hypertension     7. Renovascular hypertension     8. Stage 3 chronic kidney disease (HCC)     9. On amiodarone therapy         Medical Decision Making:  Today's Assessment / Status / Plan:       -His [id number] is 2, no prior TIA/CVA, very minor nuisance bleeding.  -Low threshold to reduce amiodarone to 100 mg, we discussed, he will stay 200 for now. Annual TSH and LFTs.  -Sees pulm, repeat PFTs for symptoms.  -I am not worried about asc aorta at 3.9 cm, not changed over time.  -Blood work with PCP  -RTC 6 months, try [place]    Return in about 6 months (around 1/23/2021).    It is my pleasure to participate in the care of Mr. Kruger.  Please do not hesitate to contact me with questions or concerns.    Blanca Pink MD, [facility]  Cardiologist [facility]    Please note that this dictation was created using voice recognition software. I have made every reasonable attempt to correct obvious errors, but it is possible there are errors " of grammar and possibly content that I did not discover before finalizing the note.      Gerald RAGSDALE M.D.  [address]  [place] [address]  VIA Facsimile: [phone]     Trena Little M.D.  [address]  [address]  [place] [address]  VIA In Basket

## 2020-08-11 ENCOUNTER — OFFICE VISIT (OUTPATIENT)
Dept: PULMONOLOGY | Facility: HOSPICE | Age: 75
End: 2020-08-11
Payer: MEDICARE

## 2020-08-11 VITALS
OXYGEN SATURATION: 98 % | TEMPERATURE: 97.9 F | WEIGHT: 187 LBS | SYSTOLIC BLOOD PRESSURE: 118 MMHG | HEIGHT: 74 IN | BODY MASS INDEX: 24 KG/M2 | HEART RATE: 58 BPM | DIASTOLIC BLOOD PRESSURE: 62 MMHG | RESPIRATION RATE: 16 BRPM

## 2020-08-11 DIAGNOSIS — Z79.899 ON AMIODARONE THERAPY: ICD-10-CM

## 2020-08-11 DIAGNOSIS — I48.91 ATRIAL FIBRILLATION, UNSPECIFIED TYPE (HCC): ICD-10-CM

## 2020-08-11 DIAGNOSIS — J45.30 MILD PERSISTENT ASTHMA WITHOUT COMPLICATION: ICD-10-CM

## 2020-08-11 PROCEDURE — 99214 OFFICE O/P EST MOD 30 MIN: CPT | Performed by: INTERNAL MEDICINE

## 2020-08-11 ASSESSMENT — FIBROSIS 4 INDEX: FIB4 SCORE: 1.16

## 2020-08-11 NOTE — PROGRESS NOTES
Chief Complaint   Patient presents with   • Follow-Up     SOB; Ashtma // Last Seen 19        HPI: This patient is a 75 y.o. male from [place] who returns for follow-up of asthma.  He was formerly seen by Dr. Neville.  He uses Symbicort 80 mcg twice daily.  He obtains prescriptions through [facility].  He denies cough, wheezing or exertional dyspnea.  He had undergone extensive work-up for worsening shortness of breath which was unrevealing, and ultimately resumed more intense aerobic exercise at the gym and has felt better.  He is now working out without any difficulty.  He is on chronic amiodarone therapy for atrial fibrillation.  He has not had pulmonary function testing or chest imaging in the past year.  Former PFT 2019 showed normal spirometry and gas transfer, FEV1 3.59 L or 90%.    Past Medical History:   Diagnosis Date   • Arrhythmia    • ASTHMA    • Atrial fibrillation (HCC)    • Back pain    • Chickenpox    • Ukrainian measles    • Hypertension    • Peripheral vascular disease, unspecified ([facility])    • Stroke ([facility])    • Tonsillitis    • Unspecified disorder of thyroid        Social History     Socioeconomic History   • Marital status:      Spouse name: Not on file   • Number of children: Not on file   • Years of education: Not on file   • Highest education level: Not on file   Occupational History   • Not on file   Social Needs   • Financial resource strain: Not on file   • Food insecurity     Worry: Not on file     Inability: Not on file   • Transportation needs     Medical: Not on file     Non-medical: Not on file   Tobacco Use   • Smoking status: Former Smoker     Packs/day: 1.00     Years: 20.00     Pack years: 20.00     Types: Cigarettes     Quit date: 1980     Years since quittin.6   • Smokeless tobacco: Never Used   Substance and Sexual Activity   • Alcohol use: Yes     Alcohol/week: 4.2 - 16.8 oz     Types: 7 Standard drinks or equivalent per week     Comment: 7 DRINKS PER  WEEK   • Drug use: No   • Sexual activity: Not on file   Lifestyle   • Physical activity     Days per week: Not on file     Minutes per session: Not on file   • Stress: Not on file   Relationships   • Social connections     Talks on phone: Not on file     Gets together: Not on file     Attends Restoration service: Not on file     Active member of club or organization: Not on file     Attends meetings of clubs or organizations: Not on file     Relationship status: Not on file   • Intimate partner violence     Fear of current or ex partner: Not on file     Emotionally abused: Not on file     Physically abused: Not on file     Forced sexual activity: Not on file   Other Topics Concern   • Not on file   Social History Narrative   • Not on file       Family History   Problem Relation Age of Onset   • Cancer Other    • No Known Problems Mother    • No Known Problems Father    • Lung Disease Sister    • Cancer Brother        Current Outpatient Medications on File Prior to Visit   Medication Sig Dispense Refill   • budesonide-formoterol (SYMBICORT) 80-4.5 MCG/ACT Aerosol Inhale 2 Puffs by mouth 2 Times a Day. Use spacer. Rinse mouth after each use. 3 Inhaler 3   • apixaban (ELIQUIS) 5mg Tab Take 1 Tab by mouth 2 Times a Day. 180 Tab 3   • FORMOTEROL FUMARATE INH Inhale  by mouth.     • Omega-3 Fatty Acids (FISH OIL PO) Take  by mouth.     • Ferrous Sulfate (IRON) 325 (65 Fe) MG Tab Take  by mouth.     • amiodarone (CORDARONE) 200 MG Tab Take 1 Tab by mouth every day. 90 Tab 3   • levothyroxine (SYNTHROID) 88 MCG Tab Take 88 mcg by mouth Every morning on an empty stomach.     • Glucosamine-Chondroit-Vit C-Mn (GLUCOSAMINE 1500 COMPLEX) Cap Take  by mouth.     • losartan-hydrochlorothiazide (HYZAAR) 50-12.5 MG per tablet Take 1 Tab by mouth every day.     • Multiple Vitamin (MULTIVITAMIN PO) Take  by mouth every day.       No current facility-administered medications on file prior to visit.        Allergies: Tape    ROS:  "  Constitutional: Denies fevers, chills, night sweats, fatigue or weight loss  Eyes: Denies vision loss, pain, drainage, double vision  Ears, Nose, Throat: Denies earache, difficulty hearing, tinnitus, nasal congestion, hoarseness  Cardiovascular: Denies chest pain, tightness, palpitations, orthopnea or edema  Respiratory: Denies shortness of breath, cough, wheezing, hemoptysis  Sleep: Denies daytime sleepiness, snoring, apneas, insomnia, morning headaches  GI: Denies heartburn, dysphagia, nausea, abdominal pain, diarrhea or constipation  : Denies frequent urination, hematuria, discharge or painful urination  Musculoskeletal: Denies back pain, painful joints, sore muscles  Neurological: Denies weakness or headaches  Skin: No rashes    /62 (BP Location: Left arm, Patient Position: Sitting, BP Cuff Size: Adult)   Pulse (!) 58   Temp 36.6 °C (97.9 °F) (Temporal)   Resp 16   Ht 1.88 m (6' 2\")   Wt 84.8 kg (187 lb)   SpO2 98%     Physical Exam:  Appearance: Well-nourished, well-developed, in no acute distress  HEENT: Normocephalic, atraumatic, white sclera, PERRLA  Neck: No adenopathy or masses  Respiratory: no intercostal retractions or accessory muscle use  Lungs auscultation: Clear to auscultation bilaterally  Cardiovascular: Regular rate rhythm. No murmurs, rubs or gallops.  No LE edema  Abdomen: soft, nondistended  Gait: Normal  Digits: No clubbing, cyanosis  Motor: No focal deficits  Orientation: Oriented to time, person and place    Diagnosis:  1. Mild persistent asthma without complication     2. On amiodarone therapy  PULMONARY FUNCTION TESTS -Test requested: Complete Pulmonary Function Test    DX-CHEST-2 VIEWS   3. Atrial fibrillation, unspecified type (HCC)         Plan:  Clinically, Fabio's asthma appears stable on Symbicort 80 mcg inhaler (generic equivalent obtained through [facility]).  Continue with treatment.  He is on chronic amiodarone and recommend chest x-ray as well as updated pulmonary " function testing for follow-up. Results can be discussed on MyChart as he prefers not to have to commute.  Return in about 6 months (around 2/11/2021).

## 2020-12-01 DIAGNOSIS — I48.91 ATRIAL FIBRILLATION, UNSPECIFIED TYPE (HCC): ICD-10-CM

## 2020-12-08 DIAGNOSIS — I48.91 ATRIAL FIBRILLATION, UNSPECIFIED TYPE (HCC): ICD-10-CM

## 2020-12-17 ENCOUNTER — NON-PROVIDER VISIT (OUTPATIENT)
Dept: SLEEP MEDICINE | Facility: MEDICAL CENTER | Age: 75
End: 2020-12-17
Payer: MEDICARE

## 2020-12-17 ENCOUNTER — OFFICE VISIT (OUTPATIENT)
Dept: SLEEP MEDICINE | Facility: MEDICAL CENTER | Age: 75
End: 2020-12-17
Payer: MEDICARE

## 2020-12-17 VITALS
RESPIRATION RATE: 16 BRPM | OXYGEN SATURATION: 97 % | HEIGHT: 74 IN | HEART RATE: 60 BPM | SYSTOLIC BLOOD PRESSURE: 134 MMHG | WEIGHT: 195 LBS | DIASTOLIC BLOOD PRESSURE: 64 MMHG | BODY MASS INDEX: 25.03 KG/M2

## 2020-12-17 VITALS — HEIGHT: 74 IN | WEIGHT: 195 LBS | BODY MASS INDEX: 25.03 KG/M2

## 2020-12-17 DIAGNOSIS — Z79.899 ON AMIODARONE THERAPY: ICD-10-CM

## 2020-12-17 DIAGNOSIS — J45.30 MILD PERSISTENT ASTHMA WITHOUT COMPLICATION: ICD-10-CM

## 2020-12-17 PROCEDURE — 99214 OFFICE O/P EST MOD 30 MIN: CPT | Mod: 25 | Performed by: INTERNAL MEDICINE

## 2020-12-17 PROCEDURE — 94726 PLETHYSMOGRAPHY LUNG VOLUMES: CPT | Performed by: INTERNAL MEDICINE

## 2020-12-17 PROCEDURE — 94060 EVALUATION OF WHEEZING: CPT | Performed by: INTERNAL MEDICINE

## 2020-12-17 PROCEDURE — 94729 DIFFUSING CAPACITY: CPT | Performed by: INTERNAL MEDICINE

## 2020-12-17 RX ORDER — A/SINGAPORE/GP1908/2015 IVR-180 (AN A/MICHIGAN/45/2015 (H1N1)PDM09-LIKE VIRUS, A/HONG KONG/4801/2014, NYMC X-263B (H3N2) (AN A/HONG KONG/4801/2014-LIKE VIRUS), AND B/BRISBANE/60/2008, WILD TYPE (A B/BRISBANE/60/2008-LIKE VIRUS) 15; 15; 15 UG/.5ML; UG/.5ML; UG/.5ML
INJECTION, SUSPENSION INTRAMUSCULAR
COMMUNITY
Start: 2020-10-01 | End: 2021-01-21

## 2020-12-17 RX ORDER — DUTASTERIDE 0.5 MG/1
CAPSULE, LIQUID FILLED ORAL
COMMUNITY
Start: 2020-10-12 | End: 2022-08-03

## 2020-12-17 ASSESSMENT — FIBROSIS 4 INDEX
FIB4 SCORE: 1.16
FIB4 SCORE: 1.16

## 2020-12-17 ASSESSMENT — PULMONARY FUNCTION TESTS
FEV1/FVC_PERCENT_PREDICTED: 74
FEV1/FVC_PREDICTED: 75
FEV1/FVC: 76.16
FVC: 4.32
FEV1_LLN: 2.90
FEV1_PREDICTED: 3.48
FEV1/FVC_PERCENT_PREDICTED: 102
FEV1/FVC: 74
FVC_PERCENT_PREDICTED: 91
FEV1/FVC_PERCENT_PREDICTED: 99
FEV1_PERCENT_CHANGE: 2
FEV1_PERCENT_CHANGE: 0
FEV1/FVC_PERCENT_LLN: 62
FEV1_PERCENT_PREDICTED: 91
FEV1/FVC_PERCENT_PREDICTED: 101
FEV1/FVC_PERCENT_CHANGE: 2
FVC_PREDICTED: 4.69
FVC_PERCENT_PREDICTED: 92
FEV1/FVC_PERCENT_PREDICTED: 100
FEV1: 3.29
FVC: 4.3
FEV1_PERCENT_PREDICTED: 94
FEV1: 3.2
FEV1/FVC: 74
FEV1/FVC: 76
FVC_LLN: 3.92

## 2020-12-17 NOTE — PROGRESS NOTES
Chief Complaint   Patient presents with   • Asthma     Last seen 20   • Results     PFT 20     HPI: This patient is a 75 y.o. male from [place] who returns for follow-up of asthma.  He uses Symbicort 80 mcg twice daily.  He obtains prescriptions through [facility], however, come  wants to fill prescriptions locally.  He denies cough, wheezing or exertional dyspnea.  Denies asthma exacerbations.  He is working out regularly, without difficulty. Denies ZENA use.    He is on chronic amiodarone therapy for atrial fibrillation.  Pulmonary function testing show normal lung function with FEV1 3.59 L or 90%.  Diffusion capacity is normal at 100%.  Chest x-ray shows no acute process.      Past Medical History:   Diagnosis Date   • Arrhythmia    • ASTHMA    • Atrial fibrillation (HCC)    • Back pain    • Chickenpox    • Swedish measles    • Hypertension    • Peripheral vascular disease, unspecified ([facility])    • Stroke ([facility])    • Tonsillitis    • Unspecified disorder of thyroid        Social History     Socioeconomic History   • Marital status:      Spouse name: Not on file   • Number of children: Not on file   • Years of education: Not on file   • Highest education level: Not on file   Occupational History   • Not on file   Social Needs   • Financial resource strain: Not on file   • Food insecurity     Worry: Not on file     Inability: Not on file   • Transportation needs     Medical: Not on file     Non-medical: Not on file   Tobacco Use   • Smoking status: Former Smoker     Packs/day: 1.00     Years: 20.00     Pack years: 20.00     Types: Cigarettes     Quit date: 1980     Years since quittin.9   • Smokeless tobacco: Never Used   Substance and Sexual Activity   • Alcohol use: Yes     Alcohol/week: 4.2 - 16.8 oz     Types: 7 Standard drinks or equivalent per week     Comment: 7 DRINKS PER WEEK   • Drug use: No   • Sexual activity: Not on file   Lifestyle   • Physical activity     Days per  week: Not on file     Minutes per session: Not on file   • Stress: Not on file   Relationships   • Social connections     Talks on phone: Not on file     Gets together: Not on file     Attends Buddhism service: Not on file     Active member of club or organization: Not on file     Attends meetings of clubs or organizations: Not on file     Relationship status: Not on file   • Intimate partner violence     Fear of current or ex partner: Not on file     Emotionally abused: Not on file     Physically abused: Not on file     Forced sexual activity: Not on file   Other Topics Concern   • Not on file   Social History Narrative   • Not on file       Family History   Problem Relation Age of Onset   • Cancer Other    • No Known Problems Mother    • No Known Problems Father    • Lung Disease Sister    • Cancer Brother        Current Outpatient Medications on File Prior to Visit   Medication Sig Dispense Refill   • apixaban (ELIQUIS) 5mg Tab Take 1 Tab by mouth 2 Times a Day. 180 Tab 2   • FORMOTEROL FUMARATE INH Inhale  by mouth.     • Omega-3 Fatty Acids (FISH OIL PO) Take  by mouth.     • Ferrous Sulfate (IRON) 325 (65 Fe) MG Tab Take  by mouth.     • amiodarone (CORDARONE) 200 MG Tab Take 1 Tab by mouth every day. 90 Tab 3   • levothyroxine (SYNTHROID) 88 MCG Tab Take 88 mcg by mouth Every morning on an empty stomach.     • Glucosamine-Chondroit-Vit C-Mn (GLUCOSAMINE 1500 COMPLEX) Cap Take  by mouth.     • losartan-hydrochlorothiazide (HYZAAR) 50-12.5 MG per tablet Take 1 Tab by mouth every day.     • Multiple Vitamin (MULTIVITAMIN PO) Take  by mouth every day.     • dutasteride (AVODART) 0.5 MG capsule      • FLUAD QUADRIVALENT 0.5 ML Prefilled Syringe PHARMACY ADMINISTERED     • budesonide-formoterol (SYMBICORT) 80-4.5 MCG/ACT Aerosol Inhale 2 Puffs by mouth 2 Times a Day. Use spacer. Rinse mouth after each use. (Patient not taking: Reported on 12/17/2020) 3 Inhaler 3     No current facility-administered medications on  "file prior to visit.        Allergies: Tape    ROS:   Constitutional: Denies fevers, chills, night sweats, fatigue or weight loss  Eyes: Denies vision loss, pain, drainage, double vision  Ears, Nose, Throat: Denies earache, difficulty hearing, tinnitus, nasal congestion, hoarseness  Cardiovascular: Denies chest pain, tightness, palpitations, orthopnea or edema  Respiratory: Denies shortness of breath, cough, wheezing, hemoptysis  Sleep: Denies daytime sleepiness, snoring, apneas, insomnia, morning headaches  GI: Denies heartburn, dysphagia, nausea, abdominal pain, diarrhea or constipation  : Denies frequent urination, hematuria, discharge or painful urination  Musculoskeletal: Denies back pain, painful joints, sore muscles  Neurological: Denies weakness or headaches  Skin: No rashes    /64 (BP Location: Right arm, Patient Position: Sitting, BP Cuff Size: Adult)   Pulse 60   Resp 16   Ht 1.88 m (6' 2\")   Wt 88.5 kg (195 lb)   SpO2 97%     Physical Exam:  Appearance: Well-nourished, well-developed, in no acute distress  HEENT: Normocephalic, atraumatic, white sclera, PERRLA, masked  Neck: No adenopathy or masses  Respiratory: no intercostal retractions or accessory muscle use  Lungs auscultation: Clear to auscultation bilaterally  Cardiovascular: Regular rate rhythm. No murmurs, rubs or gallops.  No LE edema  Abdomen: soft, nondistended  Gait: Normal  Digits: No clubbing, cyanosis  Motor: No focal deficits  Orientation: Oriented to time, person and place    Diagnosis:  1. Mild persistent asthma without complication     2. On amiodarone therapy         Plan:  The patient's asthma is stable on Symbicort 80 mcg twice daily.  Continue with treatment.    Use albuterol HFA 1 to 2 puffs every 4 hours as needed.  COVID precautions encouraged.  Pulmonary function and chest x-ray are normal, with no evidence of pulmonary amiodarone toxicity.  Return in about 6 months (around 6/17/2021).      "

## 2020-12-17 NOTE — PROCEDURES
Good patient effort & cooperation. The results of this test meet the ATS/ERS standards for acceptability & reproducibility. Test was performed on the GrouPAY Body Plethysmograph-Elite DX system. Predicted values were 2017 for DLCO, ITS for Lung Volumes. The DLCO was uncorrected for Hgb. A bronchodilator of Ventolin HFA - 2 puffs via spacer administered. DLCO performed during dilation period.    FVC is 4.32 L or 92%, FEV1 is 3.29 L 94%, FEV1/FVC: 76%.  T%.  DLCO: 100%.  No bronchodilator response.    Interpretation:   Normal lung function and gas transfer.

## 2021-01-21 ENCOUNTER — OFFICE VISIT (OUTPATIENT)
Dept: CARDIOLOGY | Facility: CLINIC | Age: 76
End: 2021-01-21
Payer: MEDICARE

## 2021-01-21 VITALS
SYSTOLIC BLOOD PRESSURE: 130 MMHG | HEART RATE: 61 BPM | WEIGHT: 197 LBS | HEIGHT: 74 IN | BODY MASS INDEX: 25.28 KG/M2 | OXYGEN SATURATION: 97 % | DIASTOLIC BLOOD PRESSURE: 82 MMHG

## 2021-01-21 DIAGNOSIS — I48.91 ATRIAL FIBRILLATION, UNSPECIFIED TYPE (HCC): ICD-10-CM

## 2021-01-21 DIAGNOSIS — E78.00 PURE HYPERCHOLESTEROLEMIA: ICD-10-CM

## 2021-01-21 DIAGNOSIS — Z79.899 ON AMIODARONE THERAPY: ICD-10-CM

## 2021-01-21 DIAGNOSIS — I10 ESSENTIAL HYPERTENSION: ICD-10-CM

## 2021-01-21 DIAGNOSIS — R06.02 SOB (SHORTNESS OF BREATH): ICD-10-CM

## 2021-01-21 DIAGNOSIS — I77.810 AORTIC ECTASIA, THORACIC (HCC): ICD-10-CM

## 2021-01-21 DIAGNOSIS — I48.0 PAROXYSMAL ATRIAL FIBRILLATION (HCC): ICD-10-CM

## 2021-01-21 DIAGNOSIS — I15.0 RENOVASCULAR HYPERTENSION: ICD-10-CM

## 2021-01-21 DIAGNOSIS — R00.1 BRADYCARDIA: ICD-10-CM

## 2021-01-21 DIAGNOSIS — J45.30 MILD PERSISTENT ASTHMA WITHOUT COMPLICATION: ICD-10-CM

## 2021-01-21 DIAGNOSIS — N18.31 STAGE 3A CHRONIC KIDNEY DISEASE: ICD-10-CM

## 2021-01-21 PROCEDURE — 99214 OFFICE O/P EST MOD 30 MIN: CPT | Performed by: INTERNAL MEDICINE

## 2021-01-21 ASSESSMENT — FIBROSIS 4 INDEX: FIB4 SCORE: 1.16

## 2021-01-21 NOTE — LETTER
[facility] [address],   [address]  [place] [address]  Phone: [phone]  Fax: [phone]              Giovany Kruger  1945    Encounter Date: 1/21/2021    Blanca Pink M.D.          PROGRESS NOTE:  Subjective:   Chief Complaint:   Chief Complaint   Patient presents with   • Atrial Fibrillation     6mo f/v dx:paf       Giovany Kruger is a 75 y.o. male who returns today for paroxysmal atrial fibrillation, prior CVA (not from afib), chronic anticoagulation, antiarrhythmic medication, aortic ectasia and hypertension, mild asthma    Saw Dr. Hyun Mendiola about 10 years ago for afib.  He had noted palpitations for years but no more.  Has PAF.   Remains on amiodarone for his A. fib.  Normal liver and TSH function April 2020.  Has mild asthma, sees Dr. Little now for pulmonary.  PFTs 7-17-19 and 2020, looked good.    Had CVA in 1980 after fall, broken neck.   Notes mild LLE drag and very mid left handed coordination. Not clear if they were related.  Not felt to be from afib.    On apixaban, [id number] 4 with prior CVA but CVA was not afib.  Prior bruising but no serious bleeding.  Gets skin tears and it bleeds for a day.    Has hypertension, blood pressure controlled on 2 meds.    Has mild hyperlipidemia, LDL cholesterol 114 -> 85.    CKD3a.    Terms of aortic ectasia, echo in 2019 with acsending aorta 3.8 cm, CT scan and 2019 with a sending aorta 3.9 cm.    Physically active. Likes to exercise. Has the old fashioned [employer] bike.  He is not limited by chest pain, pressure or tightness with activity.   No significant dyspnea on exertion, orthopnea or lower extremity swelling.     Prior GAY before, resolved spontaneously. PFTS normal 7-2019  No significant palpitations, lightheadedness, or presyncope/syncope.   No symptoms of leg claudication.   No stroke/TIA like symptoms.    No family history of premature coronary  artery disease.  Former smoker, quit in his 20s.  No history of diabetes.  No history of autoimmune disease such as lupus or rheumatoid arthritis.  CKD 3, GFR 46.    He just moved from [place] to [place].  From [place], moved in , liked the area.    He brother  recently from cancer, sister from lung infection from birds.    DATA REVIEWED by me:  ECG 2019  Sinus, 83, first-degree AV delay, delayed R wave progression, left anterior fascicular block, nonspecific T wave changes    Zio 10-22-19  Sinus rhythm with variable rate response.  Noted nocturnal bradycardia - no concurrent diary entries.  No noted atrial fibrillation.    Echo 2019  Compared to the images of the prior study done on 18, no   significant change.  Left ventricular ejection fraction is visually estimated to be 65%.  Mild tricuspid regurgitation.  Estimated right ventricular systolic pressure  is 30 mmHg.  Ascending aorta diameter is 3.8 cm.    Nuclear stress test 2019   normal perfusion, EF 58%    CT chest 2019  Ascending aorta 3.9 cm, 5 mm groundglass nodule.    PFTs 2020  Interpretation:   Normal lung function and gas transfer.    PFTS: 19  Impression  Normal spirometry no response to bronchodilator.  Evidence of air trapping otherwise normal total lung capacity.  Normal gas transfer.  May be early signs of obstructive lung disease clinically correlate      Most recent labs:     Lab Results   Component Value Date/Time    HEMOGLOBIN 14.0 2020 10:42 AM    HEMATOCRIT 41.1 (L) 2020 10:42 AM    MCV 93.6 2020 10:42 AM    TSH 4.080 2016 08:24 AM      Lab Results   Component Value Date/Time    SODIUM 139 2020 10:42 AM    POTASSIUM 4.0 2020 10:42 AM    CHLORIDE 103 2020 10:42 AM    CO2 25 2020 10:42 AM    GLUCOSE 103 (H) 2020 10:42 AM    BUN 25 (H) 2020 10:42 AM    CREATININE 1.5 (H) 2020 10:42 AM      Lab Results   Component Value Date/Time     ASTSGOT 26 04/29/2020 10:42 AM    ALTSGPT 30 04/29/2020 10:42 AM    ALBUMIN 3.5 04/29/2020 10:42 AM      Lab Results   Component Value Date/Time    CHOLSTRLTOT 212 (H) 04/29/2020 10:42 AM    LDL 85 04/29/2020 10:42 AM    .0 (H) 04/29/2020 10:42 AM    TRIGLYCERIDE 95 04/29/2020 10:42 AM       4/20/2019 hemoglobin 13.4, MCV 97, platelets 354, sodium 136, testing 3.8, creatinine 1.44, GFR 48, LFTs normal, TSH 2.1, proBNP 290, , HDL 88, triglycerides 53, total cholesterol 218    Past Medical History:   Diagnosis Date   • Arrhythmia    • ASTHMA    • Atrial fibrillation (HCC)    • Back pain    • Chickenpox    • Congolese measles    • Hypertension    • Peripheral vascular disease, unspecified (HCC)    • Stroke (HCC) 1980   • Tonsillitis    • Unspecified disorder of thyroid      Past Surgical History:   Procedure Laterality Date   • INGUINAL HERNIA REPAIR  9/5/2012    Performed by RIGOBERTO MCINTYRE at SURGERY [facility] ORS   • INGUINAL HERNIA REPAIR  11/3/2011    Performed by RIGOBERTO MCINTYRE at SURGERY [facility] ORS   • OTHER  2009    L3-4-5 susion   • OTHER  1980    C1-2-3 fusion   • TONSILLECTOMY  1952   • CARPAL TUNNEL RELEASE     • LAMINOTOMY      L 1,4,5 FUSION C1,2,3 FUSION     Family History   Problem Relation Age of Onset   • Cancer Other    • No Known Problems Mother    • No Known Problems Father    • Lung Disease Sister    • Cancer Brother      Social History     Socioeconomic History   • Marital status:      Spouse name: Not on file   • Number of children: Not on file   • Years of education: Not on file   • Highest education level: Not on file   Occupational History   • Not on file   Social Needs   • Financial resource strain: Not on file   • Food insecurity     Worry: Not on file     Inability: Not on file   • Transportation needs     Medical: Not on file     Non-medical: Not on file   Tobacco Use   • Smoking status: Former Smoker     Packs/day: 1.00     Years: 20.00     Pack years: 20.00      Types: Cigarettes     Quit date: 1980     Years since quittin.0   • Smokeless tobacco: Never Used   Substance and Sexual Activity   • Alcohol use: Yes     Alcohol/week: 4.2 - 16.8 oz     Types: 7 Standard drinks or equivalent per week     Comment: 7 DRINKS PER WEEK   • Drug use: No   • Sexual activity: Not on file   Lifestyle   • Physical activity     Days per week: Not on file     Minutes per session: Not on file   • Stress: Not on file   Relationships   • Social connections     Talks on phone: Not on file     Gets together: Not on file     Attends Hoahaoism service: Not on file     Active member of club or organization: Not on file     Attends meetings of clubs or organizations: Not on file     Relationship status: Not on file   • Intimate partner violence     Fear of current or ex partner: Not on file     Emotionally abused: Not on file     Physically abused: Not on file     Forced sexual activity: Not on file   Other Topics Concern   • Not on file   Social History Narrative   • Not on file     Allergies   Allergen Reactions   • Tape      Blisters and scarring       Current Outpatient Medications   Medication Sig Dispense Refill   • apixaban (ELIQUIS) 5mg Tab Take 1 Tab by mouth 2 Times a Day. 180 Tab 7   • dutasteride (AVODART) 0.5 MG capsule      • FORMOTEROL FUMARATE INH Inhale  by mouth.     • Omega-3 Fatty Acids (FISH OIL PO) Take  by mouth.     • Ferrous Sulfate (IRON) 325 (65 Fe) MG Tab Take  by mouth.     • amiodarone (CORDARONE) 200 MG Tab Take 1 Tab by mouth every day. 90 Tab 3   • levothyroxine (SYNTHROID) 88 MCG Tab Take 88 mcg by mouth Every morning on an empty stomach.     • Glucosamine-Chondroit-Vit C-Mn (GLUCOSAMINE 1500 COMPLEX) Cap Take  by mouth.     • losartan-hydrochlorothiazide (HYZAAR) 50-12.5 MG per tablet Take 1 Tab by mouth every day.     • Multiple Vitamin (MULTIVITAMIN PO) Take  by mouth every day.       No current facility-administered medications for this visit.   "      ROS    All others systems reviewed and negative.     Objective:     /82 (BP Location: Left arm, Patient Position: Sitting, BP Cuff Size: Adult)   Pulse 61   Ht 1.88 m (6' 2\")   Wt 89.4 kg (197 lb)   SpO2 97%  Body mass index is 25.29 kg/m².    Physical Exam   General: No acute distress. Well nourished.  HEENT: EOM grossly intact, no scleral icterus, no pharyngeal erythema.   Neck:  No JVD, no bruits, trachea midline  CVS: Slow, regular. Normal S1, S2. No M/R/G. No LE edema.  2+ radial pulses, 2+ PT pulses  Resp: CTAB. No wheezing or crackles/rhonchi. Normal respiratory effort.  Abdomen: Soft, NT, no catalino hepatomegaly.  MSK/Ext: No clubbing or cyanosis.  Skin: Warm and dry, no rashes.  Neurological: CN III-XII grossly intact. No focal deficits.   Psych: A&O x 3, appropriate affect, good judgement    Physical exam performed today and unchanged, except what is noted, compared to 7-    Assessment:     1. Paroxysmal atrial fibrillation (HCC)     2. Bradycardia     3. Mild persistent asthma without complication     4. Aortic ectasia, thoracic (HCC)     5. SOB (shortness of breath)     6. Essential hypertension     7. Renovascular hypertension     8. Stage 3a chronic kidney disease     9. On amiodarone therapy  Comp Metabolic Panel    TSH WITH REFLEX TO FT4   10. Atrial fibrillation, unspecified type (HCC)  apixaban (ELIQUIS) 5mg Tab   11. Pure hypercholesterolemia  Lipid Profile       Medical Decision Making:  Today's Assessment / Status / Plan:       -His [id number] is 4 with prior CVA but the CVA was not related to afib, very minor nuisance bleeding.  -Low threshold to reduce amiodarone to 100 mg, we discussed, he will stay 200 for now.   -For amiodarone surveillance, will need TSH, liver function every 6 months, close FU of respiratory symptoms, may need PFTs. We discussed today. Due April 2021  -Sees pulm, repeat PFTs looked good, I don't know why this would be denied given asthma and " amiodarone.  -If we ever needed to stop amiodarone, consider multaq or EP consultation  -I am not worried about asc aorta at 3.9 cm, not changed over time.  -Blood work with PCP  -LDL 85, HDL is excellent  -RTC 9 months, try [place]    Return in about 9 months (around 10/21/2021).    It is my pleasure to participate in the care of Mr. Kruger.  Please do not hesitate to contact me with questions or concerns.    Blanca Pink MD, [facility]  Cardiologist [facility]    Please note that this dictation was created using voice recognition software. I have made every reasonable attempt to correct obvious errors, but it is possible there are errors of grammar and possibly content that I did not discover before finalizing the note.        Gerald RAGSDALE M.D.  [id number] [facility]  [place] [address]  Via Fax: [phone]

## 2021-01-21 NOTE — PROGRESS NOTES
Subjective:   Chief Complaint:   Chief Complaint   Patient presents with   • Atrial Fibrillation     6mo f/v dx:paf       Giovany Kruger is a 75 y.o. male who returns today for paroxysmal atrial fibrillation, prior CVA (not from afib), chronic anticoagulation, antiarrhythmic medication, aortic ectasia and hypertension, mild asthma    Saw Dr. Hyun Mendiola about 10 years ago for afib.  He had noted palpitations for years but no more.  Has PAF.   Remains on amiodarone for his A. fib.  Normal liver and TSH function 2020.  Has mild asthma, sees Dr. Little now for pulmonary.  PFTs 19 and , looked good.    Had CVA in  after fall, broken neck.   Notes mild LLE drag and very mid left handed coordination. Not clear if they were related.  Not felt to be from afib.    On apixaban, [id number] 4 with prior CVA but CVA was not afib.  Prior bruising but no serious bleeding.  Gets skin tears and it bleeds for a day.    Has hypertension, blood pressure controlled on 2 meds.    Has mild hyperlipidemia, LDL cholesterol 114 -> 85.    CKD3a.    Terms of aortic ectasia, echo in  with acsending aorta 3.8 cm, CT scan and  with a sending aorta 3.9 cm.    Physically active. Likes to exercise. Has the old fashioned [employer] bike.  He is not limited by chest pain, pressure or tightness with activity.   No significant dyspnea on exertion, orthopnea or lower extremity swelling.     Prior GAY before, resolved spontaneously. PFTS normal   No significant palpitations, lightheadedness, or presyncope/syncope.   No symptoms of leg claudication.   No stroke/TIA like symptoms.    No family history of premature coronary artery disease.  Former smoker, quit in his 20s.  No history of diabetes.  No history of autoimmune disease such as lupus or rheumatoid arthritis.  CKD 3, GFR 46.    He just moved from [place] to [place].  From [place], moved in , liked the area.    He brother  recently from cancer,  sister from lung infection from birds.    DATA REVIEWED by me:  ECG 6/14/2019  Sinus, 83, first-degree AV delay, delayed R wave progression, left anterior fascicular block, nonspecific T wave changes    Zio 10-22-19  Sinus rhythm with variable rate response.  Noted nocturnal bradycardia - no concurrent diary entries.  No noted atrial fibrillation.    Echo 8/14/2019  Compared to the images of the prior study done on 01/24/18, no   significant change.  Left ventricular ejection fraction is visually estimated to be 65%.  Mild tricuspid regurgitation.  Estimated right ventricular systolic pressure  is 30 mmHg.  Ascending aorta diameter is 3.8 cm.    Nuclear stress test 9/25/2019   normal perfusion, EF 58%    CT chest 8/14/2019  Ascending aorta 3.9 cm, 5 mm groundglass nodule.    PFTs 12-  Interpretation:   Normal lung function and gas transfer.    PFTS: 7-17-19  Impression  Normal spirometry no response to bronchodilator.  Evidence of air trapping otherwise normal total lung capacity.  Normal gas transfer.  May be early signs of obstructive lung disease clinically correlate      Most recent labs:     Lab Results   Component Value Date/Time    HEMOGLOBIN 14.0 04/29/2020 10:42 AM    HEMATOCRIT 41.1 (L) 04/29/2020 10:42 AM    MCV 93.6 04/29/2020 10:42 AM    TSH 4.080 12/23/2016 08:24 AM      Lab Results   Component Value Date/Time    SODIUM 139 04/29/2020 10:42 AM    POTASSIUM 4.0 04/29/2020 10:42 AM    CHLORIDE 103 04/29/2020 10:42 AM    CO2 25 04/29/2020 10:42 AM    GLUCOSE 103 (H) 04/29/2020 10:42 AM    BUN 25 (H) 04/29/2020 10:42 AM    CREATININE 1.5 (H) 04/29/2020 10:42 AM      Lab Results   Component Value Date/Time    ASTSGOT 26 04/29/2020 10:42 AM    ALTSGPT 30 04/29/2020 10:42 AM    ALBUMIN 3.5 04/29/2020 10:42 AM      Lab Results   Component Value Date/Time    CHOLSTRLTOT 212 (H) 04/29/2020 10:42 AM    LDL 85 04/29/2020 10:42 AM    .0 (H) 04/29/2020 10:42 AM    TRIGLYCERIDE 95 04/29/2020 10:42 AM        2019 hemoglobin 13.4, MCV 97, platelets 354, sodium 136, testing 3.8, creatinine 1.44, GFR 48, LFTs normal, TSH 2.1, proBNP 290, , HDL 88, triglycerides 53, total cholesterol 218    Past Medical History:   Diagnosis Date   • Arrhythmia    • ASTHMA    • Atrial fibrillation (HCC)    • Back pain    • Chickenpox    • Urdu measles    • Hypertension    • Peripheral vascular disease, unspecified (HCC)    • Stroke (HCC)    • Tonsillitis    • Unspecified disorder of thyroid      Past Surgical History:   Procedure Laterality Date   • INGUINAL HERNIA REPAIR  2012    Performed by RIGOBERTO MCINTYRE at SURGERY [facility] ORS   • INGUINAL HERNIA REPAIR  11/3/2011    Performed by RIGOBERTO MCINTYRE at SURGERY [facility] ORS   • OTHER      L3-4-5 susion   • OTHER      C1-2-3 fusion   • TONSILLECTOMY     • CARPAL TUNNEL RELEASE     • LAMINOTOMY      L 1,4,5 FUSION C1,2,3 FUSION     Family History   Problem Relation Age of Onset   • Cancer Other    • No Known Problems Mother    • No Known Problems Father    • Lung Disease Sister    • Cancer Brother      Social History     Socioeconomic History   • Marital status:      Spouse name: Not on file   • Number of children: Not on file   • Years of education: Not on file   • Highest education level: Not on file   Occupational History   • Not on file   Social Needs   • Financial resource strain: Not on file   • Food insecurity     Worry: Not on file     Inability: Not on file   • Transportation needs     Medical: Not on file     Non-medical: Not on file   Tobacco Use   • Smoking status: Former Smoker     Packs/day: 1.00     Years: 20.00     Pack years: 20.00     Types: Cigarettes     Quit date: 1980     Years since quittin.0   • Smokeless tobacco: Never Used   Substance and Sexual Activity   • Alcohol use: Yes     Alcohol/week: 4.2 - 16.8 oz     Types: 7 Standard drinks or equivalent per week     Comment: 7 DRINKS PER WEEK   • Drug use: No   •  "Sexual activity: Not on file   Lifestyle   • Physical activity     Days per week: Not on file     Minutes per session: Not on file   • Stress: Not on file   Relationships   • Social connections     Talks on phone: Not on file     Gets together: Not on file     Attends Synagogue service: Not on file     Active member of club or organization: Not on file     Attends meetings of clubs or organizations: Not on file     Relationship status: Not on file   • Intimate partner violence     Fear of current or ex partner: Not on file     Emotionally abused: Not on file     Physically abused: Not on file     Forced sexual activity: Not on file   Other Topics Concern   • Not on file   Social History Narrative   • Not on file     Allergies   Allergen Reactions   • Tape      Blisters and scarring       Current Outpatient Medications   Medication Sig Dispense Refill   • apixaban (ELIQUIS) 5mg Tab Take 1 Tab by mouth 2 Times a Day. 180 Tab 7   • dutasteride (AVODART) 0.5 MG capsule      • FORMOTEROL FUMARATE INH Inhale  by mouth.     • Omega-3 Fatty Acids (FISH OIL PO) Take  by mouth.     • Ferrous Sulfate (IRON) 325 (65 Fe) MG Tab Take  by mouth.     • amiodarone (CORDARONE) 200 MG Tab Take 1 Tab by mouth every day. 90 Tab 3   • levothyroxine (SYNTHROID) 88 MCG Tab Take 88 mcg by mouth Every morning on an empty stomach.     • Glucosamine-Chondroit-Vit C-Mn (GLUCOSAMINE 1500 COMPLEX) Cap Take  by mouth.     • losartan-hydrochlorothiazide (HYZAAR) 50-12.5 MG per tablet Take 1 Tab by mouth every day.     • Multiple Vitamin (MULTIVITAMIN PO) Take  by mouth every day.       No current facility-administered medications for this visit.        ROS    All others systems reviewed and negative.     Objective:     /82 (BP Location: Left arm, Patient Position: Sitting, BP Cuff Size: Adult)   Pulse 61   Ht 1.88 m (6' 2\")   Wt 89.4 kg (197 lb)   SpO2 97%  Body mass index is 25.29 kg/m².    Physical Exam   General: No acute distress. " Well nourished.  HEENT: EOM grossly intact, no scleral icterus, no pharyngeal erythema.   Neck:  No JVD, no bruits, trachea midline  CVS: Slow, regular. Normal S1, S2. No M/R/G. No LE edema.  2+ radial pulses, 2+ PT pulses  Resp: CTAB. No wheezing or crackles/rhonchi. Normal respiratory effort.  Abdomen: Soft, NT, no catalino hepatomegaly.  MSK/Ext: No clubbing or cyanosis.  Skin: Warm and dry, no rashes.  Neurological: CN III-XII grossly intact. No focal deficits.   Psych: A&O x 3, appropriate affect, good judgement    Physical exam performed today and unchanged, except what is noted, compared to 7-    Assessment:     1. Paroxysmal atrial fibrillation (HCC)     2. Bradycardia     3. Mild persistent asthma without complication     4. Aortic ectasia, thoracic (HCC)     5. SOB (shortness of breath)     6. Essential hypertension     7. Renovascular hypertension     8. Stage 3a chronic kidney disease     9. On amiodarone therapy  Comp Metabolic Panel    TSH WITH REFLEX TO FT4   10. Atrial fibrillation, unspecified type (HCC)  apixaban (ELIQUIS) 5mg Tab   11. Pure hypercholesterolemia  Lipid Profile       Medical Decision Making:  Today's Assessment / Status / Plan:       -His [id number] is 4 with prior CVA but the CVA was not related to afib, very minor nuisance bleeding.  -Low threshold to reduce amiodarone to 100 mg, we discussed, he will stay 200 for now.   -For amiodarone surveillance, will need TSH, liver function every 6 months, close FU of respiratory symptoms, may need PFTs. We discussed today. Due April 2021  -Sees pulm, repeat PFTs looked good, I don't know why this would be denied given asthma and amiodarone.  -If we ever needed to stop amiodarone, consider multaq or EP consultation  -I am not worried about asc aorta at 3.9 cm, not changed over time.  -Blood work with PCP  -LDL 85, HDL is excellent  -RTC 9 months, try [place]    Return in about 9 months (around 10/21/2021).    It is my pleasure to  participate in the care of Mr. Kruger.  Please do not hesitate to contact me with questions or concerns.    Blanca Pink MD, [facility]  Cardiologist [facility]    Please note that this dictation was created using voice recognition software. I have made every reasonable attempt to correct obvious errors, but it is possible there are errors of grammar and possibly content that I did not discover before finalizing the note.

## 2021-01-25 ENCOUNTER — PATIENT MESSAGE (OUTPATIENT)
Dept: SLEEP MEDICINE | Facility: MEDICAL CENTER | Age: 76
End: 2021-01-25

## 2021-01-26 ENCOUNTER — PATIENT MESSAGE (OUTPATIENT)
Dept: SLEEP MEDICINE | Facility: MEDICAL CENTER | Age: 76
End: 2021-01-26

## 2021-01-26 RX ORDER — BUDESONIDE AND FORMOTEROL FUMARATE DIHYDRATE 160; 4.5 UG/1; UG/1
2 AEROSOL RESPIRATORY (INHALATION) 2 TIMES DAILY
Qty: 2 EACH | Refills: 3 | Status: SHIPPED | OUTPATIENT
Start: 2021-01-26 | End: 2021-01-27 | Stop reason: SDUPTHER

## 2021-01-26 NOTE — TELEPHONE ENCOUNTER
From: Giovany Kruger  To: Trena Little M.D.  Sent: 1/25/2021 3:44 PM PST  Subject: Non-Urgent Medical Question    I need a new prescription sent to [facility] for Symbicort as prescribed by you. I have previously been getting generic from [facility] and wish to get the Symbicort instead of the generic.   My [facility] [place] Member ID is [id number]. Please do not send to the [facility] in [place] which is now my local pharmacy for special orders only.  Thank you,  Fabio Kruger

## 2021-01-27 ENCOUNTER — PATIENT MESSAGE (OUTPATIENT)
Dept: SLEEP MEDICINE | Facility: MEDICAL CENTER | Age: 76
End: 2021-01-27

## 2021-01-27 RX ORDER — BUDESONIDE AND FORMOTEROL FUMARATE DIHYDRATE 160; 4.5 UG/1; UG/1
2 AEROSOL RESPIRATORY (INHALATION) 2 TIMES DAILY
Qty: 3 EACH | Refills: 3 | Status: SHIPPED | OUTPATIENT
Start: 2021-01-27 | End: 2021-07-23

## 2021-01-27 NOTE — TELEPHONE ENCOUNTER
From: Giovany Kruger  To: Trena Little M.D.  Sent: 1/26/2021 4:36 PM PST  Subject: Non-Urgent Medical Question    Thanks for the follow through on my billing question. I'll wait for all to shake out.    New request: I have been getting a generic inhaler for Symbicort from [facility]. I now wish to get the American Symbicort, 90 day prescription. Could you please send a prescription to [facility] for the Symbicort prescribed by Dr. Little. I told her about this at my last appointment,  [facility] [facility] Member ID # [id number].  Thank you,  Fabio Kruger

## 2021-01-28 NOTE — TELEPHONE ENCOUNTER
From: Giovany Kruger  To: Madelin Rice, Med Ass't  Sent: 1/27/2021 4:08 PM PST  Subject: Non-Urgent Medical Question    I just checked [facility] and the order is for Budesonide/Formoterol Fulminate Dihydrate 160-4.5. I have always taken, as prescribed by Dr. Little, 85-4.5 with the [place] generic being 100 - 4.5. I am calling [facility] to hold this prescription until we have it cleared up. Please get back to me ASAP. If Dr. Little is changing ny dosage please tell me why.      ----- Message -----   From:Madelin Rice, Med Ass't   Sent:1/27/2021 10:23 AM PST   To:Giovany Kruger   Subject:RE: Non-Urgent Medical Question    Good Morning Fabio,  Your prescription was sent to [facility].  Thanks,  Madelin       ----- Message -----   From:Giovany Kruger   Sent:1/26/2021 4:36 PM PST   To:Trena Little M.D.   Subject:Non-Urgent Medical Question    Thanks for the follow through on my billing question. I'll wait for all to shake out.    New request: I have been getting a generic inhaler for Symbicort from [facility]. I now wish to get the American Symbicort, 90 day prescription. Could you please send a prescription to [facility] for the Symbicort prescribed by Dr. Little. I told her about this at my last appointment,  [employer] [facility] Member ID # [id number].  Thank you,  Fabio Kruger

## 2021-01-29 RX ORDER — BUDESONIDE AND FORMOTEROL FUMARATE DIHYDRATE 80; 4.5 UG/1; UG/1
2 AEROSOL RESPIRATORY (INHALATION) 2 TIMES DAILY
Qty: 3 EACH | Refills: 3 | Status: SHIPPED | OUTPATIENT
Start: 2021-01-29 | End: 2022-03-09 | Stop reason: SDUPTHER

## 2021-02-05 ENCOUNTER — PATIENT MESSAGE (OUTPATIENT)
Dept: SLEEP MEDICINE | Facility: MEDICAL CENTER | Age: 76
End: 2021-02-05

## 2021-02-09 NOTE — PATIENT COMMUNICATION
I called [facility] and changed the RX to brand name Symbicort 80/4.5 mcg 3 inhalers with 3 refills.  This is what the patient requested.  I cancelled any generic and any 160/4.5 mcg orders.  Patient has been informed.

## 2021-02-09 NOTE — PATIENT COMMUNICATION
Dr. Little,   Could you confirm that a 90 day supply of Symbicort is 3 inhalers? I will advise patient of what his insurance will cover, but I want to be sure that I have all of the correct information first. Thank you!    Janelle

## 2021-02-09 NOTE — PATIENT COMMUNICATION
I called [facility] at [phone] and they transferred me to [facility] at [phone]. I spoke with Viviana at [facility] who advised me that both Symbicort 80-4.5 and the generic budesonide/formoterol were non-formulary. She transferred me to speak with another team about what other options that the patient will have to get this medication. I was transferred to and spoke with Jeanette. Jeanette says that there are no alternative medications, but she will check with another team to see if there are any other alternative options for the patient.     I spoke with Brielle in the Clinical Team Department. She says that brand Symbicort is not covered and patient will have to pay full cost. She also says that generic is covered and will cost $88.83 for three inhalers (90 day supply) and this was recently filled for the patient on 2/1/21. The other option for the patient is Advair Diskus or Adavair HFA, but that will be a Tier 3. If the patient insists on brand name, we can try to file a brand necessity request. I will send a message to Dr. Little with this information to see if there are any other medications that he can try, then I will advise the patient of what the options are.

## 2021-05-30 ENCOUNTER — PATIENT MESSAGE (OUTPATIENT)
Dept: CARDIOLOGY | Facility: CLINIC | Age: 76
End: 2021-05-30

## 2021-06-01 NOTE — PROGRESS NOTES
I actually do not have a recommendation.  There are so many of them.  I have lost track.  I apologize that I cannot be helpful.

## 2021-07-23 ENCOUNTER — SLEEP CENTER VISIT (OUTPATIENT)
Dept: SLEEP MEDICINE | Facility: MEDICAL CENTER | Age: 76
End: 2021-07-23
Payer: MEDICARE

## 2021-07-23 VITALS
OXYGEN SATURATION: 99 % | BODY MASS INDEX: 25.1 KG/M2 | SYSTOLIC BLOOD PRESSURE: 104 MMHG | RESPIRATION RATE: 16 BRPM | HEIGHT: 74 IN | HEART RATE: 54 BPM | DIASTOLIC BLOOD PRESSURE: 64 MMHG | WEIGHT: 195.6 LBS

## 2021-07-23 DIAGNOSIS — I48.91 ATRIAL FIBRILLATION, UNSPECIFIED TYPE (HCC): ICD-10-CM

## 2021-07-23 DIAGNOSIS — J45.30 MILD PERSISTENT ASTHMA WITHOUT COMPLICATION: ICD-10-CM

## 2021-07-23 PROCEDURE — 99214 OFFICE O/P EST MOD 30 MIN: CPT | Performed by: INTERNAL MEDICINE

## 2021-07-23 ASSESSMENT — FIBROSIS 4 INDEX: FIB4 SCORE: 1.08
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
## 2021-07-27 ENCOUNTER — PATIENT MESSAGE (OUTPATIENT)
Dept: SLEEP MEDICINE | Facility: MEDICAL CENTER | Age: 76
End: 2021-07-27

## 2021-07-27 DIAGNOSIS — R06.02 SOB (SHORTNESS OF BREATH): ICD-10-CM

## 2021-07-27 NOTE — TELEPHONE ENCOUNTER
From: Giovany Kruger  To: Physician Trena Little  Sent: 7/27/2021 2:18 PM PDT  Subject: Non-Urgent Medical Question    I tried to schedule appointments for Dr. Mix and for the breath test today. The  stated that she had no referral info for this test. Could you correct this with scheduling ? I will make my appointment for the  & the test for the same day as I did with you last appointment.    Very best wishes for your new position.    Thanks,  Fabio
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
## 2021-10-25 ENCOUNTER — OFFICE VISIT (OUTPATIENT)
Dept: CARDIOLOGY | Facility: CLINIC | Age: 76
End: 2021-10-25
Payer: MEDICARE

## 2021-10-25 VITALS
WEIGHT: 193 LBS | OXYGEN SATURATION: 96 % | HEART RATE: 62 BPM | BODY MASS INDEX: 24.77 KG/M2 | HEIGHT: 74 IN | DIASTOLIC BLOOD PRESSURE: 68 MMHG | RESPIRATION RATE: 16 BRPM | SYSTOLIC BLOOD PRESSURE: 112 MMHG

## 2021-10-25 DIAGNOSIS — Z86.73 HISTORY OF CEREBROVASCULAR ACCIDENT (CVA) IN ADULTHOOD: ICD-10-CM

## 2021-10-25 DIAGNOSIS — R06.02 SOB (SHORTNESS OF BREATH): ICD-10-CM

## 2021-10-25 DIAGNOSIS — J45.30 MILD PERSISTENT ASTHMA WITHOUT COMPLICATION: ICD-10-CM

## 2021-10-25 DIAGNOSIS — N18.31 STAGE 3A CHRONIC KIDNEY DISEASE: ICD-10-CM

## 2021-10-25 DIAGNOSIS — Z79.899 ON AMIODARONE THERAPY: ICD-10-CM

## 2021-10-25 DIAGNOSIS — I10 ESSENTIAL HYPERTENSION: ICD-10-CM

## 2021-10-25 DIAGNOSIS — E78.00 PURE HYPERCHOLESTEROLEMIA: ICD-10-CM

## 2021-10-25 DIAGNOSIS — I15.0 RENOVASCULAR HYPERTENSION: ICD-10-CM

## 2021-10-25 DIAGNOSIS — I48.0 PAROXYSMAL ATRIAL FIBRILLATION (HCC): ICD-10-CM

## 2021-10-25 DIAGNOSIS — R00.1 BRADYCARDIA: ICD-10-CM

## 2021-10-25 PROCEDURE — 99214 OFFICE O/P EST MOD 30 MIN: CPT | Performed by: INTERNAL MEDICINE

## 2021-10-25 RX ORDER — AMIODARONE HYDROCHLORIDE 100 MG/1
100 TABLET ORAL DAILY
Qty: 90 TABLET | COMMUNITY
Start: 2021-10-25 | End: 2022-04-12 | Stop reason: SDUPTHER

## 2021-10-25 ASSESSMENT — FIBROSIS 4 INDEX: FIB4 SCORE: 1.08

## 2021-10-25 NOTE — LETTER
[facility] [address],   [address]nd [address]  [place] [address]  Phone: [phone]  Fax: [phone]              Giovany Kruger  1945    Encounter Date: 10/25/2021    Blanca Pink M.D.          PROGRESS NOTE:  Subjective:   Chief Complaint:   Chief Complaint   Patient presents with   • Atrial Fibrillation     F/V Dx: Paroxysmal atrial fibrillation (HCC)       Giovany Kruger is a 76 y.o. male who returns today for paroxysmal atrial fibrillation, prior CVA (not from afib), chronic anticoagulation, antiarrhythmic medication, aortic ectasia and hypertension, HLP, mild asthma    Saw Dr. Hyun Mendiola about 10 years ago for afib.  He had noted palpitations for years but no more.  Has PAF.   Remains on amiodarone for his A. fib.  Normal liver and TSH function April 2020.  Has mild asthma, sees Dr. Little now for pulmonary.  PFTs 7-17-19 and 2020, looked good.    Had CVA in 1980 after fall, broken neck.   Notes mild LLE drag and very mid left handed coordination. Not clear if they were related.  Not felt to be from afib.    On apixaban, [id number] 4 with prior CVA but CVA was not afib.  Prior bruising but no serious bleeding.  Gets skin tears and it bleeds for a day.    Has hypertension, blood pressure controlled on 2 meds.    Has mild hyperlipidemia, LDL cholesterol 114 -> 85, then up to 120 mg.    CKD3a.    Terms of aortic ectasia, echo in 2019 with acsending aorta 3.8 cm, CT scan and 2019 with a sending aorta 3.9 cm.    Physically active. Likes to exercise. Has the old fashioned [employer] bike.  He is not limited by chest pain, pressure or tightness with activity.   No significant dyspnea on exertion, orthopnea or lower extremity swelling.     Prior GAY before, resolved spontaneously. PFTS normal 7-2019  No significant palpitations, lightheadedness, or presyncope/syncope.   No symptoms of leg claudication.   No stroke/TIA like  symptoms.    No family history of premature coronary artery disease.  Former smoker, quit in his 20s.  No history of diabetes.  No history of autoimmune disease such as lupus or rheumatoid arthritis.  CKD 3, GFR 46.    He just moved from [place] to [place].  From [place], moved in , liked the area.    He brother  recently from cancer, sister from lung infection from birds.    DATA REVIEWED by me:  ECG 2019  Sinus, 83, first-degree AV delay, delayed R wave progression, left anterior fascicular block, nonspecific T wave changes    Zio 10-22-19  Sinus rhythm with variable rate response.  Noted nocturnal bradycardia - no concurrent diary entries.  No noted atrial fibrillation.    Echo 2019  Compared to the images of the prior study done on 18, no   significant change.  Left ventricular ejection fraction is visually estimated to be 65%.  Mild tricuspid regurgitation.  Estimated right ventricular systolic pressure  is 30 mmHg.  Ascending aorta diameter is 3.8 cm.    Nuclear stress test 2019   normal perfusion, EF 58%    CT chest 2019  Ascending aorta 3.9 cm, 5 mm groundglass nodule.    PFTs 2020  Interpretation:   Normal lung function and gas transfer.    PFTS: 19  Impression  Normal spirometry no response to bronchodilator.  Evidence of air trapping otherwise normal total lung capacity.  Normal gas transfer.  May be early signs of obstructive lung disease clinically correlate      Most recent labs:     Lab Results   Component Value Date/Time    HEMOGLOBIN 14.1 2021 07:19 AM    HEMATOCRIT 42.4 2021 07:19 AM    MCV 96.4 (H) 2021 07:19 AM    TSH 4.080 2016 08:24 AM      Lab Results   Component Value Date/Time    SODIUM 143 2021 07:19 AM    POTASSIUM 3.9 2021 07:19 AM    CHLORIDE 107 2021 07:19 AM    CO2 27 2021 07:19 AM    GLUCOSE 104 (H) 2021 07:19 AM    BUN 24 (H) 2021 07:19 AM    CREATININE 1.5 (H) 2021 07:19  AM      Lab Results   Component Value Date/Time    ASTSGOT 24 2021 07:19 AM    ALTSGPT 29 2021 07:19 AM    ALBUMIN 3.7 2021 07:19 AM      Lab Results   Component Value Date/Time    CHOLSTRLTOT 222 (H) 2021 07:19 AM     (H) 2021 07:19 AM    HDL 94.0 (H) 2021 07:19 AM    TRIGLYCERIDE 38 2021 07:19 AM       2019 hemoglobin 13.4, MCV 97, platelets 354, sodium 136, testing 3.8, creatinine 1.44, GFR 48, LFTs normal, TSH 2.1, proBNP 290, , HDL 88, triglycerides 53, total cholesterol 218    Past Medical History:   Diagnosis Date   • Arrhythmia    • ASTHMA    • Atrial fibrillation (HCC)    • Back pain    • Chickenpox    • [place] measles    • Hypertension    • Peripheral vascular disease, unspecified (HCC)    • Stroke (HCC)    • Tonsillitis    • Unspecified disorder of thyroid      Past Surgical History:   Procedure Laterality Date   • INGUINAL HERNIA REPAIR  2012    Performed by RIGOBERTO MCINTYRE at SURGERY [facility] ORS   • INGUINAL HERNIA REPAIR  11/3/2011    Performed by RIGOBERTO MCINTYRE at SURGERY [facility] ORS   • OTHER      L3-4-5 susion   • OTHER      C1-2-3 fusion   • TONSILLECTOMY     • CARPAL TUNNEL RELEASE     • LAMINOTOMY      L 1,4,5 FUSION C1,2,3 FUSION     Family History   Problem Relation Age of Onset   • Cancer Other    • No Known Problems Mother    • No Known Problems Father    • Lung Disease Sister    • Cancer Brother      Social History     Socioeconomic History   • Marital status:      Spouse name: Not on file   • Number of children: Not on file   • Years of education: Not on file   • Highest education level: Not on file   Occupational History   • Not on file   Tobacco Use   • Smoking status: Former Smoker     Packs/day: 1.00     Years: 20.00     Pack years: 20.00     Types: Cigarettes     Quit date: 1980     Years since quittin.8   • Smokeless tobacco: Never Used   Vaping Use   • Vaping Use: Never used    Substance and Sexual Activity   • Alcohol use: Yes     Alcohol/week: 4.2 - 16.8 oz     Types: 7 Standard drinks or equivalent per week     Comment: 7 DRINKS PER WEEK   • Drug use: Yes     Types: Marijuana, Oral   • Sexual activity: Not on file   Other Topics Concern   • Not on file   Social History Narrative   • Not on file     Social Determinants of Health     Financial Resource Strain:    • Difficulty of Paying Living Expenses:    Food Insecurity:    • Worried About Running Out of Food in the Last Year:    • Ran Out of Food in the Last Year:    Transportation Needs:    • Lack of Transportation (Medical):    • Lack of Transportation (Non-Medical):    Physical Activity:    • Days of Exercise per Week:    • Minutes of Exercise per Session:    Stress:    • Feeling of Stress :    Social Connections:    • Frequency of Communication with Friends and Family:    • Frequency of Social Gatherings with Friends and Family:    • Attends [employer] Services:    • Active Member of Clubs or Organizations:    • Attends Club or Organization Meetings:    • Marital Status:    Intimate Partner Violence:    • Fear of Current or Ex-Partner:    • Emotionally Abused:    • Physically Abused:    • Sexually Abused:      Allergies   Allergen Reactions   • Tape      Blisters and scarring       Current Outpatient Medications   Medication Sig Dispense Refill   • amiodarone (CORDARONE) 100 MG tablet Take 1 Tablet by mouth every day. 90 Tablet    • budesonide-formoterol (SYMBICORT) 80-4.5 MCG/ACT Aerosol Inhale 2 Puffs 2 Times a Day. Use spacer. Rinse mouth after each use. 3 Each 3   • apixaban (ELIQUIS) 5mg Tab Take 1 Tab by mouth 2 Times a Day. 180 Tab 7   • dutasteride (AVODART) 0.5 MG capsule      • Omega-3 Fatty Acids (FISH OIL PO) Take  by mouth.     • Ferrous Sulfate (IRON) 325 (65 Fe) MG Tab Take  by mouth.     • levothyroxine (SYNTHROID) 88 MCG Tab Take 88 mcg by mouth Every morning on an empty stomach.     • Glucosamine-Chondroit-Vit  "C-Mn (GLUCOSAMINE 1500 COMPLEX) Cap Take  by mouth.     • losartan-hydrochlorothiazide (HYZAAR) 50-12.5 MG per tablet Take 1 Tab by mouth every day.       No current facility-administered medications for this visit.       ROS    All others systems reviewed and negative.     Objective:     /68 (BP Location: Left arm, Patient Position: Sitting, BP Cuff Size: Adult)   Pulse 62   Resp 16   Ht 1.88 m (6' 2\")   Wt 87.5 kg (193 lb)   SpO2 96%  Body mass index is 24.78 kg/m².    Physical Exam   General: No acute distress. Well nourished.  HEENT: EOM grossly intact, no scleral icterus, no pharyngeal erythema.   Neck:  No JVD, no bruits, trachea midline  CVS: Slow, regular. Normal S1, S2. No M/R/G. No LE edema.  2+ radial pulses, 2+ PT pulses  Resp: CTAB. No wheezing or crackles/rhonchi. Normal respiratory effort.  Abdomen: Soft, NT, no catalino hepatomegaly.  MSK/Ext: No clubbing or cyanosis.  Skin: Warm and dry, no rashes.  Neurological: CN III-XII grossly intact. No focal deficits.   Psych: A&O x 3, appropriate affect, good judgement    Physical exam performed today and unchanged, except what is noted, compared to 1-21-21    Assessment:     1. Paroxysmal atrial fibrillation (HCC)     2. Bradycardia     3. Mild persistent asthma without complication     4. Essential hypertension     5. SOB (shortness of breath)     6. Renovascular hypertension     7. Stage 3a chronic kidney disease (HCC)     8. On amiodarone therapy  HEPATIC FUNCTION PANEL   9. Pure hypercholesterolemia     10. History of cerebrovascular accident (CVA) in adulthood         Medical Decision Making:  Today's Assessment / Status / Plan:       -His [id number] is 4 with prior CVA but the CVA was not related to afib, very minor nuisance bleeding/bruising.  -He says the CVA was mechanical so no reason to be on secondary prevention statin  -LDL has crept up from 85->120, not sure why, could be less exercise, he would like to work on this with exercise, see " below  -Reduce amiodarone to 100 mg, we discussed, he will stay 200 for now.   -For amiodarone surveillance, will need TSH, liver function every 6 months, close FU of respiratory symptoms, may need PFTs. We discussed today. Nov 2021  -TSH is drifting up, we need his PCP to step up  -Sees pulm, switching to Dr. Mix, repeat PFTs looking ok, I don't know why this would be denied given asthma and amiodarone.  -If we ever needed to stop amiodarone, consider multaq or EP consultation  -I am not worried about asc aorta at 3.9 cm, not changed over time.  -Blood work with PCP  -RTC 6 months, try [facility]    Written instructions given today:    -Reduce amiodarone from 200 mg to 100 mg, you can cut your current pills in half and I will send a new prescription.    -Your TSH 5 months ago had crept up to 5 meaning you may benefit from a slight increase in your levo thyroxine medication dose.  I typically defer this decision to your primary care.    -Please look into the following diets to lower LDL:    Mediterranean diet.  [facility]    Resources to learn more:  American Heart Association   Www.Cardiosmart.org, sponsored by the American College of cardiology.    -Consistent exercise can significantly reduce your LDL cholesterol.  Your prior LDL cholesterol was down to 85, now is back up to 120.  You should be able to get it back down under 100 through diet and exercise alone.  Goal LDL for you is 99 or under.      Return in about 6 months (around 4/25/2022).    It is my pleasure to participate in the care of Mr. Kruger.  Please do not hesitate to contact me with questions or concerns.    Blanca Pink MD, [facility]  Cardiologist [facility]    Please note that this dictation was created using voice recognition software. I have made every reasonable attempt to correct obvious errors, but it is possible there are errors of grammar and possibly content that I  did not discover before finalizing the note.        Gerald RAGSDALE M.D.  [address] [facility] [address]  Via Fax: [phone]

## 2021-10-25 NOTE — PATIENT INSTRUCTIONS
-Reduce amiodarone from 200 mg to 100 mg, you can cut your current pills in half and I will send a new prescription.    -Your TSH 5 months ago had crept up to 5 meaning you may benefit from a slight increase in your levo thyroxine medication dose.  I typically defer this decision to your primary care.    -Please look into the following diets to lower LDL:    Mediterranean diet.  [facility]    Resources to learn more:  American Heart Association   Www.Cardiosmart.org, sponsored by the American College of cardiology.    -Consistent exercise can significantly reduce your LDL cholesterol.  Your prior LDL cholesterol was down to 85, now is back up to 120.  You should be able to get it back down under 100 through diet and exercise alone.  Goal LDL for you is 99 or under.

## 2021-11-11 ENCOUNTER — TELEPHONE (OUTPATIENT)
Dept: CARDIOLOGY | Facility: MEDICAL CENTER | Age: 76
End: 2021-11-11

## 2021-11-11 NOTE — TELEPHONE ENCOUNTER
TSH  Message  Received: Today  Blanca Pink M.D.  Luisa Starr RSEAN.  Thyroid and liver function normal.        [employer] message sent to patient with results.

## 2022-01-05 ENCOUNTER — TELEPHONE (OUTPATIENT)
Dept: CARDIOLOGY | Facility: MEDICAL CENTER | Age: 77
End: 2022-01-05

## 2022-01-05 NOTE — TELEPHONE ENCOUNTER
"Called pt [phone]  Pt states he already received a call regarding his upcoming appt with [facility] in [place] at the new location.   Pt discussed work out schedule and his plans to increase his work out regimen. Explained the importance of \"listening to his body and to adjust his work out accordingly\" and also discussed importance of hydration during work out. Also provided ER precautions, pt verbalized understanding.   "

## 2022-01-05 NOTE — TELEPHONE ENCOUNTER
----- Message from Nash Jasmine Ass't sent at 1/5/2022 10:21 AM PST -----  Regarding: FW: Workout Q + a FYI    ----- Message -----  From: Giovany Kruger  Sent: 1/5/2022   9:46 AM PST  To: Paco Peguero/Wil  Subject: Workout Q + a FYI                                I am starting to increase my cardio/breathing work out.  Currently 15 minutes on treadmill with speed of 3 and incline at 1%  for 4-5 days/week.  I plan to slowly increase speed to 4 with 2% incline.  Maybe increase time also in the future.  Is this too much of a strain on the heart?  FYI:  I tried to schedule a [place] appt with you using [phone] and [phone].  Both had no idea how to schedule you at [place].  They gave me [phone] which did schedule me for April 12 but they had no record of me for previous visits or insurance.  I was a 'brand new' patient at their facility.  Is this the same office as I have been seeing you previously or a new office in a different location?  Why is this year so different?  Should I reschedule?

## 2022-02-07 DIAGNOSIS — I48.91 ATRIAL FIBRILLATION, UNSPECIFIED TYPE (HCC): ICD-10-CM

## 2022-03-09 ENCOUNTER — OFFICE VISIT (OUTPATIENT)
Dept: SLEEP MEDICINE | Facility: MEDICAL CENTER | Age: 77
End: 2022-03-09
Payer: MEDICARE

## 2022-03-09 VITALS
TEMPERATURE: 97.3 F | RESPIRATION RATE: 16 BRPM | SYSTOLIC BLOOD PRESSURE: 116 MMHG | OXYGEN SATURATION: 97 % | DIASTOLIC BLOOD PRESSURE: 70 MMHG | BODY MASS INDEX: 24.77 KG/M2 | WEIGHT: 193 LBS | HEIGHT: 74 IN | HEART RATE: 60 BPM

## 2022-03-09 DIAGNOSIS — I48.91 ATRIAL FIBRILLATION, UNSPECIFIED TYPE (HCC): ICD-10-CM

## 2022-03-09 DIAGNOSIS — J45.30 MILD PERSISTENT ASTHMA WITHOUT COMPLICATION: ICD-10-CM

## 2022-03-09 DIAGNOSIS — Z79.899 ON AMIODARONE THERAPY: ICD-10-CM

## 2022-03-09 PROCEDURE — 99214 OFFICE O/P EST MOD 30 MIN: CPT | Performed by: INTERNAL MEDICINE

## 2022-03-09 RX ORDER — BUDESONIDE AND FORMOTEROL FUMARATE DIHYDRATE 80; 4.5 UG/1; UG/1
2 AEROSOL RESPIRATORY (INHALATION) 2 TIMES DAILY
Qty: 3 EACH | Refills: 3 | Status: SHIPPED | OUTPATIENT
Start: 2022-03-09 | End: 2022-10-24

## 2022-03-09 ASSESSMENT — ENCOUNTER SYMPTOMS
FOCAL WEAKNESS: 0
DOUBLE VISION: 0
ORTHOPNEA: 0
EYE PAIN: 0
SPEECH CHANGE: 0
VOMITING: 0
SORE THROAT: 0
HEADACHES: 0
SINUS PAIN: 0
DIZZINESS: 0
FEVER: 0
ABDOMINAL PAIN: 0
FALLS: 0
CONSTIPATION: 0
PND: 0
WHEEZING: 0
PHOTOPHOBIA: 0
HEMOPTYSIS: 0
BACK PAIN: 0
DIAPHORESIS: 0
PALPITATIONS: 0
EYE DISCHARGE: 0
SHORTNESS OF BREATH: 0
NAUSEA: 0
TREMORS: 0
HEARTBURN: 0
CLAUDICATION: 0
STRIDOR: 0
CHILLS: 0
WEAKNESS: 0
EYE REDNESS: 0
COUGH: 0
WEIGHT LOSS: 0
DIARRHEA: 0
SPUTUM PRODUCTION: 0
DEPRESSION: 0
NECK PAIN: 0
BLURRED VISION: 0
MYALGIAS: 0

## 2022-03-09 ASSESSMENT — FIBROSIS 4 INDEX: FIB4 SCORE: 1.03

## 2022-03-09 NOTE — PROGRESS NOTES
Chief Complaint   Patient presents with   • Follow-Up     Last seen 21 Dr. Little          HPI: This patient is a 76 y.o. male whom is followed in our clinic for mild intermittent asthma last seen by Dr. Little on 21. He is also followed by cardiology for AF, on chronic, low-dose amiodarone at 100 mg daily. Asthma is long-standing and sxs include wheezing and cough both of which are controlled on low-dose ISC/LABA with symbicort 80; no rescue inhaler use or tx for acute exacerbation. He exercises daily. PFTs last done in  were essentially normal. The pt did have 5 mm LLL nodule on CT in 2019 that it appears was being followed with CXR. Most recent CXR from 2020 was clear. Pt has no acute complaints today.    Past Medical History:   Diagnosis Date   • Arrhythmia    • ASTHMA    • Atrial fibrillation (HCC)    • Back pain    • Chickenpox    • Portuguese measles    • Hypertension    • Peripheral vascular disease, unspecified (HCC)    • Stroke (HCC)    • Tonsillitis    • Unspecified disorder of thyroid        Social History     Socioeconomic History   • Marital status:      Spouse name: Not on file   • Number of children: Not on file   • Years of education: Not on file   • Highest education level: Not on file   Occupational History   • Not on file   Tobacco Use   • Smoking status: Former Smoker     Packs/day: 1.00     Years: 20.00     Pack years: 20.00     Types: Cigarettes     Quit date: 1980     Years since quittin.2   • Smokeless tobacco: Never Used   Vaping Use   • Vaping Use: Never used   Substance and Sexual Activity   • Alcohol use: Yes     Alcohol/week: 4.2 - 16.8 oz     Types: 7 Standard drinks or equivalent per week     Comment: 7 DRINKS PER WEEK   • Drug use: Not Currently     Types: Marijuana, Oral     Comment: has not used 60 years ago    • Sexual activity: Not on file   Other Topics Concern   • Not on file   Social History Narrative   • Not on file     Social Determinants of Health      Financial Resource Strain: Not on file   Food Insecurity: Not on file   Transportation Needs: Not on file   Physical Activity: Not on file   Stress: Not on file   Social Connections: Not on file   Intimate Partner Violence: Not on file   Housing Stability: Not on file       Family History   Problem Relation Age of Onset   • Cancer Other    • No Known Problems Mother    • No Known Problems Father    • Lung Disease Sister    • Cancer Brother        Current Outpatient Medications on File Prior to Visit   Medication Sig Dispense Refill   • apixaban (ELIQUIS) 5mg Tab Take 1 Tablet by mouth 2 times a day. 180 Tablet 2   • amiodarone (CORDARONE) 100 MG tablet Take 1 Tablet by mouth every day. 90 Tablet    • dutasteride (AVODART) 0.5 MG capsule      • Omega-3 Fatty Acids (FISH OIL PO) Take  by mouth.     • Ferrous Sulfate (IRON) 325 (65 Fe) MG Tab Take  by mouth.     • levothyroxine (SYNTHROID) 88 MCG Tab Take 88 mcg by mouth Every morning on an empty stomach.     • Glucosamine-Chondroit-Vit C-Mn (GLUCOSAMINE 1500 COMPLEX) Cap Take  by mouth.     • losartan-hydrochlorothiazide (HYZAAR) 50-12.5 MG per tablet Take 1 Tab by mouth every day.       No current facility-administered medications on file prior to visit.       Tape      ROS:   Review of Systems   Constitutional: Negative for chills, diaphoresis, fever, malaise/fatigue and weight loss.   HENT: Negative for congestion, ear discharge, ear pain, hearing loss, nosebleeds, sinus pain, sore throat and tinnitus.    Eyes: Negative for blurred vision, double vision, photophobia, pain, discharge and redness.   Respiratory: Negative for cough, hemoptysis, sputum production, shortness of breath, wheezing and stridor.    Cardiovascular: Negative for chest pain, palpitations, orthopnea, claudication, leg swelling and PND.   Gastrointestinal: Negative for abdominal pain, constipation, diarrhea, heartburn, nausea and vomiting.   Genitourinary: Negative for dysuria and urgency.  "  Musculoskeletal: Negative for back pain, falls, joint pain, myalgias and neck pain.   Skin: Negative for itching and rash.   Neurological: Negative for dizziness, tremors, speech change, focal weakness, weakness and headaches.   Endo/Heme/Allergies: Negative for environmental allergies.   Psychiatric/Behavioral: Negative for depression.       /70   Pulse 60   Temp 36.3 °C (97.3 °F) (Temporal)   Resp 16   Ht 1.88 m (6' 2\")   Wt 87.5 kg (193 lb)   SpO2 97%   Physical Exam  Vitals reviewed.   Constitutional:       General: He is not in acute distress.     Appearance: Normal appearance. He is normal weight.   HENT:      Head: Normocephalic and atraumatic.      Right Ear: External ear normal.      Left Ear: External ear normal.      Nose: Nose normal. No congestion.      Mouth/Throat:      Mouth: Mucous membranes are moist.      Pharynx: Oropharynx is clear. No oropharyngeal exudate.   Eyes:      General: No scleral icterus.     Extraocular Movements: Extraocular movements intact.      Conjunctiva/sclera: Conjunctivae normal.      Pupils: Pupils are equal, round, and reactive to light.   Cardiovascular:      Rate and Rhythm: Normal rate and regular rhythm.      Heart sounds: Normal heart sounds. No murmur heard.    No gallop.   Pulmonary:      Effort: Pulmonary effort is normal. No respiratory distress.      Breath sounds: Normal breath sounds. No wheezing or rales.   Abdominal:      General: There is no distension.      Palpations: Abdomen is soft.   Musculoskeletal:         General: Normal range of motion.      Cervical back: Normal range of motion and neck supple.      Right lower leg: No edema.      Left lower leg: No edema.   Skin:     General: Skin is warm and dry.      Findings: No rash.   Neurological:      Mental Status: He is alert and oriented to person, place, and time.      Cranial Nerves: No cranial nerve deficit.   Psychiatric:         Behavior: Behavior normal.         PFTs as reviewed by me " personally:  As per hPI    Imaging as reviewed by me personally:  As per hPI    Assessment:  1. Mild persistent asthma without complication  PULMONARY FUNCTION TESTS -Test requested: Complete Pulmonary Function Test   2. Atrial fibrillation, unspecified type (HCC)     3. On amiodarone therapy  PULMONARY FUNCTION TESTS -Test requested: Complete Pulmonary Function Test   4. Lung nodule < 6cm on CT         Plan:  1. Chronic, stable and well controlled on symbicort 80. Continue this. Update PFTs  2. Chronic, normal rate and rhythm today; on amiodarone; will continue annual PFT  3. Annual PFT as per above  4. No f/u CT. Pt does have tobacco history therefore will order updated CT chest  Return in about 6 months (around 9/9/2022) for PFTS as soon as can be scheduled; pt prefers May 2 if available.

## 2022-03-10 ENCOUNTER — PATIENT MESSAGE (OUTPATIENT)
Dept: SLEEP MEDICINE | Facility: MEDICAL CENTER | Age: 77
End: 2022-03-10
Payer: MEDICARE

## 2022-03-10 NOTE — PATIENT COMMUNICATION
I spoke to Jere at [facility]. They have the Ct order and patient will be contacted to schedule.     Pt informed via [facility] and Left vm to patient

## 2022-03-21 ENCOUNTER — PATIENT MESSAGE (OUTPATIENT)
Dept: CARDIOLOGY | Facility: PHYSICIAN GROUP | Age: 77
End: 2022-03-21
Payer: MEDICARE

## 2022-03-22 NOTE — PROGRESS NOTES
Coronary artery calcification on CT scan is noted.  We can discuss this at next visit.  I am not concerned.  Thank you.

## 2022-08-03 ENCOUNTER — OFFICE VISIT (OUTPATIENT)
Dept: SLEEP MEDICINE | Facility: MEDICAL CENTER | Age: 77
End: 2022-08-03
Payer: MEDICARE

## 2022-08-03 ENCOUNTER — NON-PROVIDER VISIT (OUTPATIENT)
Dept: SLEEP MEDICINE | Facility: MEDICAL CENTER | Age: 77
End: 2022-08-03
Attending: INTERNAL MEDICINE
Payer: MEDICARE

## 2022-08-03 VITALS
DIASTOLIC BLOOD PRESSURE: 66 MMHG | OXYGEN SATURATION: 97 % | HEART RATE: 64 BPM | WEIGHT: 192 LBS | RESPIRATION RATE: 16 BRPM | SYSTOLIC BLOOD PRESSURE: 124 MMHG | BODY MASS INDEX: 24.64 KG/M2 | HEIGHT: 74 IN

## 2022-08-03 VITALS — HEIGHT: 74 IN | BODY MASS INDEX: 24.64 KG/M2 | WEIGHT: 192 LBS

## 2022-08-03 DIAGNOSIS — I48.91 ATRIAL FIBRILLATION, UNSPECIFIED TYPE (HCC): ICD-10-CM

## 2022-08-03 DIAGNOSIS — R91.8 PULMONARY NODULES: ICD-10-CM

## 2022-08-03 DIAGNOSIS — Z79.899 ON AMIODARONE THERAPY: ICD-10-CM

## 2022-08-03 DIAGNOSIS — J45.30 MILD PERSISTENT ASTHMA WITHOUT COMPLICATION: ICD-10-CM

## 2022-08-03 PROCEDURE — 99214 OFFICE O/P EST MOD 30 MIN: CPT | Mod: 25 | Performed by: INTERNAL MEDICINE

## 2022-08-03 PROCEDURE — 94726 PLETHYSMOGRAPHY LUNG VOLUMES: CPT | Performed by: INTERNAL MEDICINE

## 2022-08-03 PROCEDURE — 94060 EVALUATION OF WHEEZING: CPT | Performed by: INTERNAL MEDICINE

## 2022-08-03 PROCEDURE — 94729 DIFFUSING CAPACITY: CPT | Performed by: INTERNAL MEDICINE

## 2022-08-03 RX ORDER — ALBUTEROL SULFATE 90 UG/1
1-2 AEROSOL, METERED RESPIRATORY (INHALATION) EVERY 4 HOURS PRN
Qty: 1 EACH | Refills: 6 | Status: SHIPPED | OUTPATIENT
Start: 2022-08-03 | End: 2023-01-16 | Stop reason: SDUPTHER

## 2022-08-03 RX ORDER — BUDESONIDE AND FORMOTEROL FUMARATE DIHYDRATE 160; 4.5 UG/1; UG/1
2 AEROSOL RESPIRATORY (INHALATION) 2 TIMES DAILY
Qty: 1 EACH | Refills: 1 | Status: SHIPPED | OUTPATIENT
Start: 2022-08-03 | End: 2022-08-03

## 2022-08-03 RX ORDER — TAMSULOSIN HYDROCHLORIDE 0.4 MG/1
0.4 CAPSULE ORAL EVERY EVENING
COMMUNITY
Start: 2022-07-14

## 2022-08-03 ASSESSMENT — ENCOUNTER SYMPTOMS
ORTHOPNEA: 0
SPUTUM PRODUCTION: 0
WHEEZING: 0
FALLS: 0
WEIGHT LOSS: 0
SINUS PAIN: 0
FOCAL WEAKNESS: 0
SPEECH CHANGE: 0
VOMITING: 0
BACK PAIN: 0
NAUSEA: 0
MYALGIAS: 0
SHORTNESS OF BREATH: 1
TREMORS: 0
CHILLS: 0
HEARTBURN: 0
PALPITATIONS: 0
DOUBLE VISION: 0
PND: 0
HEADACHES: 0
DIZZINESS: 0
WEAKNESS: 0
FEVER: 0
HEMOPTYSIS: 0
EYE DISCHARGE: 0
BLURRED VISION: 0
SORE THROAT: 0
NECK PAIN: 0
DIAPHORESIS: 0
STRIDOR: 0
ABDOMINAL PAIN: 0
COUGH: 0
CLAUDICATION: 0
EYE PAIN: 0
DIARRHEA: 0
CONSTIPATION: 0
DEPRESSION: 0
PHOTOPHOBIA: 0
EYE REDNESS: 0

## 2022-08-03 ASSESSMENT — FIBROSIS 4 INDEX
FIB4 SCORE: 0.86
FIB4 SCORE: 0.86

## 2022-08-03 ASSESSMENT — PULMONARY FUNCTION TESTS
FVC_LLN: 3.85
FEV1/FVC_PERCENT_CHANGE: 1
FEV1_PERCENT_PREDICTED: 105
FVC: 5.09
FEV1/FVC_PERCENT_PREDICTED: 96
FEV1_PREDICTED: 3.4
FEV1_PERCENT_CHANGE: -2
FVC_PERCENT_PREDICTED: 107
FEV1/FVC_PERCENT_PREDICTED: 97
FEV1/FVC_PERCENT_LLN: 62
FEV1_LLN: 2.84
FEV1_PERCENT_PREDICTED: 104
FEV1/FVC: 71
FEV1/FVC_PERCENT_PREDICTED: 95
FVC: 4.96
FEV1/FVC_PREDICTED: 74
FEV1/FVC: 71.57
FEV1/FVC: 72
FEV1: 3.55
FVC_PREDICTED: 4.61
FEV1/FVC_PERCENT_PREDICTED: 95
FEV1/FVC_PERCENT_CHANGE: 50
FEV1/FVC_PERCENT_PREDICTED: 74
FEV1: 3.6
FEV1/FVC: 71
FVC_PERCENT_PREDICTED: 110
FEV1_PERCENT_CHANGE: -1

## 2022-08-03 NOTE — PROCEDURES
Technician: Tamika Sanchez RRT, CPFT  Good patient effort & cooperation. Patient has slight gag.  The results of this test meet the ATS/ERS standards for acceptability & reproducibility.  Test was performed on the AGRIMAPS Body Plethysmograph-Elite DX system.  Predicted equations for Spirometry are GLI-2012, ITS for lung volumes, and GLI-2017 for DLCO.  The DLCO was uncorrected for Hgb.  A bronchodilator of Ventolin HFA -2puffs via spacer administered.  DLCO performed during dilation period.    Interpretation;   Baseline spirometry shows normal airflows.  No significant bronchodilator response.  Total lung capacity is upper limits of normal at 120% predicted.  There is air trapping with residual volume of 173% predicted.  Diffusion capacity is preserved at 91% predicted.  Pulmonary function testing shows evidence of obstruction based on air trapping but otherwise normal pulmonary function testing.

## 2022-08-03 NOTE — PROGRESS NOTES
Chief Complaint   Patient presents with   • Asthma     LAST SEEN 3/9/22          HPI: This patient is a 77 y.o. male whom is followed in our clinic for asthma, pulmonary nodule and chronic amiodarone use last seen by me on 3/9/22. . He is followed by cardiology for AF, on chronic, low-dose amiodarone at 100 mg daily. Asthma is long-standing and sxs include wheezing and cough both of which were controlled on low-dose ISC/LABA with symbicort 80 w/o rescue inhaler use or tx for acute exacerbation. PFTs last done in 2020 were essentially normal. The pt did have 5 mm LLL nodule on CT in 2019 for which we did a repeat CT in March and showed stable LLL nodule, stable RLL 6 mm nodule not commented on in 2019 but present when reviewed by me. Mild scarring in RLL also stable.  Updated pulmonary function testing today shows normal airflows with normal lung volumes however there is air trapping and preserved diffusion capacity at 91% predicted.  No bronchodilator response.  The patient tells me he has had more shortness of breath over the past several months.  No significant wheezing or cough but he gets short of breath sometimes just walking from his bedroom to the bathroom and feels as though he is panting.  He denies palpitations, chest pain or swelling.  He does have good days and bad days stating that he did some volunteering this past week and was walking back and forth multiple times during the day related to this with no symptoms.    Past Medical History:   Diagnosis Date   • Arrhythmia    • ASTHMA    • Atrial fibrillation (HCC)    • Back pain    • Chickenpox    • Romanian measles    • Hypertension    • Peripheral vascular disease, unspecified (HCC)    • Stroke (HCC) 1980   • Tonsillitis    • Unspecified disorder of thyroid        Social History     Socioeconomic History   • Marital status:      Spouse name: Not on file   • Number of children: Not on file   • Years of education: Not on file   • Highest education  level: Not on file   Occupational History   • Not on file   Tobacco Use   • Smoking status: Former Smoker     Packs/day: 1.00     Years: 20.00     Pack years: 20.00     Types: Cigarettes     Quit date: 1980     Years since quittin.6   • Smokeless tobacco: Never Used   Vaping Use   • Vaping Use: Never used   Substance and Sexual Activity   • Alcohol use: Yes     Alcohol/week: 4.2 - 16.8 oz     Types: 7 Standard drinks or equivalent per week     Comment: 7 DRINKS PER WEEK   • Drug use: Not Currently     Types: Marijuana, Oral     Comment: has not used 60 years ago    • Sexual activity: Not on file   Other Topics Concern   • Not on file   Social History Narrative   • Not on file     Social Determinants of Health     Financial Resource Strain: Not on file   Food Insecurity: Not on file   Transportation Needs: Not on file   Physical Activity: Not on file   Stress: Not on file   Social Connections: Not on file   Intimate Partner Violence: Not on file   Housing Stability: Not on file       Family History   Problem Relation Age of Onset   • Cancer Other    • No Known Problems Mother    • No Known Problems Father    • Lung Disease Sister    • Cancer Brother        Current Outpatient Medications on File Prior to Visit   Medication Sig Dispense Refill   • tamsulosin (FLOMAX) 0.4 MG capsule      • apixaban (ELIQUIS) 5mg Tab Take 1 Tablet by mouth 2 times a day. 180 Tablet 7   • amiodarone (CORDARONE) 100 MG tablet Take 1 Tablet by mouth every day. 90 Tablet 7   • losartan-hydrochlorothiazide (HYZAAR) 50-12.5 MG per tablet Take 1 Tablet by mouth every day. 90 Tablet 7   • budesonide-formoterol (SYMBICORT) 80-4.5 MCG/ACT Aerosol Inhale 2 Puffs 2 times a day. Use spacer. Rinse mouth after each use. 3 Each 3   • Omega-3 Fatty Acids (FISH OIL PO) Take  by mouth.     • Ferrous Sulfate (IRON) 325 (65 Fe) MG Tab Take  by mouth.     • levothyroxine (SYNTHROID) 88 MCG Tab Take 88 mcg by mouth Every morning on an empty stomach.    "  • Glucosamine-Chondroit-Vit C-Mn (GLUCOSAMINE 1500 COMPLEX) Cap Take  by mouth.       No current facility-administered medications on file prior to visit.       Tape      ROS:   Review of Systems   Constitutional: Negative for chills, diaphoresis, fever, malaise/fatigue and weight loss.   HENT: Negative for congestion, ear discharge, ear pain, hearing loss, nosebleeds, sinus pain, sore throat and tinnitus.    Eyes: Negative for blurred vision, double vision, photophobia, pain, discharge and redness.   Respiratory: Positive for shortness of breath. Negative for cough, hemoptysis, sputum production, wheezing and stridor.    Cardiovascular: Negative for chest pain, palpitations, orthopnea, claudication, leg swelling and PND.   Gastrointestinal: Negative for abdominal pain, constipation, diarrhea, heartburn, nausea and vomiting.   Genitourinary: Negative for dysuria and urgency.   Musculoskeletal: Negative for back pain, falls, joint pain, myalgias and neck pain.   Skin: Negative for itching and rash.   Neurological: Negative for dizziness, tremors, speech change, focal weakness, weakness and headaches.   Endo/Heme/Allergies: Negative for environmental allergies.   Psychiatric/Behavioral: Negative for depression.       /66 (BP Location: Left arm, Patient Position: Sitting, BP Cuff Size: Adult)   Pulse 64   Resp 16   Ht 1.88 m (6' 2\")   Wt 87.1 kg (192 lb)   SpO2 97%   Physical Exam  Vitals reviewed.   Constitutional:       General: He is not in acute distress.     Appearance: Normal appearance. He is normal weight.   HENT:      Head: Normocephalic and atraumatic.      Right Ear: External ear normal.      Left Ear: External ear normal.      Nose: Nose normal. No congestion.      Mouth/Throat:      Mouth: Mucous membranes are moist.      Pharynx: Oropharynx is clear. No oropharyngeal exudate.   Eyes:      General: No scleral icterus.     Extraocular Movements: Extraocular movements intact.      " Conjunctiva/sclera: Conjunctivae normal.      Pupils: Pupils are equal, round, and reactive to light.   Cardiovascular:      Rate and Rhythm: Normal rate and regular rhythm.      Heart sounds: Normal heart sounds. No murmur heard.    No gallop.   Pulmonary:      Effort: Pulmonary effort is normal. No respiratory distress.      Breath sounds: Normal breath sounds. No wheezing or rales.   Abdominal:      General: There is no distension.      Palpations: Abdomen is soft.   Musculoskeletal:         General: Normal range of motion.      Cervical back: Normal range of motion and neck supple.      Right lower leg: No edema.      Left lower leg: No edema.   Skin:     General: Skin is warm and dry.      Findings: No rash.   Neurological:      Mental Status: He is alert and oriented to person, place, and time.      Cranial Nerves: No cranial nerve deficit.   Psychiatric:         Mood and Affect: Mood normal.         Behavior: Behavior normal.         PFTs as reviewed by me personally: As per HPI    Imaging as reviewed by me personally: As per HPI    Assessment:  1. Mild persistent asthma without complication     2. On amiodarone therapy     3. Atrial fibrillation, unspecified type (HCC)     4. Pulmonary nodules         Plan:  1.  This is chronic and was previously well controlled.  I am not sure his more recent symptoms are related to asthma given no obstruction on PFTs although he does have air trapping.  We agreed to a trial of doubling up on his Symbicort to 4 puffs twice daily to get to the equivalent of Symbicort 160 and I prescribed him albuterol for as needed use.  He sees cardiology later this month and we may want to consider Holter monitor given relative bradycardia and history of A. Fib.  2.  No evidence for pulmonary toxicity based on imaging or PFTs.  Followed by cardiology.  3.  Regular rate and rhythm in clinic today however he does have a relative bradycardia.  We discussed how any worsening of bradycardia may  contribute to shortness of breath if present in which case we might want to consider Holter if symptoms do not improve with stepping up asthma therapy.  4.  2 subcentimeter pulmonary nodules are stable for greater than 2 years in a non-smoker.  No indication for ongoing surveillance imaging.  Return in about 4 months (around 12/3/2022) for sob.

## 2022-08-26 ENCOUNTER — PATIENT MESSAGE (OUTPATIENT)
Dept: CARDIOLOGY | Facility: MEDICAL CENTER | Age: 77
End: 2022-08-26
Payer: MEDICARE

## 2022-08-27 NOTE — TELEPHONE ENCOUNTER
----- Message from Epifanio Munoz M.D. sent at 8/26/2022  3:22 PM PDT -----  Please let him know that his echocardiogram is unchanged from 2019.  Will await treadmill stress test results.  Thanks.

## 2022-10-22 ENCOUNTER — PATIENT MESSAGE (OUTPATIENT)
Dept: SLEEP MEDICINE | Facility: MEDICAL CENTER | Age: 77
End: 2022-10-22
Payer: MEDICARE

## 2022-10-24 RX ORDER — BUDESONIDE AND FORMOTEROL FUMARATE DIHYDRATE 160; 4.5 UG/1; UG/1
2 AEROSOL RESPIRATORY (INHALATION) 2 TIMES DAILY
Qty: 3 EACH | Refills: 3 | Status: SHIPPED | OUTPATIENT
Start: 2022-10-24 | End: 2023-01-16 | Stop reason: SDUPTHER

## 2023-01-12 ENCOUNTER — OFFICE VISIT (OUTPATIENT)
Dept: SLEEP MEDICINE | Facility: MEDICAL CENTER | Age: 78
End: 2023-01-12
Payer: MEDICARE

## 2023-01-12 VITALS
HEIGHT: 74 IN | OXYGEN SATURATION: 99 % | DIASTOLIC BLOOD PRESSURE: 68 MMHG | BODY MASS INDEX: 24.38 KG/M2 | HEART RATE: 58 BPM | SYSTOLIC BLOOD PRESSURE: 102 MMHG | RESPIRATION RATE: 16 BRPM | WEIGHT: 190 LBS

## 2023-01-12 DIAGNOSIS — J45.30 MILD PERSISTENT ASTHMA WITHOUT COMPLICATION: ICD-10-CM

## 2023-01-12 DIAGNOSIS — Z79.899 ON AMIODARONE THERAPY: ICD-10-CM

## 2023-01-12 DIAGNOSIS — I48.91 ATRIAL FIBRILLATION, UNSPECIFIED TYPE (HCC): ICD-10-CM

## 2023-01-12 DIAGNOSIS — R91.8 PULMONARY NODULES: ICD-10-CM

## 2023-01-12 DIAGNOSIS — R06.02 SOB (SHORTNESS OF BREATH): ICD-10-CM

## 2023-01-12 PROCEDURE — 99214 OFFICE O/P EST MOD 30 MIN: CPT | Performed by: INTERNAL MEDICINE

## 2023-01-12 ASSESSMENT — FIBROSIS 4 INDEX: FIB4 SCORE: 0.86

## 2023-01-13 ASSESSMENT — ENCOUNTER SYMPTOMS
BACK PAIN: 0
WHEEZING: 0
FOCAL WEAKNESS: 0
DIAPHORESIS: 0
CLAUDICATION: 0
EYE REDNESS: 0
TREMORS: 0
PALPITATIONS: 0
HEMOPTYSIS: 0
FEVER: 0
CONSTIPATION: 0
HEARTBURN: 0
EYE PAIN: 0
MYALGIAS: 0
ABDOMINAL PAIN: 0
WEAKNESS: 0
CHILLS: 0
DEPRESSION: 0
DIZZINESS: 0
SPUTUM PRODUCTION: 0
SORE THROAT: 0
SPEECH CHANGE: 0
COUGH: 0
PND: 0
NECK PAIN: 0
PHOTOPHOBIA: 0
FALLS: 0
SHORTNESS OF BREATH: 1
ORTHOPNEA: 0
DOUBLE VISION: 0
SINUS PAIN: 0
WEIGHT LOSS: 0
DIARRHEA: 0
EYE DISCHARGE: 0
NAUSEA: 0
VOMITING: 0
BLURRED VISION: 0
HEADACHES: 0
STRIDOR: 0

## 2023-01-13 NOTE — PROGRESS NOTES
Chief Complaint   Patient presents with    Follow-Up     Last seen 8/3/22          HPI: This patient is a 77 y.o. male whom is followed in our clinic for asthma and lung nodule last seen by me on 8/3/22.  He is followed by cardiology for AF, on chronic, low-dose amiodarone at 100 mg daily. Asthma is long-standing and sxs include wheezing and cough both of which were controlled on low-dose ISC/LABA with symbicort 80 w/o rescue inhaler use or tx for acute exacerbation. PFTs  in 2020 were essentially normal. The pt did have 5 mm LLL nodule on CT in 2019 for which we did a repeat CT in March 2022 and showed stable LLL nodule, stable RLL 6 mm nodule not commented on in 2019 but present when reviewed by me. Mild scarring in RLL also stable.  Updated pulmonary function testing in August 2022 showed normal airflows with normal lung volumes however there is air trapping and preserved diffusion capacity at 91% predicted.  No bronchodilator response.  at our last clinic visit pt was c/o increased SOB with activities as simple as just walking across the room w/o cough or wheezing. No CP or palpitations. HE was bradycardic. I recommended increase in ICS although was not convinced this sxs were asthma. He tried to complete treadmill stress test which was submaximal due to having to stop for GAY. HR increased to 93 and SpO2 was 93-94%. NO chest pain or ischemic changes on ECG. He felt the symbicort 160 was helpful at first but tells me today that he continues to be SOB with really any activity. Still no wheezing or cough.     Past Medical History:   Diagnosis Date    Arrhythmia     ASTHMA     Atrial fibrillation (HCC)     Back pain     Chickenpox     [place] measles     Hypertension     Peripheral vascular disease, unspecified (HCC)     Stroke (HCC) 1980    Tonsillitis     Unspecified disorder of thyroid        Social History     Socioeconomic History    Marital status:      Spouse name: Not on file    Number of children:  Not on file    Years of education: Not on file    Highest education level: Not on file   Occupational History    Not on file   Tobacco Use    Smoking status: Former     Packs/day: 1.00     Years: 20.00     Pack years: 20.00     Types: Cigarettes     Quit date: 1980     Years since quittin.0     Passive exposure: Never    Smokeless tobacco: Never   Vaping Use    Vaping Use: Never used   Substance and Sexual Activity    Alcohol use: Yes     Alcohol/week: 4.2 - 16.8 oz     Types: 7 Standard drinks or equivalent per week     Comment: 7 DRINKS PER WEEK    Drug use: Not Currently     Types: Marijuana, Oral     Comment: has not used 60 years ago     Sexual activity: Not on file   Other Topics Concern    Not on file   Social History Narrative    Not on file     Social Determinants of Health     Financial Resource Strain: Not on file   Food Insecurity: Not on file   Transportation Needs: Not on file   Physical Activity: Not on file   Stress: Not on file   Social Connections: Not on file   Intimate Partner Violence: Not on file   Housing Stability: Not on file       Family History   Problem Relation Age of Onset    Cancer Other     No Known Problems Mother     No Known Problems Father     Lung Disease Sister     Cancer Brother        Current Outpatient Medications on File Prior to Visit   Medication Sig Dispense Refill    rosuvastatin (CRESTOR) 10 MG Tab Take 1 Tablet by mouth every evening. 90 Tablet 1    amiodarone (CORDARONE) 100 MG tablet Take 1 Tablet by mouth every day. 90 Tablet 1    apixaban (ELIQUIS) 5mg Tab Take 1 Tablet by mouth 2 times a day. 180 Tablet 1    budesonide-formoterol (SYMBICORT) 160-4.5 MCG/ACT Aerosol Inhale 2 Puffs 2 times a day. Use with spacer.  Rinse mouth after each use. 3 Each 3    tamsulosin (FLOMAX) 0.4 MG capsule       albuterol 108 (90 Base) MCG/ACT Aero Soln inhalation aerosol Inhale 1-2 Puffs every four hours as needed for Shortness of Breath. 1 Each 6     "losartan-hydrochlorothiazide (HYZAAR) 50-12.5 MG per tablet Take 1 Tablet by mouth every day. 90 Tablet 7    Omega-3 Fatty Acids (FISH OIL PO) Take  by mouth.      Ferrous Sulfate (IRON) 325 (65 Fe) MG Tab Take  by mouth.      levothyroxine (SYNTHROID) 88 MCG Tab Take 88 mcg by mouth Every morning on an empty stomach.      Glucosamine-Chondroit-Vit C-Mn (GLUCOSAMINE 1500 COMPLEX) Cap Take  by mouth.       No current facility-administered medications on file prior to visit.       Tape      ROS:   Review of Systems   Constitutional:  Negative for chills, diaphoresis, fever, malaise/fatigue and weight loss.   HENT:  Negative for congestion, ear discharge, ear pain, hearing loss, nosebleeds, sinus pain, sore throat and tinnitus.    Eyes:  Negative for blurred vision, double vision, photophobia, pain, discharge and redness.   Respiratory:  Positive for shortness of breath. Negative for cough, hemoptysis, sputum production, wheezing and stridor.    Cardiovascular:  Negative for chest pain, palpitations, orthopnea, claudication, leg swelling and PND.   Gastrointestinal:  Negative for abdominal pain, constipation, diarrhea, heartburn, nausea and vomiting.   Genitourinary:  Negative for dysuria and urgency.   Musculoskeletal:  Negative for back pain, falls, joint pain, myalgias and neck pain.   Skin:  Negative for itching and rash.   Neurological:  Negative for dizziness, tremors, speech change, focal weakness, weakness and headaches.   Endo/Heme/Allergies:  Negative for environmental allergies.   Psychiatric/Behavioral:  Negative for depression.      /68 (BP Location: Left arm, Patient Position: Sitting, BP Cuff Size: Adult)   Pulse (!) 58   Resp 16   Ht 1.88 m (6' 2\")   Wt 86.2 kg (190 lb)   SpO2 99%   Physical Exam  Vitals reviewed.   Constitutional:       General: He is not in acute distress.     Appearance: Normal appearance. He is normal weight.   HENT:      Head: Normocephalic and atraumatic.      Right " Ear: External ear normal.      Left Ear: External ear normal.      Nose: Nose normal. No congestion.      Mouth/Throat:      Mouth: Mucous membranes are moist.      Pharynx: Oropharynx is clear. No oropharyngeal exudate.   Eyes:      Extraocular Movements: Extraocular movements intact.      Conjunctiva/sclera: Conjunctivae normal.      Pupils: Pupils are equal, round, and reactive to light.   Cardiovascular:      Rate and Rhythm: Normal rate. Rhythm irregular.      Heart sounds: Normal heart sounds. No murmur heard.    No gallop.   Pulmonary:      Effort: Pulmonary effort is normal. No respiratory distress.      Breath sounds: Normal breath sounds. No wheezing or rales.   Abdominal:      General: There is no distension.      Palpations: Abdomen is soft.   Musculoskeletal:         General: Normal range of motion.      Cervical back: Normal range of motion and neck supple.      Right lower leg: No edema.      Left lower leg: No edema.   Skin:     General: Skin is warm and dry.      Findings: No rash.   Neurological:      Mental Status: He is alert and oriented to person, place, and time.      Cranial Nerves: No cranial nerve deficit.   Psychiatric:         Mood and Affect: Mood normal.         Behavior: Behavior normal.       PFTs as reviewed by me personally: as per hPI    Imaging as reviewed by me personally:  as per hPI    Assessment:  1. Mild persistent asthma without complication        2. Atrial fibrillation, unspecified type (HCC)        3. On amiodarone therapy        4. Pulmonary nodules        5. SOB (shortness of breath)  PULMONARY FUNCTION TESTS -Test requested: Cardiopulmonary Exercise Testing (CPX)          Plan:  I do not think Mr. Kruger's sxs are asthma. We will continue symbicort 160 and prn ZENA but I suspect more related to AF and bradycardia. We will proceed with CPET to assist in delineating the cause of respiratory sxs  Chronic. Rate controlled. On anticoagulation  Low dose with normal  PFT/imaging  Stable; no clear indication for f/u  See above. I suspect more cardiac/decreased delivery due to relative bradycardia and AF in the setting of increased demand however we will proceed with CPET to ensure not a ventilatory issue  Return in about 4 months (around 5/12/2023) for CPET .

## 2023-01-16 ENCOUNTER — PATIENT MESSAGE (OUTPATIENT)
Dept: SLEEP MEDICINE | Facility: MEDICAL CENTER | Age: 78
End: 2023-01-16
Payer: MEDICARE

## 2023-01-16 RX ORDER — BUDESONIDE AND FORMOTEROL FUMARATE DIHYDRATE 160; 4.5 UG/1; UG/1
2 AEROSOL RESPIRATORY (INHALATION) 2 TIMES DAILY
Qty: 3 EACH | Refills: 3 | Status: SHIPPED | OUTPATIENT
Start: 2023-01-16 | End: 2023-01-24 | Stop reason: SDUPTHER

## 2023-01-16 RX ORDER — ALBUTEROL SULFATE 90 UG/1
1-2 AEROSOL, METERED RESPIRATORY (INHALATION) EVERY 4 HOURS PRN
Qty: 1 EACH | Refills: 6 | Status: SHIPPED | OUTPATIENT
Start: 2023-01-16 | End: 2023-01-24 | Stop reason: SDUPTHER

## 2023-01-23 ENCOUNTER — PATIENT MESSAGE (OUTPATIENT)
Dept: SLEEP MEDICINE | Facility: MEDICAL CENTER | Age: 78
End: 2023-01-23
Payer: MEDICARE

## 2023-01-24 RX ORDER — ALBUTEROL SULFATE 90 UG/1
1-2 AEROSOL, METERED RESPIRATORY (INHALATION) EVERY 4 HOURS PRN
Qty: 1 EACH | Refills: 6 | Status: ON HOLD | OUTPATIENT
Start: 2023-01-24 | End: 2023-04-21

## 2023-01-24 RX ORDER — BUDESONIDE AND FORMOTEROL FUMARATE DIHYDRATE 160; 4.5 UG/1; UG/1
2 AEROSOL RESPIRATORY (INHALATION) 2 TIMES DAILY
Qty: 3 EACH | Refills: 3 | Status: SHIPPED | OUTPATIENT
Start: 2023-01-24 | End: 2024-01-16 | Stop reason: SDUPTHER

## 2023-02-07 ENCOUNTER — HOSPITAL ENCOUNTER (OUTPATIENT)
Dept: PULMONOLOGY | Facility: MEDICAL CENTER | Age: 78
End: 2023-02-07
Attending: INTERNAL MEDICINE
Payer: MEDICARE

## 2023-02-07 DIAGNOSIS — R06.02 SOB (SHORTNESS OF BREATH): ICD-10-CM

## 2023-02-07 PROCEDURE — 94621 CARDIOPULM EXERCISE TESTING: CPT

## 2023-02-07 PROCEDURE — 94621 CARDIOPULM EXERCISE TESTING: CPT | Mod: 26 | Performed by: INTERNAL MEDICINE

## 2023-02-17 NOTE — PROCEDURES
DATE OF SERVICE:  02/07/2023     CARDIOPULMONARY STRESS TEST     REFERRING PHYSICIAN:  Barb Mix MD     INTERPRETING PHYSICIAN:  Bety Barros MD     REASON FOR STUDY:  Shortness of breath.     ALLERGIES:  None significant.     PAST MEDICAL HISTORY:  AFib, peripheral vascular disease, asthma.     PAST SURGICAL HISTORY:  L1, L4, L5 fusion, C1, C2, C3 fusion.     MEDICATIONS:  Symbicort, albuterol, rosuvastatin, amiodarone, tamsulosin,   Hyzaar.     SOCIAL HISTORY:  Former smoker.     FAMILY HISTORY:  Noncontributory.     RESULTS:  The maximal oxygen uptake is decreased at 1493 mL per minute (17.3   mL/kg per minute) or 71% of maximum predicted (predicted maximum 2115 mL per   minute).  Heart rate is 54 at rest and increases shallowly to 96 beats per   minute at maximal exercise, which is only 67% of maximum predicted.  Heart   rate reserve is 48 beats per minute.  Blood pressure is 101/66 at the start of   the test and does not increase appropriately and rather decreases end   diastolic value to 128/54 at maximum exertion.  EKG shows sinus rhythm   throughout the test.  There are no EKG pattern suggestive of ischemia.  O2   pulse is 4 at the start of the test and increases adequately to 15 during   exercise.  Minute ventilation is 9.5 at rest and increases to 87.1 at 110   davis or 66% of maximum voluntary ventilation.  Breathing reserve is 58.    Analysis of breathing pattern shows an appropriate increase in tidal volume   compared to increase in respiratory rate.  RER at peak exercise is 1.06.    Oxygen saturation is uninterpretable due to having reported cold fingers   during the test.  An anaerobic threshold is detected at 110 davis by V-slope   technique or 61% of maximum predicted VO2.     INTERPRETATION:  This is not a maximal study as the VO2 max was not reached.    Cardiovascular abnormalities include a blunted heart rate and a blood pressure   response; however, the O2 pulse is normal,  suggesting a normal stroke volume.    His diastolic blood pressure decreases significantly with exercise.  No   significant ventilatory abnormalities are seen.  Gas exchange abnormalities   are difficult to conclude as SpO2 varied erratically during the test and are   likely not reflecting the patient's actual oxygen saturations.        ______________________________  MD LACY Teague/ISRRAEL    DD:  02/16/2023 13:48  DT:  02/16/2023 17:17    Job#:  [id number]

## 2023-03-06 ENCOUNTER — PATIENT MESSAGE (OUTPATIENT)
Dept: SLEEP MEDICINE | Facility: MEDICAL CENTER | Age: 78
End: 2023-03-06
Payer: MEDICARE

## 2023-03-06 ENCOUNTER — PRE-ADMISSION TESTING (OUTPATIENT)
Dept: ADMISSIONS | Facility: MEDICAL CENTER | Age: 78
End: 2023-03-06
Attending: OPHTHALMOLOGY
Payer: MEDICARE

## 2023-03-06 NOTE — OR NURSING
Tele PAT completed with patient.  Patient reports he lives out of town and is unable to come in prior to surgery date for lab work and EKG.

## 2023-03-08 ENCOUNTER — OFFICE VISIT (OUTPATIENT)
Dept: CARDIOLOGY | Facility: MEDICAL CENTER | Age: 78
End: 2023-03-08
Payer: MEDICARE

## 2023-03-08 ENCOUNTER — HOSPITAL ENCOUNTER (OUTPATIENT)
Facility: MEDICAL CENTER | Age: 78
End: 2023-03-08
Attending: OPHTHALMOLOGY | Admitting: OPHTHALMOLOGY
Payer: MEDICARE

## 2023-03-08 VITALS
RESPIRATION RATE: 16 BRPM | OXYGEN SATURATION: 96 % | HEIGHT: 74 IN | HEART RATE: 94 BPM | SYSTOLIC BLOOD PRESSURE: 116 MMHG | WEIGHT: 192 LBS | DIASTOLIC BLOOD PRESSURE: 62 MMHG | BODY MASS INDEX: 24.64 KG/M2

## 2023-03-08 VITALS
TEMPERATURE: 96.9 F | BODY MASS INDEX: 24.7 KG/M2 | SYSTOLIC BLOOD PRESSURE: 126 MMHG | DIASTOLIC BLOOD PRESSURE: 71 MMHG | HEIGHT: 74 IN | WEIGHT: 192.46 LBS | HEART RATE: 59 BPM | RESPIRATION RATE: 16 BRPM | OXYGEN SATURATION: 97 %

## 2023-03-08 DIAGNOSIS — Z79.899 HIGH RISK MEDICATION USE: ICD-10-CM

## 2023-03-08 DIAGNOSIS — I48.0 PAROXYSMAL ATRIAL FIBRILLATION (HCC): ICD-10-CM

## 2023-03-08 DIAGNOSIS — D68.69 HYPERCOAGULABLE STATE DUE TO ATRIAL FIBRILLATION (HCC): ICD-10-CM

## 2023-03-08 DIAGNOSIS — I44.0 FIRST DEGREE AV BLOCK: ICD-10-CM

## 2023-03-08 DIAGNOSIS — I45.89 CHRONOTROPIC INCOMPETENCE: ICD-10-CM

## 2023-03-08 DIAGNOSIS — E78.5 DYSLIPIDEMIA: ICD-10-CM

## 2023-03-08 DIAGNOSIS — I10 ESSENTIAL HYPERTENSION: ICD-10-CM

## 2023-03-08 DIAGNOSIS — I48.91 HYPERCOAGULABLE STATE DUE TO ATRIAL FIBRILLATION (HCC): ICD-10-CM

## 2023-03-08 DIAGNOSIS — Z79.01 CHRONIC ANTICOAGULATION: ICD-10-CM

## 2023-03-08 DIAGNOSIS — R06.09 DYSPNEA ON EXERTION: ICD-10-CM

## 2023-03-08 LAB
ANION GAP SERPL CALC-SCNC: 11 MMOL/L (ref 7–16)
BUN SERPL-MCNC: 23 MG/DL (ref 8–22)
CALCIUM SERPL-MCNC: 8.8 MG/DL (ref 8.5–10.5)
CHLORIDE SERPL-SCNC: 104 MMOL/L (ref 96–112)
CO2 SERPL-SCNC: 23 MMOL/L (ref 20–33)
CREAT SERPL-MCNC: 1.24 MG/DL (ref 0.5–1.4)
EKG IMPRESSION: NORMAL
ERYTHROCYTE [DISTWIDTH] IN BLOOD BY AUTOMATED COUNT: 44.6 FL (ref 35.9–50)
GFR SERPLBLD CREATININE-BSD FMLA CKD-EPI: 60 ML/MIN/1.73 M 2
GLUCOSE SERPL-MCNC: 99 MG/DL (ref 65–99)
HCT VFR BLD AUTO: 40 % (ref 42–52)
HGB BLD-MCNC: 14 G/DL (ref 14–18)
MCH RBC QN AUTO: 32 PG (ref 27–33)
MCHC RBC AUTO-ENTMCNC: 35 G/DL (ref 33.7–35.3)
MCV RBC AUTO: 91.3 FL (ref 81.4–97.8)
PLATELET # BLD AUTO: 277 K/UL (ref 164–446)
PMV BLD AUTO: 10 FL (ref 9–12.9)
POTASSIUM SERPL-SCNC: 4.6 MMOL/L (ref 3.6–5.5)
RBC # BLD AUTO: 4.38 M/UL (ref 4.7–6.1)
SODIUM SERPL-SCNC: 138 MMOL/L (ref 135–145)
WBC # BLD AUTO: 6.8 K/UL (ref 4.8–10.8)

## 2023-03-08 PROCEDURE — 93010 ELECTROCARDIOGRAM REPORT: CPT | Performed by: INTERNAL MEDICINE

## 2023-03-08 PROCEDURE — 700101 HCHG RX REV CODE 250: Performed by: OPHTHALMOLOGY

## 2023-03-08 PROCEDURE — 93000 ELECTROCARDIOGRAM COMPLETE: CPT | Performed by: INTERNAL MEDICINE

## 2023-03-08 PROCEDURE — 93005 ELECTROCARDIOGRAM TRACING: CPT | Performed by: ANESTHESIOLOGY

## 2023-03-08 PROCEDURE — 93010 ELECTROCARDIOGRAM REPORT: CPT | Mod: 76 | Performed by: INTERNAL MEDICINE

## 2023-03-08 PROCEDURE — 85027 COMPLETE CBC AUTOMATED: CPT

## 2023-03-08 PROCEDURE — 93005 ELECTROCARDIOGRAM TRACING: CPT | Performed by: OTOLARYNGOLOGY

## 2023-03-08 PROCEDURE — 80048 BASIC METABOLIC PNL TOTAL CA: CPT

## 2023-03-08 PROCEDURE — 99215 OFFICE O/P EST HI 40 MIN: CPT | Mod: 57 | Performed by: INTERNAL MEDICINE

## 2023-03-08 RX ORDER — PHENYLEPHRINE HYDROCHLORIDE 25 MG/ML
SOLUTION/ DROPS OPHTHALMIC
Status: DISCONTINUED
Start: 2023-03-08 | End: 2023-03-08 | Stop reason: HOSPADM

## 2023-03-08 RX ORDER — CYCLOPENTOLATE HYDROCHLORIDE 10 MG/ML
1 SOLUTION/ DROPS OPHTHALMIC ONCE
Status: COMPLETED | OUTPATIENT
Start: 2023-03-08 | End: 2023-03-08

## 2023-03-08 RX ORDER — SODIUM CHLORIDE, SODIUM LACTATE, POTASSIUM CHLORIDE, CALCIUM CHLORIDE 600; 310; 30; 20 MG/100ML; MG/100ML; MG/100ML; MG/100ML
INJECTION, SOLUTION INTRAVENOUS CONTINUOUS
Status: CANCELLED | OUTPATIENT
Start: 2023-03-08 | End: 2023-03-08

## 2023-03-08 RX ORDER — CYCLOPENTOLATE HYDROCHLORIDE 10 MG/ML
SOLUTION/ DROPS OPHTHALMIC
Status: DISCONTINUED
Start: 2023-03-08 | End: 2023-03-08 | Stop reason: HOSPADM

## 2023-03-08 RX ORDER — PHENYLEPHRINE HYDROCHLORIDE 25 MG/ML
1 SOLUTION/ DROPS OPHTHALMIC
Status: COMPLETED | OUTPATIENT
Start: 2023-03-08 | End: 2023-03-08

## 2023-03-08 RX ADMIN — CYCLOPENTOLATE HYDROCHLORIDE 1 DROP: 10 SOLUTION OPHTHALMIC at 13:50

## 2023-03-08 RX ADMIN — PHENYLEPHRINE HYDROCHLORIDE 1 DROP: 25 SOLUTION/ DROPS OPHTHALMIC at 13:50

## 2023-03-08 ASSESSMENT — FIBROSIS 4 INDEX
FIB4 SCORE: 0.86
FIB4 SCORE: 1

## 2023-03-08 NOTE — OR NURSING
1445 Pt cancelled in pre op per anesthesia due to new first degree heart block noted on EKG.  Pt and wife will follow up with cardiologist upon discharge.

## 2023-03-09 ENCOUNTER — HOSPITAL ENCOUNTER (OUTPATIENT)
Facility: MEDICAL CENTER | Age: 78
End: 2023-03-09
Attending: EMERGENCY MEDICINE | Admitting: INTERNAL MEDICINE
Payer: MEDICARE

## 2023-03-09 ENCOUNTER — APPOINTMENT (OUTPATIENT)
Dept: CARDIOLOGY | Facility: MEDICAL CENTER | Age: 78
End: 2023-03-09
Attending: NURSE PRACTITIONER
Payer: MEDICARE

## 2023-03-09 ENCOUNTER — APPOINTMENT (OUTPATIENT)
Dept: RADIOLOGY | Facility: MEDICAL CENTER | Age: 78
End: 2023-03-09
Attending: EMERGENCY MEDICINE
Payer: MEDICARE

## 2023-03-09 ENCOUNTER — APPOINTMENT (OUTPATIENT)
Dept: RADIOLOGY | Facility: MEDICAL CENTER | Age: 78
End: 2023-03-09
Attending: INTERNAL MEDICINE
Payer: MEDICARE

## 2023-03-09 VITALS
HEART RATE: 51 BPM | BODY MASS INDEX: 24.47 KG/M2 | TEMPERATURE: 97.2 F | SYSTOLIC BLOOD PRESSURE: 117 MMHG | RESPIRATION RATE: 18 BRPM | HEIGHT: 74 IN | OXYGEN SATURATION: 96 % | WEIGHT: 190.7 LBS | DIASTOLIC BLOOD PRESSURE: 73 MMHG

## 2023-03-09 DIAGNOSIS — Z95.0 S/P PLACEMENT OF CARDIAC PACEMAKER: ICD-10-CM

## 2023-03-09 DIAGNOSIS — I45.9 HEART BLOCK: ICD-10-CM

## 2023-03-09 PROBLEM — R00.1 SYMPTOMATIC BRADYCARDIA: Status: ACTIVE | Noted: 2023-03-09

## 2023-03-09 LAB
ALBUMIN SERPL BCP-MCNC: 3.9 G/DL (ref 3.2–4.9)
ALBUMIN/GLOB SERPL: 1.6 G/DL
ALP SERPL-CCNC: 63 U/L (ref 30–99)
ALT SERPL-CCNC: 21 U/L (ref 2–50)
ANION GAP SERPL CALC-SCNC: 13 MMOL/L (ref 7–16)
AST SERPL-CCNC: 21 U/L (ref 12–45)
BASOPHILS # BLD AUTO: 0.9 % (ref 0–1.8)
BASOPHILS # BLD: 0.06 K/UL (ref 0–0.12)
BILIRUB SERPL-MCNC: 1.4 MG/DL (ref 0.1–1.5)
BUN SERPL-MCNC: 20 MG/DL (ref 8–22)
CALCIUM ALBUM COR SERPL-MCNC: 9 MG/DL (ref 8.5–10.5)
CALCIUM SERPL-MCNC: 8.9 MG/DL (ref 8.5–10.5)
CHLORIDE SERPL-SCNC: 104 MMOL/L (ref 96–112)
CO2 SERPL-SCNC: 22 MMOL/L (ref 20–33)
CREAT SERPL-MCNC: 1.36 MG/DL (ref 0.5–1.4)
EKG IMPRESSION: NORMAL
EKG IMPRESSION: NORMAL
EOSINOPHIL # BLD AUTO: 0.18 K/UL (ref 0–0.51)
EOSINOPHIL NFR BLD: 2.7 % (ref 0–6.9)
ERYTHROCYTE [DISTWIDTH] IN BLOOD BY AUTOMATED COUNT: 46 FL (ref 35.9–50)
GFR SERPLBLD CREATININE-BSD FMLA CKD-EPI: 54 ML/MIN/1.73 M 2
GLOBULIN SER CALC-MCNC: 2.5 G/DL (ref 1.9–3.5)
GLUCOSE SERPL-MCNC: 107 MG/DL (ref 65–99)
HCT VFR BLD AUTO: 43.7 % (ref 42–52)
HGB BLD-MCNC: 14.8 G/DL (ref 14–18)
IMM GRANULOCYTES # BLD AUTO: 0.03 K/UL (ref 0–0.11)
IMM GRANULOCYTES NFR BLD AUTO: 0.4 % (ref 0–0.9)
LYMPHOCYTES # BLD AUTO: 1.41 K/UL (ref 1–4.8)
LYMPHOCYTES NFR BLD: 21 % (ref 22–41)
MCH RBC QN AUTO: 31.8 PG (ref 27–33)
MCHC RBC AUTO-ENTMCNC: 33.9 G/DL (ref 33.7–35.3)
MCV RBC AUTO: 94 FL (ref 81.4–97.8)
MONOCYTES # BLD AUTO: 0.44 K/UL (ref 0–0.85)
MONOCYTES NFR BLD AUTO: 6.6 % (ref 0–13.4)
NEUTROPHILS # BLD AUTO: 4.58 K/UL (ref 1.82–7.42)
NEUTROPHILS NFR BLD: 68.4 % (ref 44–72)
NRBC # BLD AUTO: 0 K/UL
NRBC BLD-RTO: 0 /100 WBC
PLATELET # BLD AUTO: 285 K/UL (ref 164–446)
PMV BLD AUTO: 9.5 FL (ref 9–12.9)
POTASSIUM SERPL-SCNC: 3.9 MMOL/L (ref 3.6–5.5)
PROT SERPL-MCNC: 6.4 G/DL (ref 6–8.2)
RBC # BLD AUTO: 4.65 M/UL (ref 4.7–6.1)
SODIUM SERPL-SCNC: 139 MMOL/L (ref 135–145)
WBC # BLD AUTO: 6.7 K/UL (ref 4.8–10.8)

## 2023-03-09 PROCEDURE — 700102 HCHG RX REV CODE 250 W/ 637 OVERRIDE(OP): Performed by: NURSE PRACTITIONER

## 2023-03-09 PROCEDURE — 93005 ELECTROCARDIOGRAM TRACING: CPT | Performed by: EMERGENCY MEDICINE

## 2023-03-09 PROCEDURE — 33208 INSRT HEART PM ATRIAL & VENT: CPT | Mod: KX | Performed by: INTERNAL MEDICINE

## 2023-03-09 PROCEDURE — 160002 HCHG RECOVERY MINUTES (STAT)

## 2023-03-09 PROCEDURE — 700102 HCHG RX REV CODE 250 W/ 637 OVERRIDE(OP): Performed by: INTERNAL MEDICINE

## 2023-03-09 PROCEDURE — 93005 ELECTROCARDIOGRAM TRACING: CPT

## 2023-03-09 PROCEDURE — 700105 HCHG RX REV CODE 258: Performed by: NURSE PRACTITIONER

## 2023-03-09 PROCEDURE — 85025 COMPLETE CBC W/AUTO DIFF WBC: CPT

## 2023-03-09 PROCEDURE — 700111 HCHG RX REV CODE 636 W/ 250 OVERRIDE (IP)

## 2023-03-09 PROCEDURE — 99152 MOD SED SAME PHYS/QHP 5/>YRS: CPT | Performed by: INTERNAL MEDICINE

## 2023-03-09 PROCEDURE — 71045 X-RAY EXAM CHEST 1 VIEW: CPT

## 2023-03-09 PROCEDURE — 99291 CRITICAL CARE FIRST HOUR: CPT

## 2023-03-09 PROCEDURE — 700101 HCHG RX REV CODE 250

## 2023-03-09 PROCEDURE — G0378 HOSPITAL OBSERVATION PER HR: HCPCS

## 2023-03-09 PROCEDURE — 93005 ELECTROCARDIOGRAM TRACING: CPT | Performed by: INTERNAL MEDICINE

## 2023-03-09 PROCEDURE — C1898 LEAD, PMKR, OTHER THAN TRANS: HCPCS

## 2023-03-09 PROCEDURE — 36415 COLL VENOUS BLD VENIPUNCTURE: CPT

## 2023-03-09 PROCEDURE — A9270 NON-COVERED ITEM OR SERVICE: HCPCS | Performed by: INTERNAL MEDICINE

## 2023-03-09 PROCEDURE — 160035 HCHG PACU - 1ST 60 MINS PHASE I

## 2023-03-09 PROCEDURE — A9270 NON-COVERED ITEM OR SERVICE: HCPCS | Performed by: NURSE PRACTITIONER

## 2023-03-09 PROCEDURE — 80053 COMPREHEN METABOLIC PANEL: CPT

## 2023-03-09 PROCEDURE — 93010 ELECTROCARDIOGRAM REPORT: CPT | Performed by: INTERNAL MEDICINE

## 2023-03-09 RX ORDER — ACETAMINOPHEN 325 MG/1
650 TABLET ORAL EVERY 4 HOURS PRN
Status: DISCONTINUED | OUTPATIENT
Start: 2023-03-09 | End: 2023-03-09 | Stop reason: HOSPADM

## 2023-03-09 RX ORDER — AMIODARONE HYDROCHLORIDE 200 MG/1
100 TABLET ORAL DAILY
Status: DISCONTINUED | OUTPATIENT
Start: 2023-03-09 | End: 2023-03-09

## 2023-03-09 RX ORDER — ONDANSETRON 2 MG/ML
4 INJECTION INTRAMUSCULAR; INTRAVENOUS EVERY 6 HOURS PRN
Status: DISCONTINUED | OUTPATIENT
Start: 2023-03-09 | End: 2023-03-09 | Stop reason: HOSPADM

## 2023-03-09 RX ORDER — MIDAZOLAM HYDROCHLORIDE 1 MG/ML
INJECTION INTRAMUSCULAR; INTRAVENOUS
Status: COMPLETED
Start: 2023-03-09 | End: 2023-03-09

## 2023-03-09 RX ORDER — BUPIVACAINE HYDROCHLORIDE 5 MG/ML
INJECTION, SOLUTION EPIDURAL; INTRACAUDAL
Status: COMPLETED
Start: 2023-03-09 | End: 2023-03-09

## 2023-03-09 RX ORDER — CEFAZOLIN SODIUM 1 G/3ML
INJECTION, POWDER, FOR SOLUTION INTRAMUSCULAR; INTRAVENOUS
Status: COMPLETED
Start: 2023-03-09 | End: 2023-03-09

## 2023-03-09 RX ORDER — CYCLOBENZAPRINE HCL 10 MG
5 TABLET ORAL 3 TIMES DAILY PRN
Status: DISCONTINUED | OUTPATIENT
Start: 2023-03-09 | End: 2023-03-09 | Stop reason: HOSPADM

## 2023-03-09 RX ORDER — SODIUM CHLORIDE 9 MG/ML
1000 INJECTION, SOLUTION INTRAVENOUS ONCE
Status: COMPLETED | OUTPATIENT
Start: 2023-03-09 | End: 2023-03-09

## 2023-03-09 RX ORDER — LOSARTAN POTASSIUM AND HYDROCHLOROTHIAZIDE 12.5; 5 MG/1; MG/1
1 TABLET ORAL DAILY
Status: DISCONTINUED | OUTPATIENT
Start: 2023-03-09 | End: 2023-03-09

## 2023-03-09 RX ORDER — DOXYCYCLINE 100 MG/1
100 CAPSULE ORAL 2 TIMES DAILY
Qty: 10 CAPSULE | Refills: 0 | Status: ACTIVE | OUTPATIENT
Start: 2023-03-09 | End: 2023-03-28

## 2023-03-09 RX ORDER — AMIODARONE HYDROCHLORIDE 100 MG/1
100 TABLET ORAL DAILY
Status: ON HOLD | COMMUNITY
End: 2023-03-09

## 2023-03-09 RX ORDER — LIDOCAINE HYDROCHLORIDE 20 MG/ML
INJECTION, SOLUTION INFILTRATION; PERINEURAL
Status: COMPLETED
Start: 2023-03-09 | End: 2023-03-09

## 2023-03-09 RX ORDER — LEVOTHYROXINE SODIUM 88 UG/1
88 TABLET ORAL
Status: DISCONTINUED | OUTPATIENT
Start: 2023-03-09 | End: 2023-03-09 | Stop reason: HOSPADM

## 2023-03-09 RX ORDER — HYDROCHLOROTHIAZIDE 25 MG/1
12.5 TABLET ORAL
Status: DISCONTINUED | OUTPATIENT
Start: 2023-03-10 | End: 2023-03-09 | Stop reason: HOSPADM

## 2023-03-09 RX ORDER — LOSARTAN POTASSIUM 50 MG/1
50 TABLET ORAL DAILY
Status: DISCONTINUED | OUTPATIENT
Start: 2023-03-10 | End: 2023-03-09 | Stop reason: HOSPADM

## 2023-03-09 RX ADMIN — CYCLOBENZAPRINE 5 MG: 10 TABLET, FILM COATED ORAL at 13:50

## 2023-03-09 RX ADMIN — ACETAMINOPHEN 650 MG: 325 TABLET, FILM COATED ORAL at 13:10

## 2023-03-09 RX ADMIN — MIDAZOLAM HYDROCHLORIDE 2 MG: 1 INJECTION, SOLUTION INTRAMUSCULAR; INTRAVENOUS at 10:22

## 2023-03-09 RX ADMIN — MIDAZOLAM HYDROCHLORIDE 1 MG: 1 INJECTION, SOLUTION INTRAMUSCULAR; INTRAVENOUS at 10:44

## 2023-03-09 RX ADMIN — CEFAZOLIN 3000 MG: 330 INJECTION, POWDER, FOR SOLUTION INTRAMUSCULAR; INTRAVENOUS at 09:50

## 2023-03-09 RX ADMIN — BUPIVACAINE HYDROCHLORIDE: 5 INJECTION, SOLUTION EPIDURAL; INTRACAUDAL; PERINEURAL at 09:50

## 2023-03-09 RX ADMIN — FENTANYL CITRATE 100 MCG: 50 INJECTION, SOLUTION INTRAMUSCULAR; INTRAVENOUS at 10:22

## 2023-03-09 RX ADMIN — LIDOCAINE HYDROCHLORIDE: 20 INJECTION, SOLUTION INFILTRATION; PERINEURAL at 09:50

## 2023-03-09 RX ADMIN — SODIUM CHLORIDE 1000 ML: 9 INJECTION, SOLUTION INTRAVENOUS at 13:49

## 2023-03-09 ASSESSMENT — PAIN DESCRIPTION - PAIN TYPE
TYPE: SURGICAL PAIN

## 2023-03-09 ASSESSMENT — FIBROSIS 4 INDEX: FIB4 SCORE: 1

## 2023-03-09 NOTE — CARE PLAN
The patient is Stable - Low risk of patient condition declining or worsening    Shift Goals  Clinical Goals: Pain management, monitor incision, Medicate per MAR, Stable VS  Patient Goals: Pain control, D/C  Family Goals: Pain control and rest      Problem: Knowledge Deficit - Standard  Goal: Patient and family/care givers will demonstrate understanding of plan of care, disease process/condition, diagnostic tests and medications  Outcome: Progressing  Note: Patient educated on plan and goals of care and disease process. Education provided on medications, procedures, and equipment. Will continue to re-inforce education when required. All questions and concerns answered at this time.      Problem: Pain - Standard  Goal: Alleviation of pain or a reduction in pain to the patient’s comfort goal  Outcome: Progressing  Note: Educated patient on the use of 1-10 pain scale and use of pain descriptors. Administered pain medication when needed per MAR. Non-pharmacological methods for pain control in place such as rest, repositioning, and enforcing a calm and conductive environment.           Pediatric Urology  Pediatric Urology  [address] [place] [address] [address]  [place] [address]  Phone: [phone]  Fax: [phone]

## 2023-03-09 NOTE — DISCHARGE SUMMARY
Discharge Summary    CHIEF COMPLAINT ON ADMISSION  Chief Complaint   Patient presents with    Shortness of Breath     SOB worsening in the past 2 days even when resting. Hx of afib. Denies chest pain.       Reason for Admission  SOB, Sent by md      Admission Date  3/9/2023    CODE STATUS  Full Code    HPI & HOSPITAL COURSE  This is a 77 y.o. male sent to the ER for PPM implantation by Dr Munoz due to symptomatic AV block. Patient underwent successful SJM dual chamber PPM implantation with Dr. Cerna this am.     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     Post PPM implantation patient recovered well. PPM site remained soft with CDI dressing in place. Post PPM CXR showed no evidence of PTX. Device interrogation showed normal sensing and function.    Patient reported feeling well. Ambulated up in the hallway without difficulty. Post PPM discharge instructions were discussed with patient and his wife, all questions were answered.     Therefore, he is discharged in good and stable condition to home with close outpatient follow-up listed below.      FOLLOW UP  Future Appointments   Date Time Provider Department Center   3/17/2023  3:15 PM PACER CHECK-[facility] None   3/28/2023  9:00 AM Epifanio Munoz M.D. [facility]   6/14/2023  1:50 PM Barb Mix M.D. [facility] None     MEDICATIONS ON DISCHARGE     Medication List        START taking these medications        Instructions   doxycycline 100 MG capsule  Commonly known as: MONODOX   Take 1 Capsule by mouth 2 times a day.  Dose: 100 mg            CONTINUE taking these medications        Instructions   albuterol 108 (90 Base) MCG/ACT Aers inhalation aerosol   Inhale 1-2 Puffs every four hours as needed for Shortness of Breath.  Dose: 1-2 Puff     apixaban 5mg Tabs  Commonly known as: ELIQUIS   Take  1 Tablet by mouth 2 times a day.  Dose: 5 mg     budesonide-formoterol 160-4.5 MCG/ACT Aero  Commonly known as: Symbicort   Doctor's comments: 3 month supply  Inhale 2 Puffs 2 times a day. Use with spacer.  Rinse mouth after each use.  Dose: 2 Puff     FISH OIL PO   Take 1 Capsule by mouth every day.  Dose: 1 Capsule     Glucosamine 1500 Complex Caps   Take 1 Capsule by mouth every day.  Dose: 1 Capsule     Iron 325 (65 Fe) MG Tabs   Take 325 mg by mouth every day.  Dose: 325 mg     levothyroxine 88 MCG Tabs  Commonly known as: SYNTHROID   Take 88 mcg by mouth Every morning on an empty stomach.  Dose: 88 mcg     losartan-hydrochlorothiazide 50-12.5 MG per tablet  Commonly known as: HYZAAR   Take 1 Tablet by mouth every day.  Dose: 1 Tablet     rosuvastatin 10 MG Tabs  Commonly known as: CRESTOR   Take 1 Tablet by mouth every evening.  Dose: 10 mg     tamsulosin 0.4 MG capsule  Commonly known as: FLOMAX   Take 0.4 mg by mouth every day.  Dose: 0.4 mg            STOP taking these medications      amiodarone 100 MG tablet  Commonly known as: Cordarone            Pacemaker Discharge Instructions/[facility]    1.  No showers until seen in follow up; may take sponge bath.  Keep dressing dry & in place until seen at for you follow up visit at the cardiology office.     2.  No lifting over 10 lbs with affected arm for six weeks.  3.  Do not raise affected arm above shoulder level or behind head for six weeks.  4.  Avoid excessive pushing, pulling, or twisting for six weeks.  5.  No driving for the first week.  6.  Call our office ([phone]) if you notice any increased swelling, redness, warmth, or drainage at the implant site.  7.  Needs to be seen in emergency if you develop fever > 101F or uncontrolled pain.  8.  Always check with device clinic before any planned MRI to see if device is MRI compatible.  9.  No routine dental work or cleanings for 3 months.  10.  May remove arm sling after one day, but please  wear if you have trouble remembering to keep your arm down.  Please wear at night as a reminder.   11. Do not place cell phones or mobile devices directly over implanted device.

## 2023-03-09 NOTE — ED TRIAGE NOTES
"Chief Complaint   Patient presents with    Shortness of Breath     SOB worsening in the past 2 days even when resting. Hx of afib. Denies chest pain.     Pt supposed to have eye surgery yesterday but d/t abnormal ekg, surgery was cancelled. Per pt, his Dr wants him to have a pacemaker placed today.    /64   Pulse 61   Temp 36.3 °C (97.4 °F) (Temporal)   Resp 17   Ht 1.88 m (6' 2\")   Wt 86.5 kg (190 lb 11.2 oz)   SpO2 99%   BMI 24.48 kg/m²     "

## 2023-03-09 NOTE — ED NOTES
Med Rec completed per patient   Allergies reviewed  No ORAL antibiotics in last 30 days    Patient states that he was told by his doctor to STOP taking Amiodarone yesterday (3/8/2023)

## 2023-03-09 NOTE — OR NURSING
Patient arrived to PACU from OR in stable condition. Report received from anesthesia and OR RN at 1123. VSS and initial assessment complete.    1145 chest xray done at bedside.  1200 Report to floor. [facility] [employer] rep at bedside for interrogation.  Patient discharged to [facility] in stable condition, consistent with preoperative status.

## 2023-03-09 NOTE — OP REPORT
[facility]     PROCEDURE PERFORMED: Permanent Pacemaker Implantation    DATE OF SERVICE: 3/9/2023    : Zoya Cerna MD    ASSISTANT: None    ANESTHESIA: Local and moderate sedation (start time 1007, stop time 1102, total dose given 3 mg IV versed, 100 mcg IV fentanyl)    EBL: 30 cc    SPECIMENS: None    INDICATION(S):  Symptomatic first degree AV block    DESCRIPTION OF PROCEDURE:  After informed written consent, the patient was brought to the electrophysiology lab in the fasting, unsedated state. The patient was prepped and draped in the usual sterile fashion. The procedure was performed under moderate sedation with local anesthetic. A left upper extremity venogram was performed, demonstrating a patent subclavian vein. A left infraclavicular incision was made with a scalpel and the pectoral device pocket was created using a combination of blunt dissection and electrocautery. The modified Seldinger technique was used to gain access to the left axillary vein. A peel-away hemostasis sheath was placed in the vein. Under fluoroscopic guidance, the pacemaker leads were introduced into the heart. The ventricular lead was advanced to the RVOT and then lowered into position at the RV apex. The RV lead had to be repositioned higher on the septum due to initially poor lead parameters. The atrial lead was positioned on R atrial appendage. The leads were tested and had satisfactory sensing and pacing parameters. High output ventricular pacing did not produce extracardiac stimulation. The leads were sutured to the underlying pectoral muscle with interrupted silk over a silastic suture sleeve. The device pocket was irrigated with antibiotic solution, inspected, and no bleeding was seen. The leads were connected to the pacemaker pulse generator and the device was inserted into the pocket. The wound was closed with three layers of absorbable sutures. Following recovery from sedation, the patient was transferred to a  monitored bed in good condition.     IMPLANTED DEVICE INFORMATION:  Pulse generator is a SJM model [id number]  Serial # [id number]    LEAD INFORMATION:  1)Right atrial lead is a SJM model #[id number] , serial #[id number], P wave 1.9 millivolts, threshold 1.0 Volts at 0.5 milliseconds, pacing impedance 640 Ohms.    2)Right ventricular lead is a SJM model #[id number]/58 , serial #[id number] ,R wave 2.5 millivolts, threshold 1.0 Volts at 0.5 milliseconds, pacing impedance 900 Ohms.    DEVICE PROGRAMMING:  DDD 60 -130 ppm    FLUOROSCOPY TIME: 0.7 minutes    IMPRESSIONS:  1. Successful dual chamber pacemaker implantation    RECOMMENDATIONS:  1. Transfer to monitored bed  2. Chest x-ray  3. Device interrogation prior to hospital discharge  4. Followup in device clinic

## 2023-03-09 NOTE — ED PROVIDER NOTES
ER Provider Note    Scribed for Amilcar Laguna M.d. by Madison Sy. 3/9/2023  9:16 AM    Primary Care Provider: Gerald RAGSDALE M.D.    CHIEF COMPLAINT  Chief Complaint   Patient presents with    Shortness of Breath     SOB worsening in the past 2 days even when resting. Hx of afib. Denies chest pain.     EXTERNAL RECORDS REVIEWED  Outpatient Notes Patient was seen by cardiology yesterday and was recommended to have a pacemaker placed.    HPI/ROS  LIMITATION TO HISTORY   Select: : None  OUTSIDE HISTORIAN(S):  none    Edmary Kruger is a 77 y.o. male who presents to the ED complaining of shortness of breath onset 2 days ago. Patient shortness of breath is exacerbated with exertion, but is present at rest as well. Denies chest pain. Patient has a history of A fib and follows with cardiology. He was last seen by cardiology yesterday and was told to come to the ED for admission for a pacemaker.      PAST MEDICAL HISTORY  Past Medical History:   Diagnosis Date    Arrhythmia     ASTHMA     Atrial fibrillation (HCC)     Back pain     Blood clotting disorder (HCC) I take Eliquis blood thinner    Breath shortness For several years    Chickenpox     Italian measles     Heart murmur If related to afib    Heart valve disease Afib diagnosed several years ago    Hemorrhagic disorder (HCC)     On Eliquis    Hiatus hernia syndrome several years ago, but not sure what Hiatal means    High cholesterol About 1 year ago    Hypertension     Peripheral vascular disease, unspecified (HCC)     Stroke (HCC) 1980    Tonsillitis     Unspecified disorder of thyroid        SURGICAL HISTORY  Past Surgical History:   Procedure Laterality Date    EYE SURGERY Left 02/27/2023    INGUINAL HERNIA REPAIR  09/05/2012    Performed by RIGOBERTO MCINTYRE at SURGERY [facility] ORS    INGUINAL HERNIA REPAIR  11/03/2011    Performed by RIGOBERTO MCINTYRE at SURGERY [facility] ORS    OTHER  2009    L3-4-5 susion    OTHER  1980    C1-2-3 fusion     "TONSILLECTOMY  1952    CARPAL TUNNEL RELEASE      LAMINOTOMY      L 1,4,5 FUSION C1,2,3 FUSION       FAMILY HISTORY  Family History   Problem Relation Age of Onset    Cancer Other     Diabetes Mother     No Known Problems Father     Lung Disease Sister     Cancer Brother        SOCIAL HISTORY   reports that he quit smoking about 43 years ago. His smoking use included cigarettes. He started smoking about 63 years ago. He has a 20.00 pack-year smoking history. He has never been exposed to tobacco smoke. He has never used smokeless tobacco. He reports current alcohol use of about 4.2 - 16.8 oz per week. He reports that he does not use drugs.    CURRENT MEDICATIONS  Previous Medications    ALBUTEROL 108 (90 BASE) MCG/ACT AERO SOLN INHALATION AEROSOL    Inhale 1-2 Puffs every four hours as needed for Shortness of Breath.    APIXABAN (ELIQUIS) 5MG TAB    Take 1 Tablet by mouth 2 times a day.    BUDESONIDE-FORMOTEROL (SYMBICORT) 160-4.5 MCG/ACT AEROSOL    Inhale 2 Puffs 2 times a day. Use with spacer.  Rinse mouth after each use.    FERROUS SULFATE (IRON) 325 (65 FE) MG TAB    Take  by mouth.    GLUCOSAMINE-CHONDROIT-VIT C-MN (GLUCOSAMINE 1500 COMPLEX) CAP    Take  by mouth.    LEVOTHYROXINE (SYNTHROID) 88 MCG TAB    Take 88 mcg by mouth Every morning on an empty stomach.    LOSARTAN-HYDROCHLOROTHIAZIDE (HYZAAR) 50-12.5 MG PER TABLET    Take 1 Tablet by mouth every day.    OMEGA-3 FATTY ACIDS (FISH OIL PO)    Take  by mouth.    ROSUVASTATIN (CRESTOR) 10 MG TAB    Take 1 Tablet by mouth every evening.    TAMSULOSIN (FLOMAX) 0.4 MG CAPSULE           ALLERGIES  Tape    PHYSICAL EXAM  /64   Pulse 61   Temp 36.3 °C (97.4 °F) (Temporal)   Resp 17   Ht 1.88 m (6' 2\")   Wt 86.5 kg (190 lb 11.2 oz)   SpO2 99%   BMI 24.48 kg/m²   Nursing note and vitals reviewed.  Constitutional: Well-developed and well-nourished.  Sitting up on the gurney.  Speaking with a cardiologist.  HENT: Head is normocephalic and atraumatic. "   Eyes: Pupils are equal, round. Conjunctiva are normal.   Cardiovascular: Normal rate and regular rhythm.   Pulmonary/Chest: Breath sounds normal. No wheezes or rales. No chest wall tenderness.   Abdominal: Soft and non-tender. No distention   Musculoskeletal: Extremities exhibit normal range of motion without edema or tenderness. No calf tenderness or palpable cords.   Neurological: Awake, alert moving all extremities. No focal deficits noted.  Skin: Skin is warm and dry. No rash.   Psychiatric: Normal mood and affect. Appropriate for clinical situation    DIAGNOSTIC STUDIES    Labs:   Results for orders placed or performed during the hospital encounter of 03/09/23   CBC with Differential   Result Value Ref Range    WBC 6.7 4.8 - 10.8 K/uL    RBC 4.65 (L) 4.70 - 6.10 M/uL    Hemoglobin 14.8 14.0 - 18.0 g/dL    Hematocrit 43.7 42.0 - 52.0 %    MCV 94.0 81.4 - 97.8 fL    MCH 31.8 27.0 - 33.0 pg    MCHC 33.9 33.7 - 35.3 g/dL    RDW 46.0 35.9 - 50.0 fL    Platelet Count 285 164 - 446 K/uL    MPV 9.5 9.0 - 12.9 fL    Neutrophils-Polys 68.40 44.00 - 72.00 %    Lymphocytes 21.00 (L) 22.00 - 41.00 %    Monocytes 6.60 0.00 - 13.40 %    Eosinophils 2.70 0.00 - 6.90 %    Basophils 0.90 0.00 - 1.80 %    Immature Granulocytes 0.40 0.00 - 0.90 %    Nucleated RBC 0.00 /100 WBC    Neutrophils (Absolute) 4.58 1.82 - 7.42 K/uL    Lymphs (Absolute) 1.41 1.00 - 4.80 K/uL    Monos (Absolute) 0.44 0.00 - 0.85 K/uL    Eos (Absolute) 0.18 0.00 - 0.51 K/uL    Baso (Absolute) 0.06 0.00 - 0.12 K/uL    Immature Granulocytes (abs) 0.03 0.00 - 0.11 K/uL    NRBC (Absolute) 0.00 K/uL   Comp Metabolic Panel   Result Value Ref Range    Sodium 139 135 - 145 mmol/L    Potassium 3.9 3.6 - 5.5 mmol/L    Chloride 104 96 - 112 mmol/L    Co2 22 20 - 33 mmol/L    Anion Gap 13.0 7.0 - 16.0    Glucose 107 (H) 65 - 99 mg/dL    Bun 20 8 - 22 mg/dL    Creatinine 1.36 0.50 - 1.40 mg/dL    Calcium 8.9 8.5 - 10.5 mg/dL    AST(SGOT) 21 12 - 45 U/L    ALT(SGPT) 21  2 - 50 U/L    Alkaline Phosphatase 63 30 - 99 U/L    Total Bilirubin 1.4 0.1 - 1.5 mg/dL    Albumin 3.9 3.2 - 4.9 g/dL    Total Protein 6.4 6.0 - 8.2 g/dL    Globulin 2.5 1.9 - 3.5 g/dL    A-G Ratio 1.6 g/dL   CORRECTED CALCIUM   Result Value Ref Range    Correct Calcium 9.0 8.5 - 10.5 mg/dL   ESTIMATED GFR   Result Value Ref Range    GFR (CKD-EPI) 54 (A) >60 mL/min/1.73 m 2   EKG (NOW)   Result Value Ref Range    Report       [facility]    Test Date:  2023  Pt Name:    EMILY RAMEY              Department: ER  MRN:        [id number]                      Room:  Gender:     Male                         Technician: [id number]  :        1945                   Requested By:ER TRIAGE PROTOCOL  Order #:    [id number]                    Reading MD: PATITO HOGAN MD    Measurements  Intervals                                Axis  Rate:       53                           P:  OH:                                      QRS:        -66  QRSD:       126                          T:          67  QT:         693  QTc:        651    Interpretive Statements  Sinus rhythm with first degree AV block  Nonspecific IVCD with LAD  Anteroseptal infarct, age indeterminate  Compared to ECG 2023 16:02:23  Intraventricular conduction delay now present  Myocardial infarct finding still present  Electronically Signed On 3-9-2023 9:22:27 PST by PATITO HOGAN MD     EKG   Result Value Ref Range    Report       EKG:   I have independently interpreted this EKG as above     Radiology:   The attending emergency physician has independently interpreted the diagnostic imaging associated with this visit and am waiting the final reading from the radiologist.   Preliminary interpretation is a follows: No acute abnormality  Radiologist interpretation:                  DX-CHEST-PORTABLE (1 VIEW)   Final Result      No acute cardiac or pulmonary abnormalities are identified.      CL-PERMANENT PACEMAKER  INSERTION    (Results Pending)       COURSE & MEDICAL DECISION MAKING     ED Observation Status? No; Patient does not meet criteria for ED Observation.     INITIAL ASSESSMENT, COURSE AND PLAN  9:23 AM - Patient seen and examined at bedside. Dr. Cerna (cardiology) is at bedside and admission orders have been placed for pacemaker placement. Patient verbalizes understanding and agreement to this plan of care. Ordered for chest x-ray, CBC w/ diff, CMP, and EKG to evaluate his symptoms. Patinet care will be transferred to Dr. Cerna.    DISPOSITION AND DISCUSSIONS  I have discussed management of the patient with the following physicians and CECY's:  Dr. Cerna (cardiology)    Barriers to care at this time, including but not limited to:  none .     DISPOSITION:  Patient will be hospitalized by Dr. Cerna in guarded condition.    FINAL DIANGOSIS  1. Heart block          Madison BULLARD (Scribe), am scribing for, and in the presence of, Zoya Cerna M.D..    Electronically signed by: Madison Sy (Barbieibe), 3/9/2023    Zoya BULLARD M.D. personally performed the services described in this documentation, as scribed by Madison Sy in my presence, and it is both accurate and complete.   The note accurately reflects work and decisions made by me.  Amilcar Laguna M.D.  3/9/2023  1:41 PM

## 2023-03-09 NOTE — H&P
Cardiology Admission History and Physical    DOS: 3/9/2023    Referring physician: Epifanio Munoz MD    Chief complaint/Reason for consult: Symptomatic AV block    HPI:  Pt is a 78 yo M. He has a history of PAF and prior CVA, chronic anticoagulation, with chronic exertional dyspnea. Extensive work pulmonary work up has been largely unrevealing. Prior PFTs have not demonstrated outflow obstruction responsive to bronchodilators despite his asthma diagnosis. Prior perfusion studies have also been negative for ischemia. Prior treadmill testing showing chronotropic incompetence. More recently had retinal detachment and surgical repair was canceled due to marked first degree AV block. There was a question of junctional rhythm as well and then he was sent to the emergency room for consideration of PPM. Denies CP. Denies syncope.     ROS (+ highlighted in red):  Constitutional: Fevers/chills/fatigue/weightloss  HEENT: Blurry vision/eye pain/sore throat/hearing loss  Respiratory: Shortness of breath/cough  Cardiovascular: Chest pain/palpitations/edema/orthopnea/syncope  GI: Nausea/vomitting/diarrhea  MSK: Arthralgias/myagias/muscle weakness  Skin: Rash/sores  Neurological: Numbness/tremors/vertigo  Endocrine: Excessive thirst/polyuria/cold intolerance/heat intolerance  Psych: Depression/anxiety    Past Medical History:   Diagnosis Date    Arrhythmia     ASTHMA     Atrial fibrillation (HCC)     Back pain     Blood clotting disorder (HCC) I take Eliquis blood thinner    Breath shortness For several years    Chickenpox     Chinese measles     Heart murmur If related to afib    Heart valve disease Afib diagnosed several years ago    Hemorrhagic disorder (HCC)     On Eliquis    Hiatus hernia syndrome several years ago, but not sure what Hiatal means    High cholesterol About 1 year ago    Hypertension     Peripheral vascular disease, unspecified (HCC)     Stroke (HCC) 1980    Tonsillitis     Unspecified disorder of thyroid         Past Surgical History:   Procedure Laterality Date    EYE SURGERY Left 2023    INGUINAL HERNIA REPAIR  2012    Performed by RIGOBERTO MCINTYRE at SURGERY [facility] ORS    INGUINAL HERNIA REPAIR  2011    Performed by RIGOBERTO MCINTYRE at SURGERY [facility] ORS    OTHER      L3-4-5 susion    OTHER      C1-2-3 fusion    TONSILLECTOMY      CARPAL TUNNEL RELEASE      LAMINOTOMY      L 1,4,5 FUSION C1,2,3 FUSION       Social History     Socioeconomic History    Marital status:      Spouse name: Not on file    Number of children: Not on file    Years of education: Not on file    Highest education level: Not on file   Occupational History    Not on file   Tobacco Use    Smoking status: Former     Packs/day: 1.00     Years: 20.00     Pack years: 20.00     Types: Cigarettes     Start date: 1960     Quit date: 1980     Years since quittin.2     Passive exposure: Never    Smokeless tobacco: Never   Vaping Use    Vaping Use: Never used   Substance and Sexual Activity    Alcohol use: Yes     Alcohol/week: 4.2 - 16.8 oz     Types: 7 Standard drinks or equivalent per week     Comment: 7 DRINKS PER WEEK    Drug use: Never     Types: Marijuana, Oral     Comment: has not used 60 years ago     Sexual activity: Not on file   Other Topics Concern    Not on file   Social History Narrative    Not on file     Social Determinants of Health     Financial Resource Strain: Not on file   Food Insecurity: Not on file   Transportation Needs: Not on file   Physical Activity: Not on file   Stress: Not on file   Social Connections: Not on file   Intimate Partner Violence: Not on file   Housing Stability: Not on file       Family History   Problem Relation Age of Onset    Cancer Other     Diabetes Mother     No Known Problems Father     Lung Disease Sister     Cancer Brother        Allergies   Allergen Reactions    Tape      Blisters and scarring.  Paper tape       No current facility-administered  "medications for this encounter.     Current Outpatient Medications   Medication Sig Dispense Refill    budesonide-formoterol (SYMBICORT) 160-4.5 MCG/ACT Aerosol Inhale 2 Puffs 2 times a day. Use with spacer.  Rinse mouth after each use. 3 Each 3    albuterol 108 (90 Base) MCG/ACT Aero Soln inhalation aerosol Inhale 1-2 Puffs every four hours as needed for Shortness of Breath. 1 Each 6    rosuvastatin (CRESTOR) 10 MG Tab Take 1 Tablet by mouth every evening. 90 Tablet 1    apixaban (ELIQUIS) 5mg Tab Take 1 Tablet by mouth 2 times a day. 180 Tablet 1    tamsulosin (FLOMAX) 0.4 MG capsule       losartan-hydrochlorothiazide (HYZAAR) 50-12.5 MG per tablet Take 1 Tablet by mouth every day. 90 Tablet 7    Omega-3 Fatty Acids (FISH OIL PO) Take  by mouth.      Ferrous Sulfate (IRON) 325 (65 Fe) MG Tab Take  by mouth.      levothyroxine (SYNTHROID) 88 MCG Tab Take 88 mcg by mouth Every morning on an empty stomach.      Glucosamine-Chondroit-Vit C-Mn (GLUCOSAMINE 1500 COMPLEX) Cap Take  by mouth.         Physical Exam:  Vitals:    03/09/23 0848 03/09/23 0901   BP: 132/64    Pulse: 61    Resp: 17    Temp: 36.3 °C (97.4 °F)    TempSrc: Temporal    SpO2: 99%    Weight:  86.5 kg (190 lb 11.2 oz)   Height:  1.88 m (6' 2\")     General appearance: NAD, conversant  Eyes: anicteric sclerae, no lid-lag; PERRLA  HENT: Atraumatic; moist mucous membranes, no ulcerations  Neck: Trachea midline; FROM, supple, no thyromegaly  Lungs: CTA, with normal respiratory effort and no intercostal retractions  CV: RRR, no MRGs, no JVD  Abdomen: Soft, non-tender; normal bowel sounds, no HSM  Extremities: No peripheral edema. No clubbing or cyanosis.  Skin: Normal temperature, turgor and texture; no rash or ulcers  Psych: Appropriate affect, alert and oriented to person, place and time      Data:  Labs reviewed    Prior echo/stress reviewed:  Normal systolic function on echo  NM perfusion study without ischemia    PFTs reviewed    EKG interpreted by " me:  Sinus, marked first degree AV block    ETT showing AF without adequate rate response    Impression/Plan:  1) PAF  2) Symptomatic first degree AV block    -I discussed that with such marked prolonged AV conduction time despite no evidence of higher grade block could be cause of his symptoms if fatigue and exertional dyspnea as other w/u has not been revealing and PPM not unreasonable  -Risks/benefits/alternatives discussed, all questions answered, and he is agreeable to proceed  -Proceed with PPM    Zoya Cerna MD

## 2023-03-09 NOTE — PROGRESS NOTES
Cardiology Follow-up Consultation Note    Date of note:    3/8/2023    Primary Care Provider: Gerald RAGSDALE M.D.    Name:             Giovany Kruger   YOB: 1945  MRN:               [id number]    Chief Complaint   Patient presents with    Atrial Fibrillation    Hypertension       HISTORY OF PRESENT ILLNESS  Mr. Giovany Kruger is a 77 y.o. male who returns to see us for follow-up of paroxysmal A-fib.  History of prior CVA (not from afib), chronic anticoagulation, antiarrhythmic medication, aortic ectasia and hypertension, HLP, mild asthma, coronary artery calcification.    Last clinic visit: 8/3/2022    Interim History:  Since his last visit, patient continues to experience significant dyspnea on exertion.  Was recently diagnosed with left retinal detachment.  Was scheduled for outpatient surgery today.  Preop EKG showed first-degree AV block and anesthesiologist refused to operate on the patient.  He was discharged with recommendation to follow-up with cardiologist.    In regards to ongoing dyspnea, underwent extensive work-up by Dr. Mix with PFTs and CPET with no significant pulmonary abnormality.  Was noted to have blunted heart rate response during CPET.    Past Medical History:   Diagnosis Date    Arrhythmia     ASTHMA     Atrial fibrillation (HCC)     Back pain     Blood clotting disorder (HCC) I take Eliquis blood thinner    Breath shortness For several years    Chickenpox     Syriac measles     Heart murmur If related to afib    Heart valve disease Afib diagnosed several years ago    Hemorrhagic disorder (HCC)     On Eliquis    Hiatus hernia syndrome several years ago, but not sure what Hiatal means    High cholesterol About 1 year ago    Hypertension     Peripheral vascular disease, unspecified (HCC)     Stroke (HCC) 1980    Tonsillitis     Unspecified disorder of thyroid          Past Surgical History:   Procedure Laterality Date    EYE SURGERY Left 02/27/2023     INGUINAL HERNIA REPAIR  09/05/2012    Performed by RIGOBERTO MCINTYRE at SURGERY [facility] ORS    INGUINAL HERNIA REPAIR  11/03/2011    Performed by RIGOBERTO MCINTYRE at SURGERY [facility] ORS    OTHER  2009    L3-4-5 susion    OTHER  1980    C1-2-3 fusion    TONSILLECTOMY  1952    CARPAL TUNNEL RELEASE      LAMINOTOMY      L 1,4,5 FUSION C1,2,3 FUSION         Current Outpatient Medications   Medication Sig Dispense Refill    budesonide-formoterol (SYMBICORT) 160-4.5 MCG/ACT Aerosol Inhale 2 Puffs 2 times a day. Use with spacer.  Rinse mouth after each use. 3 Each 3    albuterol 108 (90 Base) MCG/ACT Aero Soln inhalation aerosol Inhale 1-2 Puffs every four hours as needed for Shortness of Breath. 1 Each 6    rosuvastatin (CRESTOR) 10 MG Tab Take 1 Tablet by mouth every evening. 90 Tablet 1    apixaban (ELIQUIS) 5mg Tab Take 1 Tablet by mouth 2 times a day. 180 Tablet 1    tamsulosin (FLOMAX) 0.4 MG capsule       losartan-hydrochlorothiazide (HYZAAR) 50-12.5 MG per tablet Take 1 Tablet by mouth every day. 90 Tablet 7    Omega-3 Fatty Acids (FISH OIL PO) Take  by mouth.      Ferrous Sulfate (IRON) 325 (65 Fe) MG Tab Take  by mouth.      levothyroxine (SYNTHROID) 88 MCG Tab Take 88 mcg by mouth Every morning on an empty stomach.      Glucosamine-Chondroit-Vit C-Mn (GLUCOSAMINE 1500 COMPLEX) Cap Take  by mouth.       No current facility-administered medications for this visit.         Allergies   Allergen Reactions    Tape      Blisters and scarring.  Paper tape         Family History   Problem Relation Age of Onset    Cancer Other     Diabetes Mother     No Known Problems Father     Lung Disease Sister     Cancer Brother          Social History     Socioeconomic History    Marital status:      Spouse name: Not on file    Number of children: Not on file    Years of education: Not on file    Highest education level: Not on file   Occupational History    Not on file   Tobacco Use    Smoking status: Former      "Packs/day: 1.00     Years: 20.00     Pack years: 20.00     Types: Cigarettes     Start date: 1960     Quit date: 1980     Years since quittin.2     Passive exposure: Never    Smokeless tobacco: Never   Vaping Use    Vaping Use: Never used   Substance and Sexual Activity    Alcohol use: Yes     Alcohol/week: 4.2 - 16.8 oz     Types: 7 Standard drinks or equivalent per week     Comment: 7 DRINKS PER WEEK    Drug use: Never     Types: Marijuana, Oral     Comment: has not used 60 years ago     Sexual activity: Not on file   Other Topics Concern    Not on file   Social History Narrative    Not on file     Social Determinants of Health     Financial Resource Strain: Not on file   Food Insecurity: Not on file   Transportation Needs: Not on file   Physical Activity: Not on file   Stress: Not on file   Social Connections: Not on file   Intimate Partner Violence: Not on file   Housing Stability: Not on file         Physical Exam:  Ambulatory Vitals  /62 (BP Location: Left arm, Patient Position: Sitting, BP Cuff Size: Adult)   Pulse 94   Resp 16   Ht 1.88 m (6' 2\")   Wt 87.1 kg (192 lb)   SpO2 96%    Oxygen Therapy:  Pulse Oximetry: 96 %  BP Readings from Last 4 Encounters:   23 116/62   23 126/71   23 102/68   22 (!) 95/58       Weight/BMI: Body mass index is 24.65 kg/m².  Wt Readings from Last 4 Encounters:   23 87.1 kg (192 lb)   23 87.3 kg (192 lb 7.4 oz)   23 86.2 kg (190 lb)   22 84.8 kg (187 lb)       GEN: Well developed, well nourished and in no acute distress.  HEART: no significant JVD, regular rate and rhythm, normal S1 and S2, no murmurs, no third heart sounds, normal cardiac palpation  LUNG: clear to auscultation bilaterally, no wheezing, no crackles, normal respiratory effort on room air  ABDOMEN: soft, non-tender, non-distended, normal bowel sounds throughout  EXTREMITIES: no peripheral edema noted  VASCULAR: no significantly elevated " jugular venous pressure, no carotid bruits noted, radial pulses 2+ and equal      Lab Data Review:  Lab Results   Component Value Date/Time    CHOLSTRLTOT 227 (H) 04/18/2022 07:07 AM     (H) 04/18/2022 07:07 AM    HDL 86.0 (H) 04/18/2022 07:07 AM    TRIGLYCERIDE 71 04/18/2022 07:07 AM       Lab Results   Component Value Date/Time    SODIUM 138 03/08/2023 01:50 PM    POTASSIUM 4.6 03/08/2023 01:50 PM    CHLORIDE 104 03/08/2023 01:50 PM    CO2 23 03/08/2023 01:50 PM    GLUCOSE 99 03/08/2023 01:50 PM    BUN 23 (H) 03/08/2023 01:50 PM    CREATININE 1.24 03/08/2023 01:50 PM    BUNCREATRAT 16 12/23/2016 08:24 AM     Lab Results   Component Value Date/Time    ALKPHOSPHAT 51 04/18/2022 07:07 AM    ASTSGOT 15 04/18/2022 07:07 AM    ALTSGPT 17 04/18/2022 07:07 AM    TBILIRUBIN 1.2 (H) 04/18/2022 07:07 AM      Lab Results   Component Value Date/Time    WBC 6.8 03/08/2023 01:50 PM     No results found for: HBA1C    Cardiac Imaging and Procedures Review:    EKG dated 3/8/2023: My personal interpretation is accelerated junctional rhythm    EKG dated 3/8/2023: My personal rotation is sinus rhythm with first-degree AV block,  ms    Echo dated 8/26/2022:   My personal interpretation normal LV size and function, mild MR    Nuclear Perfusion Imaging (9/25/2019):   No evidence of ischemia or infarction  Negative stress ECG for ischemia      Radiology test Review:  CT chest 3/21/2022:   Noncalcified nodular densities are noted involving the bases of the right lungs as discussed above, 2 mm on the left, and 7 mm on the right.     Calcifications of coronary arteries are present.     CPET (2/16/2023):   INTERPRETATION:  This is not a maximal study as the VO2 max was not reached.    Cardiovascular abnormalities include a blunted heart rate and a blood pressure   response; however, the O2 pulse is normal, suggesting a normal stroke volume.    His diastolic blood pressure decreases significantly with exercise.  No   significant  ventilatory abnormalities are seen.  Gas exchange abnormalities   are difficult to conclude as SpO2 varied erratically during the test and are   likely not reflecting the patient's actual oxygen saturations.      Assessment & Plan     1. Paroxysmal atrial fibrillation (HCC)  EKG      2. Essential hypertension        3. Hypercoagulable state due to atrial fibrillation (HCC)        4. Chronic anticoagulation        5. Dyslipidemia        6. First degree AV block        7. Dyspnea on exertion        8. High risk medication use        9. Chronotropic incompetence            We discussed his presentation today, EKG changes with markedly first-degree AV block and ongoing symptoms which have been progressive.  His outpatient surgery for retinal detachment was canceled by anesthesiology.  Recommend going to the ER for permanent pacemaker implantation.  I discussed his case with Dr. Cerna who agrees with pacemaker.  Patient lives in [place] and will go to ER tomorrow with plan for pacemaker implantation.  We discussed holding Eliquis tonight and tomorrow morning.  Will be n.p.o. after midnight.    In regards to pacemaker implantation, specific risks mentioned to the patient including bleeding, cardiac perforation with possible tamponade possibly requiring pericardiocentesis or open heart surgery.  In addition, the possibility of lead dislodgment (2-3%), pneumothorax (3%), hemothorax, infection, risk of death, stroke, and myocardial infarction were also discussed.  The patient verbalized understanding of the potential complications and wishes to proceed with the procedure.    Is on high risk medication with amiodarone.  EKG shows stable QTc.  However, there is a concern for retinal detachment related to amiodarone toxicity.  Recommend discontinuing amiodarone at this time.    A total of 65 minutes of time was spent on day of encounter reviewing medical record, performing history and examination, counseling, ordering  medication/test/consults and documentation.    I have independently interpreted test results and discussed results and management with the patient and his wife.    All of patient and his wife's excellent questions were answered to the best of my knowledge and to their satisfaction.  It was a pleasure seeing Mr. Giovany Kruger in my clinic today. No follow-ups on file. Patient is aware to call the cardiology clinic with any questions or concerns.      Epifanio Munoz MD  [facility]  [facility], [facility]  [address] [address]  Phone: [phone]  Fax: [phone]    Please note that this dictation was created using voice recognition software. I have made every reasonable attempt to correct obvious errors, but it is possible there are errors of grammar and possibly content that I did not discover before finalizing the note.

## 2023-03-10 NOTE — DISCHARGE INSTRUCTIONS
Pacemaker Implantation, Adult  Pacemaker implantation is a procedure to place a pacemaker inside your chest. A pacemaker is a small computer that sends electrical signals to the heart and helps your heart beat normally. A pacemaker also stores information about your heart rhythms. You may need pacemaker implantation if you:  Have a slow heartbeat (bradycardia).  Faint (syncope).  Have shortness of breath (dyspnea) due to heart problems.  The pacemaker attaches to your heart through a wire, called a lead. Sometimes just one lead is needed. Other times, there will be two leads. There are two types of pacemakers:  Transvenous pacemaker. This type is placed under the skin or muscle of your chest. The lead goes through a vein in the chest area to reach the inside of the heart.  Epicardial pacemaker. This type is placed under the skin or muscle of your chest or belly. The lead goes through your chest to the outside of the heart.  Tell a health care provider about:  Any allergies you have.  All medicines you are taking, including vitamins, herbs, eye drops, creams, and over-the-counter medicines.  Any problems you or family members have had with anesthetic medicines.  Any blood or bone disorders you have.  Any surgeries you have had.  Any medical conditions you have.  Whether you are pregnant or may be pregnant.  What are the risks?  Generally, this is a safe procedure. However, problems may occur, including:  Infection.  Bleeding.  Failure of the pacemaker or the lead.  Collapse of a lung or bleeding into a lung.  Blood clot inside a blood vessel with a lead.  Damage to the heart.  Infection inside the heart (endocarditis).  Allergic reactions to medicines.  What happens before the procedure?  Staying hydrated  Follow instructions from your health care provider about hydration, which may include:  Up to 2 hours before the procedure - you may continue to drink clear liquids, such as water, clear fruit juice, black coffee,  and plain tea.  Eating and drinking restrictions  Follow instructions from your health care provider about eating and drinking, which may include:  8 hours before the procedure - stop eating heavy meals or foods such as meat, fried foods, or fatty foods.  6 hours before the procedure - stop eating light meals or foods, such as toast or cereal.  6 hours before the procedure - stop drinking milk or drinks that contain milk.  2 hours before the procedure - stop drinking clear liquids.  Medicines  Ask your health care provider about:  Changing or stopping your regular medicines. This is especially important if you are taking diabetes medicines or blood thinners.  Taking medicines such as aspirin and ibuprofen. These medicines can thin your blood. Do not take these medicines before your procedure if your health care provider instructs you not to.  You may be given antibiotic medicine to help prevent infection.  General instructions  You will have a heart evaluation. This may include an electrocardiogram (ECG), chest X-ray, and heart imaging (echocardiogram,  or echo) tests.  You will have blood tests.  Do not use any products that contain nicotine or tobacco, such as cigarettes and e-cigarettes. If you need help quitting, ask your health care provider.  Plan to have someone take you home from the hospital or clinic.  If you will be going home right after the procedure, plan to have someone with you for 24 hours.  Ask your health care provider how your surgical site will be marked or identified.  What happens during the procedure?  To reduce your risk of infection:  Your health care team will wash or sanitize their hands.  Your skin will be washed with soap.  Hair may be removed from the surgical area.  An IV tube will be inserted into one of your veins.  You will be given one or more of the following:  A medicine to help you relax (sedative).  A medicine to numb the area (local anesthetic).  A medicine to make you fall  asleep (general anesthetic).  If you are getting a transvenous pacemaker:  An incision will be made in your upper chest.  A pocket will be made for the pacemaker. It may be placed under the skin or between layers of muscle.  The lead will be inserted into a blood vessel that returns to the heart.  While X-rays are taken by an imaging machine (fluoroscopy), the lead will be advanced through the vein to the inside of your heart.  The other end of the lead will be tunneled under the skin and attached to the pacemaker.  If you are getting an epicardial pacemaker:  An incision will be made near your ribs or breastbone (sternum) for the lead.  The lead will be attached to the outside of your heart.  Another incision will be made in your chest or upper belly to create a pocket for the pacemaker.  The free end of the lead will be tunneled under the skin and attached to the pacemaker.  The transvenous or epicardial pacemaker will be tested. Imaging studies may be done to check the lead position.  The incisions will be closed with stitches (sutures), adhesive strips, or skin glue.  Bandages (dressing) will be placed over the incisions.  The procedure may vary among health care providers and hospitals.  What happens after the procedure?  Your blood pressure, heart rate, breathing rate, and blood oxygen level will be monitored until the medicines you were given have worn off.  You will be given antibiotics and pain medicine.  ECG and chest x-rays will be done.  You will wear a continuous type of ECG (Holter monitor) to check your heart rhythm.  Your health care provider will program the pacemaker.  Do not drive for 24 hours if you received a sedative.  This information is not intended to replace advice given to you by your health care provider. Make sure you discuss any questions you have with your health care provider.  Document Released: 12/08/2003 Document Revised: 09/06/2019 Document Reviewed: 05/31/2017  [employer] Patient  Education © 2020 [employer]

## 2023-03-13 ENCOUNTER — TELEPHONE (OUTPATIENT)
Dept: CARDIOLOGY | Facility: MEDICAL CENTER | Age: 78
End: 2023-03-13
Payer: MEDICARE

## 2023-03-13 DIAGNOSIS — G89.18 POST-OPERATIVE PAIN: ICD-10-CM

## 2023-03-13 NOTE — TELEPHONE ENCOUNTER
PEYTON    Caller: Jannet ( spouse)    Topic/issue: FYI- Pt calling to report some discomfort in the center of his chest, not around the incision area. Asking for a call back.  No other symptoms    Callback Number: [phone] (home)      Thank You    Michelle GIVENS

## 2023-03-14 RX ORDER — COLCHICINE 0.6 MG/1
0.6 TABLET ORAL 2 TIMES DAILY
Qty: 60 TABLET | Refills: 0 | Status: SHIPPED | OUTPATIENT
Start: 2023-03-14 | End: 2023-03-28

## 2023-03-14 NOTE — TELEPHONE ENCOUNTER
Phone Number Called: [phone]    Call outcome: Spoke to patient regarding message below.    Message: Called to return patient's phone call regarding chest pain and some shortness of breath.       Patient reports that he was uncomfortable last night, felt better and now is not feeling well again.     Patient reports that when he coughs or sneezes or takes a deep breath he has chest pain. Patient reports that he has had chest discomfort since pacemaker was placed last Thursday, 3/9/23. Patient reports chest pain is 2-4/10. Patient reports vital signs are currently stable.     Patient reports taking tylenol pm last night, but states this did not help his chest discomfort.     ER precautions advised. Patient verbalized understanding.     To: DA    Patient reports having chest discomfort and shortness of breath when taking a deep breath. Patient reports this has been happening since pacemaker placement on 3/9/23, but states shortness of breath is no worse prior to pacemaker placement. ER precautions advised to patient. Does patient need chest xray or just continue with taking Tylenol post op? Please advise. Thank you

## 2023-03-14 NOTE — TELEPHONE ENCOUNTER
Caller: - Jannet    Reported Symptoms:   Fabio is still having some chest discomfort, and would like a call back, also having SOB.  When inhaling he is having pain. Symptoms came on yesterday afternoon.     Recent Blood Pressure/Heart Rate Reading: N/A    Callback Number: [phone]    Thank you,   Beckie CHAUDHRY

## 2023-03-14 NOTE — TELEPHONE ENCOUNTER
Phone Number Called: [phone]    Call outcome: Spoke to patient regarding message below.    Message: Called to inform patient of DA recommendations.     Epifanio Munoz M.D.   Recommend starting colchicine 0.6 mg twice daily for 4 weeks.  Thanks.    Patient reports that he has taken tylenol and his chest pain has decreased.     RN advised that patient not take more than 4000 mg of Tylenol in  a 24 hour period. Patient verbalized understanding. No further questions at this time.

## 2023-03-17 ENCOUNTER — NON-PROVIDER VISIT (OUTPATIENT)
Dept: CARDIOLOGY | Facility: MEDICAL CENTER | Age: 78
End: 2023-03-17

## 2023-03-17 ENCOUNTER — TELEPHONE (OUTPATIENT)
Dept: CARDIOLOGY | Facility: MEDICAL CENTER | Age: 78
End: 2023-03-17

## 2023-03-17 ENCOUNTER — NON-PROVIDER VISIT (OUTPATIENT)
Dept: CARDIOLOGY | Facility: MEDICAL CENTER | Age: 78
End: 2023-03-17
Payer: MEDICARE

## 2023-03-17 DIAGNOSIS — Z95.0 CARDIAC PACEMAKER IN SITU: ICD-10-CM

## 2023-03-17 DIAGNOSIS — I44.30 AV BLOCK: ICD-10-CM

## 2023-03-17 DIAGNOSIS — I49.5 SICK SINUS SYNDROME (HCC): ICD-10-CM

## 2023-03-17 PROCEDURE — 93279 PRGRMG DEV EVAL PM/LDLS PM: CPT | Performed by: INTERNAL MEDICINE

## 2023-03-17 NOTE — TELEPHONE ENCOUNTER
FYI--patient seen in device clinic today. Device is functioning appropriately.  Patient in persistent AF since 3/14/23.  Patient on Eliquis.

## 2023-03-22 NOTE — CARDIAC REMOTE MONITOR - SCAN
In office interrogation reviewed. Device demonstrated appropriate function.       Electronically Signed by: Mathew Santa M.D.    3/26/2023  10:35 AM

## 2023-04-04 ENCOUNTER — TELEPHONE (OUTPATIENT)
Dept: CARDIOLOGY | Facility: MEDICAL CENTER | Age: 78
End: 2023-04-04
Payer: MEDICARE

## 2023-04-04 DIAGNOSIS — I48.0 PAROXYSMAL ATRIAL FIBRILLATION (HCC): ICD-10-CM

## 2023-04-04 NOTE — TELEPHONE ENCOUNTER
Patient is scheduled on 4-7-23 for a Cardioversion w/anesthesia with . No meds to stop and patient to check in at 8:00 for a 10:00 procedure. H&P was done on 3-28-23 by . Pre admit to call patient.

## 2023-04-04 NOTE — TELEPHONE ENCOUNTER
----- Message from Epifanio Munoz M.D. sent at 4/4/2023 11:45 AM PDT -----  Ordered cardioversion for this patient.  Can you please schedule him with me?  Okay to do without anesthesia, if needed.  Thanks.

## 2023-04-05 ENCOUNTER — APPOINTMENT (OUTPATIENT)
Dept: ADMISSIONS | Facility: MEDICAL CENTER | Age: 78
End: 2023-04-05
Attending: INTERNAL MEDICINE
Payer: MEDICARE

## 2023-04-05 ENCOUNTER — TELEPHONE (OUTPATIENT)
Dept: CARDIOLOGY | Facility: MEDICAL CENTER | Age: 78
End: 2023-04-05
Payer: MEDICARE

## 2023-04-05 NOTE — TELEPHONE ENCOUNTER
----- Message from Epifanio Munoz M.D. sent at 4/4/2023  4:28 PM PDT -----  Regarding: RE: Cardioversion scheduled for 4-7-23  Can you actually schedule it after his nuclear stress test?  I saw him in [place] and we talked about cardioversion.  I will call and speak with him.  Thanks.  ----- Message -----  From: Jovani Mancera  Sent: 4/4/2023   4:10 PM PDT  To: Epifanio Munoz M.D.  Subject: Cardioversion scheduled for 4-7-23               Called patient to schedule Cardioversion for this Friday and patient didn't even know he was having this done. He said he would send you a betNOW message. He did schedule it though for this Friday.    ----- Message -----  From: Epifanio Munoz M.D.  Sent: 4/4/2023  11:46 AM PDT  To: Jovani Mancera    Ordered cardioversion for this patient.  Can you please schedule him with me?  Okay to do without anesthesia, if needed.  Thanks.

## 2023-04-05 NOTE — TELEPHONE ENCOUNTER
Patient has been rescheduled to 4-21-23 for Cardioversion w/anesthesia with . Stress test is scheduled for 4-19-23. No meds to stop and patient to check in at 9:00 for an 11:00 procedure. H&P was done on  3-28-23 by Dr. Munoz. Pre admit to call patient. Tanja Ye to be at procedure.

## 2023-04-07 ENCOUNTER — PRE-ADMISSION TESTING (OUTPATIENT)
Dept: ADMISSIONS | Facility: MEDICAL CENTER | Age: 78
End: 2023-04-07
Attending: INTERNAL MEDICINE
Payer: MEDICARE

## 2023-04-19 ENCOUNTER — PATIENT MESSAGE (OUTPATIENT)
Dept: CARDIOLOGY | Facility: MEDICAL CENTER | Age: 78
End: 2023-04-19
Payer: MEDICARE

## 2023-04-20 NOTE — TELEPHONE ENCOUNTER
Esha: Can you please confirm if patient is still in atrial fibrillation or not?  If he is back in sinus, we will cancel tomorrow's cardioversion. Thanks.    Cheyanne CONTRERAS.

## 2023-04-20 NOTE — TELEPHONE ENCOUNTER
Last remote transmission 4/2/2023, shows AF. Attempted to call pt at both numbers and could not get through. Pt needs to send a new transmission in order to confirm rhythm.

## 2023-04-21 ENCOUNTER — APPOINTMENT (OUTPATIENT)
Dept: CARDIOLOGY | Facility: MEDICAL CENTER | Age: 78
End: 2023-04-21
Attending: INTERNAL MEDICINE
Payer: MEDICARE

## 2023-04-21 ENCOUNTER — HOSPITAL ENCOUNTER (OUTPATIENT)
Facility: MEDICAL CENTER | Age: 78
End: 2023-04-21
Attending: INTERNAL MEDICINE | Admitting: INTERNAL MEDICINE
Payer: MEDICARE

## 2023-04-21 ENCOUNTER — ANESTHESIA EVENT (OUTPATIENT)
Dept: CARDIOLOGY | Facility: MEDICAL CENTER | Age: 78
End: 2023-04-21
Payer: MEDICARE

## 2023-04-21 ENCOUNTER — ANESTHESIA (OUTPATIENT)
Dept: CARDIOLOGY | Facility: MEDICAL CENTER | Age: 78
End: 2023-04-21
Payer: MEDICARE

## 2023-04-21 VITALS
SYSTOLIC BLOOD PRESSURE: 129 MMHG | HEIGHT: 74 IN | WEIGHT: 196.65 LBS | TEMPERATURE: 96.8 F | HEART RATE: 50 BPM | RESPIRATION RATE: 18 BRPM | BODY MASS INDEX: 25.24 KG/M2 | DIASTOLIC BLOOD PRESSURE: 68 MMHG | OXYGEN SATURATION: 96 %

## 2023-04-21 DIAGNOSIS — I48.0 PAROXYSMAL ATRIAL FIBRILLATION (HCC): ICD-10-CM

## 2023-04-21 LAB
ALBUMIN SERPL BCP-MCNC: 4 G/DL (ref 3.2–4.9)
ALBUMIN/GLOB SERPL: 2.1 G/DL
ALP SERPL-CCNC: 62 U/L (ref 30–99)
ALT SERPL-CCNC: 16 U/L (ref 2–50)
ANION GAP SERPL CALC-SCNC: 10 MMOL/L (ref 7–16)
AST SERPL-CCNC: 18 U/L (ref 12–45)
BILIRUB SERPL-MCNC: 1 MG/DL (ref 0.1–1.5)
BUN SERPL-MCNC: 24 MG/DL (ref 8–22)
CALCIUM ALBUM COR SERPL-MCNC: 8.6 MG/DL (ref 8.5–10.5)
CALCIUM SERPL-MCNC: 8.6 MG/DL (ref 8.5–10.5)
CHLORIDE SERPL-SCNC: 110 MMOL/L (ref 96–112)
CO2 SERPL-SCNC: 22 MMOL/L (ref 20–33)
CREAT SERPL-MCNC: 1.32 MG/DL (ref 0.5–1.4)
EKG IMPRESSION: NORMAL
EKG IMPRESSION: NORMAL
ERYTHROCYTE [DISTWIDTH] IN BLOOD BY AUTOMATED COUNT: 47.5 FL (ref 35.9–50)
GFR SERPLBLD CREATININE-BSD FMLA CKD-EPI: 56 ML/MIN/1.73 M 2
GLOBULIN SER CALC-MCNC: 1.9 G/DL (ref 1.9–3.5)
GLUCOSE SERPL-MCNC: 102 MG/DL (ref 65–99)
HCT VFR BLD AUTO: 39.9 % (ref 42–52)
HGB BLD-MCNC: 13.1 G/DL (ref 14–18)
INR PPP: 1.47 (ref 0.87–1.13)
MCH RBC QN AUTO: 31.2 PG (ref 27–33)
MCHC RBC AUTO-ENTMCNC: 32.8 G/DL (ref 33.7–35.3)
MCV RBC AUTO: 95 FL (ref 81.4–97.8)
PLATELET # BLD AUTO: 247 K/UL (ref 164–446)
PMV BLD AUTO: 9.9 FL (ref 9–12.9)
POTASSIUM SERPL-SCNC: 4.1 MMOL/L (ref 3.6–5.5)
PROT SERPL-MCNC: 5.9 G/DL (ref 6–8.2)
PROTHROMBIN TIME: 17.6 SEC (ref 12–14.6)
RBC # BLD AUTO: 4.2 M/UL (ref 4.7–6.1)
SODIUM SERPL-SCNC: 142 MMOL/L (ref 135–145)
WBC # BLD AUTO: 6.4 K/UL (ref 4.8–10.8)

## 2023-04-21 PROCEDURE — 36415 COLL VENOUS BLD VENIPUNCTURE: CPT

## 2023-04-21 PROCEDURE — 00410 ANES INTEG SYS CONV ARRHYT: CPT | Performed by: ANESTHESIOLOGY

## 2023-04-21 PROCEDURE — 93010 ELECTROCARDIOGRAM REPORT: CPT | Mod: 76,59 | Performed by: INTERNAL MEDICINE

## 2023-04-21 PROCEDURE — 700111 HCHG RX REV CODE 636 W/ 250 OVERRIDE (IP): Performed by: ANESTHESIOLOGY

## 2023-04-21 PROCEDURE — 85610 PROTHROMBIN TIME: CPT

## 2023-04-21 PROCEDURE — 160046 HCHG PACU - 1ST 60 MINS PHASE II

## 2023-04-21 PROCEDURE — 93005 ELECTROCARDIOGRAM TRACING: CPT | Performed by: INTERNAL MEDICINE

## 2023-04-21 PROCEDURE — 85027 COMPLETE CBC AUTOMATED: CPT

## 2023-04-21 PROCEDURE — 160035 HCHG PACU - 1ST 60 MINS PHASE I

## 2023-04-21 PROCEDURE — 700105 HCHG RX REV CODE 258: Performed by: INTERNAL MEDICINE

## 2023-04-21 PROCEDURE — 80053 COMPREHEN METABOLIC PANEL: CPT

## 2023-04-21 PROCEDURE — 160002 HCHG RECOVERY MINUTES (STAT)

## 2023-04-21 PROCEDURE — 92960 CARDIOVERSION ELECTRIC EXT: CPT

## 2023-04-21 PROCEDURE — 92960 CARDIOVERSION ELECTRIC EXT: CPT | Performed by: INTERNAL MEDICINE

## 2023-04-21 PROCEDURE — 99100 ANES PT EXTEME AGE<1 YR&>70: CPT | Performed by: ANESTHESIOLOGY

## 2023-04-21 PROCEDURE — 93010 ELECTROCARDIOGRAM REPORT: CPT | Mod: 59 | Performed by: INTERNAL MEDICINE

## 2023-04-21 RX ORDER — SODIUM CHLORIDE, SODIUM LACTATE, POTASSIUM CHLORIDE, CALCIUM CHLORIDE 600; 310; 30; 20 MG/100ML; MG/100ML; MG/100ML; MG/100ML
INJECTION, SOLUTION INTRAVENOUS CONTINUOUS
Status: DISCONTINUED | OUTPATIENT
Start: 2023-04-21 | End: 2023-04-21 | Stop reason: HOSPADM

## 2023-04-21 RX ORDER — DIPHENHYDRAMINE HYDROCHLORIDE 50 MG/ML
12.5 INJECTION INTRAMUSCULAR; INTRAVENOUS
Status: DISCONTINUED | OUTPATIENT
Start: 2023-04-21 | End: 2023-04-21 | Stop reason: HOSPADM

## 2023-04-21 RX ORDER — MEPERIDINE HYDROCHLORIDE 25 MG/ML
12.5 INJECTION INTRAMUSCULAR; INTRAVENOUS; SUBCUTANEOUS
Status: DISCONTINUED | OUTPATIENT
Start: 2023-04-21 | End: 2023-04-21 | Stop reason: HOSPADM

## 2023-04-21 RX ORDER — HALOPERIDOL 5 MG/ML
1 INJECTION INTRAMUSCULAR
Status: DISCONTINUED | OUTPATIENT
Start: 2023-04-21 | End: 2023-04-21 | Stop reason: HOSPADM

## 2023-04-21 RX ORDER — ONDANSETRON 2 MG/ML
4 INJECTION INTRAMUSCULAR; INTRAVENOUS
Status: DISCONTINUED | OUTPATIENT
Start: 2023-04-21 | End: 2023-04-21 | Stop reason: HOSPADM

## 2023-04-21 RX ORDER — MIDAZOLAM HYDROCHLORIDE 1 MG/ML
1 INJECTION INTRAMUSCULAR; INTRAVENOUS
Status: DISCONTINUED | OUTPATIENT
Start: 2023-04-21 | End: 2023-04-21 | Stop reason: HOSPADM

## 2023-04-21 RX ADMIN — PROPOFOL 50 MG: 10 INJECTION, EMULSION INTRAVENOUS at 11:21

## 2023-04-21 RX ADMIN — SODIUM CHLORIDE, POTASSIUM CHLORIDE, SODIUM LACTATE AND CALCIUM CHLORIDE: 600; 310; 30; 20 INJECTION, SOLUTION INTRAVENOUS at 11:15

## 2023-04-21 RX ADMIN — PROPOFOL 100 MG: 10 INJECTION, EMULSION INTRAVENOUS at 11:19

## 2023-04-21 ASSESSMENT — FIBROSIS 4 INDEX: FIB4 SCORE: 1.24

## 2023-04-21 ASSESSMENT — PAIN SCALES - GENERAL: PAIN_LEVEL: 0

## 2023-04-21 ASSESSMENT — PAIN DESCRIPTION - PAIN TYPE: TYPE: SURGICAL PAIN

## 2023-04-21 NOTE — ANESTHESIA TIME REPORT
Anesthesia Start and Stop Event Times     Date Time Event    4/21/2023 1106 Ready for Procedure     1115 Anesthesia Start     1133 Anesthesia Stop        Responsible Staff  04/21/23    Name Role Begin End    Zhou Milner M.D. Anesth 1115 1133        Overtime Reason:  no overtime (within assigned shift)    Comments:

## 2023-04-21 NOTE — PATIENT COMMUNICATION
S/w DA in clinic. Per PEYTON pt still in Afib. Pt to report for cardioversion as scheduled. Pt notified per Emelia Hansen who spoke to pt via telephone. Per Emelia pt verbalized understanding.

## 2023-04-21 NOTE — OR NURSING
Patient AAOx4, calm, denies any discomfort post BREANA cardioversion. VSS, afebrile, 100% paced rhythm 60. 12 lead EKG complete. Patient tolerating water and sprite, no nausea. Wife updated on status and plan of care.

## 2023-04-21 NOTE — PROCEDURES
Electrical Cardioversion    Date/Time: 4/21/2023 11:31 AM  Performed by: Epifanio Munoz M.D.  Authorized by: Epifanio Munoz M.D.     Consent:     Consent obtained:  Written    Consent given by:  Patient    Risks discussed:  Cutaneous burn, death, induced arrhythmia and pain    Alternatives discussed:  No treatment, anti-coagulation medication and observation  Sedation:     Patient sedated: Yes      Sedation type:  Per anesthesia  Pre-procedure details:     Cardioversion basis:  Elective    Rhythm:  Atrial fibrillation    Anticoagulation status:  Apixaban  Attempt one:     Cardioversion mode:  Synchronous    Waveform:  Biphasic    Shock (Joules):  120    Shock outcome:  Conversion to normal sinus rhythm  Post-procedure details:     Patient status:  Awake    Patient tolerance of procedure:  Tolerated well, no immediate complications

## 2023-04-21 NOTE — OR NURSING
Assume care for pt in pre-op. Patient allergies and NPO status verified. Belongings secured. Patient verbalizes understanding of pain scale, expected course of stay and plan of care. Surgical site verified with patient. IV access established. Blood samples sent to lab for cbc, cmp and pt/inr. Ekg pending. Call light within reach. No further needs at this time. Hourly rounding in place.

## 2023-04-21 NOTE — ANESTHESIA POSTPROCEDURE EVALUATION
Patient: Giovany Kruger    Procedure Summary     Date: 04/21/23 Room / Location: [facility] ECHOCARDIOLOGY [facility]    Anesthesia Start: 1115 Anesthesia Stop: 1133    Procedure: CL-CARDIOVERSION Diagnosis:       Paroxysmal atrial fibrillation (HCC)      Paroxysmal atrial fibrillation    Scheduled Providers: Epifanio Munoz M.D.; Zhou Milner M.D. Responsible Provider: Zhou Milner M.D.    Anesthesia Type: general ASA Status: 3          Final Anesthesia Type: general  Last vitals  BP   Blood Pressure : 129/68    Temp   36 °C (96.8 °F)    Pulse   (!) 50   Resp   18    SpO2   96 %      Anesthesia Post Evaluation    Patient location during evaluation: PACU  Patient participation: complete - patient participated  Level of consciousness: awake and alert  Pain score: 0    Airway patency: patent  Anesthetic complications: no  Cardiovascular status: hemodynamically stable  Respiratory status: acceptable  Hydration status: euvolemic    PONV: none          No notable events documented.     Nurse Pain Score: 0 (NPRS)

## 2023-04-21 NOTE — DISCHARGE INSTRUCTIONS
HOME CARE INSTRUCTIONS    ACTIVITY: Rest and take it easy for the first 24 hours.  A responsible adult is recommended to remain with you during that time.  It is normal to feel sleepy.  We encourage you to not do anything that requires balance, judgment or coordination.    FOR 24 HOURS DO NOT:  Drive, operate machinery or run household appliances.  Drink beer or alcoholic beverages.  Make important decisions or sign legal documents.    SPECIAL INSTRUCTIONS:   Electrical Cardioversion, Care After  This sheet gives you information about how to care for yourself after your procedure. Your health care provider may also give you more specific instructions. If you have problems or questions, contact your health care provider.  What can I expect after the procedure?  After the procedure, it is common to have:  Some redness on the skin where the shocks were given.  Follow these instructions at home:  Do not drive for 24 hours if you were given a medicine to help you relax (sedative).  Take over-the-counter and prescription medicines only as told by your health care provider.  Ask your health care provider how to check your pulse. Check it often.  Rest for 48 hours after the procedure or as told by your health care provider.  Avoid or limit your caffeine use as told by your health care provider.  Contact a health care provider if:  You feel like your heart is beating too quickly or your pulse is not regular.  You have a serious muscle cramp that does not go away.  Get help right away if:  You have discomfort in your chest.  You are dizzy or you feel faint.  You have trouble breathing or you are short of breath.  Your speech is slurred.  You have trouble moving an arm or leg on one side of your body.  Your fingers or toes turn cold or blue.        DIET: To avoid nausea, slowly advance diet as tolerated, avoiding spicy or greasy foods for the first day.  Add more substantial food to your diet according to your physician's  instructions.  Babies can be fed formula or breast milk as soon as they are hungry.  INCREASE FLUIDS AND FIBER TO AVOID CONSTIPATION.    SURGICAL DRESSING/BATHING: treat any redness from pads like a sunburn, you may put lotion or aloe vera on    MEDICATIONS: Resume taking daily medication.  Take prescribed pain medication with food.  If no medication is prescribed, you may take non-aspirin pain medication if needed.  PAIN MEDICATION CAN BE VERY CONSTIPATING.  Take a stool softener or laxative such as senokot, pericolace, or milk of magnesia if needed.    A follow-up appointment should be arranged with your doctor; call to schedule.    You should CALL YOUR PHYSICIAN if you develop:  Fever greater than 101 degrees F.  Pain not relieved by medication, or persistent nausea or vomiting.  Excessive bleeding (blood soaking through dressing) or unexpected drainage from the wound.  Extreme redness or swelling around the incision site, drainage of pus or foul smelling drainage.  Inability to urinate or empty your bladder within 8 hours.  Problems with breathing or chest pain.    You should call 911 if you develop problems with breathing or chest pain.  If you are unable to contact your doctor or surgical center, you should go to the nearest emergency room or urgent care center.  Physician's telephone #: [phone] Dr Munoz    MILD FLU-LIKE SYMPTOMS ARE NORMAL.  YOU MAY EXPERIENCE GENERALIZED MUSCLE ACHES, THROAT IRRITATION, HEADACHE AND/OR SOME NAUSEA.    If any questions arise, call your doctor.  If your doctor is not available, please feel free to call the Surgical Center at [phone].  The Center is open Monday through Friday from 7AM to 7PM.      A registered nurse may call you a few days after your surgery to see how you are doing after your procedure.    You may also receive a survey in the mail within the next two weeks and we ask that you take a few moments to complete the survey and return it to us.  Our goal  is to provide you with very good care and we value your comments.     Depression / Suicide Risk    As you are discharged from this [facility] facility, it is important to learn how to keep safe from harming yourself.    Recognize the warning signs:  Abrupt changes in personality, positive or negative- including increase in energy   Giving away possessions  Change in eating patterns- significant weight changes-  positive or negative  Change in sleeping patterns- unable to sleep or sleeping all the time   Unwillingness or inability to communicate  Depression  Unusual sadness, discouragement and loneliness  Talk of wanting to die  Neglect of personal appearance   Rebelliousness- reckless behavior  Withdrawal from people/activities they love  Confusion- inability to concentrate     If you or a loved one observes any of these behaviors or has concerns about self-harm, here's what you can do:  Talk about it- your feelings and reasons for harming yourself  Remove any means that you might use to hurt yourself (examples: pills, rope, extension cords, firearm)  Get professional help from the community (Mental Health, Substance Abuse, psychological counseling)  Do not be alone:Call your Safe Contact- someone whom you trust who will be there for you.  Call your local CRISIS HOTLINE 517-1112 or 028-705-1595  Call your local Children's Mobile Crisis Response Team [place] (851) 618-7553 or www.[employer]  Call the toll free National Suicide Prevention Hotlines   National Suicide Prevention Lifeline 845-095-YBRX (0362)  National Hope Line Network 800-SUICIDE (405-5677)    I acknowledge receipt and understanding of these Home Care instructions.

## 2023-04-21 NOTE — ANESTHESIA PREPROCEDURE EVALUATION
Date/Time: 04/21/23 1100    Scheduled providers: Epifanio Munoz M.D.; Zhou Milner M.D.    Procedure: CL-CARDIOVERSION    Diagnosis:       Paroxysmal atrial fibrillation (HCC) [I48.0]      Paroxysmal atrial fibrillation [I48.0]    Location: [facility] IM[facility] - [facility]ARDIOLOGY [facility]          Relevant Problems   PULMONARY   (positive) Mild persistent asthma without complication   (positive) SOB (shortness of breath)      CARDIAC   (positive) Aortic ectasia, thoracic (HCC)   (positive) Atrial fibrillation, amio 2011   (positive) Essential hypertension      ENDO   (positive) Hypothyroid       Physical Exam    Airway   Mallampati: II  TM distance: >3 FB  Neck ROM: full       Cardiovascular - normal exam  Rhythm: irregular  Rate: normal  (-) murmur     Dental - normal exam           Pulmonary - normal exam  Breath sounds clear to auscultation     Abdominal    Neurological - normal exam                 Anesthesia Plan    ASA 3 (chart)       Plan - general       Airway plan will be natural airway          Induction: intravenous    Postoperative Plan: Postoperative administration of opioids is intended.    Pertinent diagnostic labs and testing reviewed    Informed Consent:    Anesthetic plan and risks discussed with patient.    Use of blood products discussed with: patient whom consented to blood products.

## 2023-04-26 ENCOUNTER — PATIENT MESSAGE (OUTPATIENT)
Dept: CARDIOLOGY | Facility: MEDICAL CENTER | Age: 78
End: 2023-04-26
Payer: MEDICARE

## 2023-04-27 ENCOUNTER — PATIENT MESSAGE (OUTPATIENT)
Dept: CARDIOLOGY | Facility: MEDICAL CENTER | Age: 78
End: 2023-04-27
Payer: MEDICARE

## 2023-05-03 ENCOUNTER — NON-PROVIDER VISIT (OUTPATIENT)
Dept: CARDIOLOGY | Facility: MEDICAL CENTER | Age: 78
End: 2023-05-03
Payer: MEDICARE

## 2023-05-03 ENCOUNTER — NON-PROVIDER VISIT (OUTPATIENT)
Dept: CARDIOLOGY | Facility: MEDICAL CENTER | Age: 78
End: 2023-05-03

## 2023-05-03 ENCOUNTER — TELEPHONE (OUTPATIENT)
Dept: CARDIOLOGY | Facility: MEDICAL CENTER | Age: 78
End: 2023-05-03

## 2023-05-03 DIAGNOSIS — I49.5 SICK SINUS SYNDROME (HCC): ICD-10-CM

## 2023-05-03 DIAGNOSIS — Z95.0 CARDIAC PACEMAKER IN SITU: ICD-10-CM

## 2023-05-03 DIAGNOSIS — I44.0 AV BLOCK, 1ST DEGREE: ICD-10-CM

## 2023-05-03 PROCEDURE — 93279 PRGRMG DEV EVAL PM/LDLS PM: CPT | Performed by: INTERNAL MEDICINE

## 2023-05-03 NOTE — TELEPHONE ENCOUNTER
FYI--patient seen in clinic today.  Device is functioning appropriately.  Patient in persistent AF since 4/23--he is s/p cardioversion 4/21.      Patient states he is sob.

## 2023-05-04 NOTE — TELEPHONE ENCOUNTER
Called pt [phone]  Pt reports no CP/pressure/palpitations at this time. Pt reports increased SOB with and without activity. Pt states his HR has been ranging between 65-85 bpm. Advised pt to continue all medications as prescribed unless he hears from DA. Pt verbalized understanding.

## 2023-05-08 NOTE — CARDIAC REMOTE MONITOR - SCAN
In office interrogation reviewed. Device demonstrated appropriate function.       Electronically Signed by: Mathew Santa M.D.    5/11/2023  8:15 AM

## 2023-05-09 ENCOUNTER — PATIENT MESSAGE (OUTPATIENT)
Dept: CARDIOLOGY | Facility: MEDICAL CENTER | Age: 78
End: 2023-05-09
Payer: MEDICARE

## 2023-06-08 ENCOUNTER — NON-PROVIDER VISIT (OUTPATIENT)
Dept: CARDIOLOGY | Facility: MEDICAL CENTER | Age: 78
End: 2023-06-08
Payer: MEDICARE

## 2023-06-08 PROCEDURE — 93294 REM INTERROG EVL PM/LDLS PM: CPT | Performed by: INTERNAL MEDICINE

## 2023-06-08 NOTE — CARDIAC REMOTE MONITOR - SCAN
Device transmission reviewed. Device demonstrated appropriate function.       Electronically Signed by: Zoya Cerna M.D.    6/10/2023  11:35 AM

## 2023-06-14 ENCOUNTER — OFFICE VISIT (OUTPATIENT)
Dept: SLEEP MEDICINE | Facility: MEDICAL CENTER | Age: 78
End: 2023-06-14
Attending: INTERNAL MEDICINE
Payer: MEDICARE

## 2023-06-14 VITALS
HEART RATE: 83 BPM | RESPIRATION RATE: 20 BRPM | WEIGHT: 192 LBS | SYSTOLIC BLOOD PRESSURE: 118 MMHG | BODY MASS INDEX: 24.64 KG/M2 | OXYGEN SATURATION: 97 % | DIASTOLIC BLOOD PRESSURE: 68 MMHG | HEIGHT: 74 IN

## 2023-06-14 DIAGNOSIS — J45.30 MILD PERSISTENT ASTHMA WITHOUT COMPLICATION: ICD-10-CM

## 2023-06-14 DIAGNOSIS — I48.91 ATRIAL FIBRILLATION, UNSPECIFIED TYPE (HCC): ICD-10-CM

## 2023-06-14 DIAGNOSIS — R91.8 PULMONARY NODULES: ICD-10-CM

## 2023-06-14 PROCEDURE — 99212 OFFICE O/P EST SF 10 MIN: CPT | Performed by: INTERNAL MEDICINE

## 2023-06-14 PROCEDURE — 3078F DIAST BP <80 MM HG: CPT | Performed by: INTERNAL MEDICINE

## 2023-06-14 PROCEDURE — 3074F SYST BP LT 130 MM HG: CPT | Performed by: INTERNAL MEDICINE

## 2023-06-14 PROCEDURE — 99214 OFFICE O/P EST MOD 30 MIN: CPT | Performed by: INTERNAL MEDICINE

## 2023-06-14 ASSESSMENT — ENCOUNTER SYMPTOMS
PHOTOPHOBIA: 0
STRIDOR: 0
DIAPHORESIS: 0
ORTHOPNEA: 0
SORE THROAT: 0
EYE REDNESS: 0
NECK PAIN: 0
HEMOPTYSIS: 0
EYE PAIN: 0
NAUSEA: 0
WHEEZING: 0
DIZZINESS: 0
HEARTBURN: 0
CHILLS: 0
WEIGHT LOSS: 0
WEAKNESS: 0
FOCAL WEAKNESS: 0
VOMITING: 0
MYALGIAS: 0
CLAUDICATION: 0
COUGH: 0
SPUTUM PRODUCTION: 0
CONSTIPATION: 0
HEADACHES: 0
DOUBLE VISION: 0
FALLS: 0
DIARRHEA: 0
SPEECH CHANGE: 0
BACK PAIN: 0
EYE DISCHARGE: 0
SINUS PAIN: 0
ABDOMINAL PAIN: 0
PND: 0
PALPITATIONS: 0
FEVER: 0
BLURRED VISION: 0
TREMORS: 0
SHORTNESS OF BREATH: 1
DEPRESSION: 0

## 2023-06-14 ASSESSMENT — FIBROSIS 4 INDEX: FIB4 SCORE: 1.31

## 2023-06-14 NOTE — Clinical Note
Devante Lowe, I saw Fabio today in pulmonary. He is still c/o SOB. I know you recently started flecainide and metoprolol. Your note says she has been episodes of high HR but he tells me today that when trying to walk on the treadmill or use his Airdyne bike, his HR stays flat in the mid-70s with effort. SpO2 above 93%. I know he has a pacemaker now but wasn't sure if he is 100% paced? HR was regular today and right around 80. I told him I would reach out to you to make sure he is able to increase HR appropriately with exercise. Thanks! Dorothy

## 2023-06-14 NOTE — PROGRESS NOTES
"Chief Complaint   Patient presents with    Asthma     Mild persistent asthma without complication         HPI: This patient is a 78 y.o. male whom is followed in our clinic for asthma and GAY last seen by me on 1/12/23.  He is followed by cardiology for AF s/p recent attempt at cardioversion which was not successful now newly on flecainide and metoprolol. He is also s/p recent pacemaker placement. Asthma is long-standing and sxs include wheezing and cough both of which were controlled on low-dose ISC/LABA with symbicort 80 w/o rescue inhaler use or tx for acute exacerbation. PFTs  in 2020 were essentially normal. The pt did have 5 mm LLL nodule on CT in 2019 for which we did a repeat CT in March 2022 and showed stable LLL nodule, stable RLL 6 mm nodule not commented on in 2019 but present when reviewed by me. Mild scarring in RLL also stable.  Updated pulmonary function testing in August 2022 showed normal airflows with normal lung volumes however there is air trapping and preserved diffusion capacity at 91% predicted.  No bronchodilator response. His main concern has been GAY for the past year. He tried to complete treadmill stress test which was submaximal due to having to stop for GAY. HR increased to 93 and SpO2 was 93-94%. NO chest pain or ischemic changes on ECG. We empircally increased symbicort to 160 was helpful at first however GAY continues. We ordered CPET which showed blunted HR response and low BP with exercise. SpO2 were not accurate during his testing however he did not desaturate on treadmill stress test and does not desaturate when he checks SpO2 at home while exercising. He does report a \"flat\" HR remaining in the 70s with exercise. He is following closely with Dr. Munoz in cardiology. No wheezing or cough. ZENA does not help his sx.     Past Medical History:   Diagnosis Date    Arrhythmia     ASTHMA     Atrial fibrillation (HCC)     Back pain     Blood clotting disorder (HCC) I take Eliquis blood " thinner    Breath shortness For several years    Chickenpox     Detached retina, left 2023    [place] measles     Heart murmur If related to afib    Heart valve disease Afib diagnosed several years ago    Hemorrhagic disorder (HCC)     On Eliquis    Hiatus hernia syndrome several years ago, but not sure what Hiatal means    High cholesterol About 1 year ago    Hypertension     Pacemaker     Peripheral vascular disease, unspecified (HCC)     Stroke (HCC)     Tonsillitis     Unspecified disorder of thyroid        Social History     Socioeconomic History    Marital status:      Spouse name: Not on file    Number of children: Not on file    Years of education: Not on file    Highest education level: Not on file   Occupational History    Not on file   Tobacco Use    Smoking status: Former     Packs/day: 1.00     Years: 20.00     Pack years: 20.00     Types: Cigarettes     Start date: 1960     Quit date: 1980     Years since quittin.4     Passive exposure: Never    Smokeless tobacco: Never   Vaping Use    Vaping Use: Never used   Substance and Sexual Activity    Alcohol use: Yes     Alcohol/week: 4.2 - 16.8 oz     Types: 7 Standard drinks or equivalent per week     Comment: 7 DRINKS PER WEEK    Drug use: Never     Types: Marijuana, Oral     Comment: has not used 60 years ago     Sexual activity: Not on file   Other Topics Concern    Not on file   Social History Narrative    Not on file     Social Determinants of Health     Financial Resource Strain: Not on file   Food Insecurity: Not on file   Transportation Needs: Not on file   Physical Activity: Not on file   Stress: Not on file   Social Connections: Not on file   Intimate Partner Violence: Not on file   Housing Stability: Not on file       Family History   Problem Relation Age of Onset    Cancer Other     Diabetes Mother     No Known Problems Father     Lung Disease Sister     Cancer Brother        Current Outpatient Medications on File  Prior to Visit   Medication Sig Dispense Refill    flecainide (TAMBOCOR) 150 MG Tab Take 1 Tablet by mouth 2 times a day. 180 Tablet 3    metoprolol SR (TOPROL XL) 25 MG TABLET SR 24 HR Take 1 Tablet by mouth every day. 90 Tablet 3    ELIQUIS 5 MG Tab TAKE 1 TABLET TWICE A  Tablet 1    rosuvastatin (CRESTOR) 10 MG Tab TAKE 1 TABLET EVERY EVENING 90 Tablet 1    budesonide-formoterol (SYMBICORT) 160-4.5 MCG/ACT Aerosol Inhale 2 Puffs 2 times a day. Use with spacer.  Rinse mouth after each use. 3 Each 3    tamsulosin (FLOMAX) 0.4 MG capsule Take 0.4 mg by mouth every evening.      losartan-hydrochlorothiazide (HYZAAR) 50-12.5 MG per tablet Take 1 Tablet by mouth every day. 90 Tablet 7    Omega-3 Fatty Acids (FISH OIL PO) Take 1 Capsule by mouth every day.      Ferrous Sulfate (IRON) 325 (65 Fe) MG Tab Take 325 mg by mouth every day.      levothyroxine (SYNTHROID) 88 MCG Tab Take 88 mcg by mouth Every morning on an empty stomach.      Glucosamine-Chondroit-Vit C-Mn (GLUCOSAMINE 1500 COMPLEX) Cap Take 1 Capsule by mouth every day.       No current facility-administered medications on file prior to visit.       Amiodarone and Tape      ROS:   Review of Systems   Constitutional:  Negative for chills, diaphoresis, fever, malaise/fatigue and weight loss.   HENT:  Negative for congestion, ear discharge, ear pain, hearing loss, nosebleeds, sinus pain, sore throat and tinnitus.    Eyes:  Negative for blurred vision, double vision, photophobia, pain, discharge and redness.   Respiratory:  Positive for shortness of breath. Negative for cough, hemoptysis, sputum production, wheezing and stridor.    Cardiovascular:  Negative for chest pain, palpitations, orthopnea, claudication, leg swelling and PND.   Gastrointestinal:  Negative for abdominal pain, constipation, diarrhea, heartburn, nausea and vomiting.   Genitourinary:  Negative for dysuria and urgency.   Musculoskeletal:  Negative for back pain, falls, joint pain,  "myalgias and neck pain.   Skin:  Negative for itching and rash.   Neurological:  Negative for dizziness, tremors, speech change, focal weakness, weakness and headaches.   Endo/Heme/Allergies:  Negative for environmental allergies.   Psychiatric/Behavioral:  Negative for depression.        /68   Pulse 83   Resp 20   Ht 1.88 m (6' 2\")   Wt 87.1 kg (192 lb)   SpO2 97%   Physical Exam  Vitals reviewed.   Constitutional:       General: He is not in acute distress.     Appearance: Normal appearance. He is normal weight.   HENT:      Head: Normocephalic and atraumatic.      Right Ear: External ear normal.      Left Ear: External ear normal.      Nose: Nose normal. No congestion.      Mouth/Throat:      Mouth: Mucous membranes are moist.      Pharynx: Oropharynx is clear. No oropharyngeal exudate.   Eyes:      General: No scleral icterus.     Extraocular Movements: Extraocular movements intact.      Conjunctiva/sclera: Conjunctivae normal.      Pupils: Pupils are equal, round, and reactive to light.   Pulmonary:      Effort: Pulmonary effort is normal. No respiratory distress.      Breath sounds: Normal breath sounds. No wheezing or rales.   Abdominal:      General: There is no distension.      Palpations: Abdomen is soft.   Neurological:      Mental Status: He is alert.       PFTs as reviewed by me personally: as per hPI    Imaging as reviewed by me personally:  as per hPI    Assessment:  1. Mild persistent asthma without complication  PULMONARY FUNCTION TESTS -Test requested: Complete Pulmonary Function Test      2. Atrial fibrillation, unspecified type (HCC)        3. Pulmonary nodules            Plan:  Chronic mild, well controlled. I do not think his GAY is pulmoanry. Continue symbicort but decrease to 80 and continue prn Aleks; update PFT prior to next visit  RRR today but his issue sound more one of chronotropic incompetence? I will CC cardiology and he will f/u with them.  Stable; no clear indication for " ongoing surveillance  Return in about 6 months (around 12/14/2023) for PULMONARY FUNCTION.

## 2023-06-21 ENCOUNTER — OFFICE VISIT (OUTPATIENT)
Dept: CARDIOLOGY | Facility: MEDICAL CENTER | Age: 78
End: 2023-06-21
Attending: INTERNAL MEDICINE
Payer: MEDICARE

## 2023-06-21 ENCOUNTER — TELEPHONE (OUTPATIENT)
Dept: CARDIOLOGY | Facility: MEDICAL CENTER | Age: 78
End: 2023-06-21
Payer: MEDICARE

## 2023-06-21 VITALS
RESPIRATION RATE: 18 BRPM | SYSTOLIC BLOOD PRESSURE: 112 MMHG | HEART RATE: 70 BPM | HEIGHT: 74 IN | OXYGEN SATURATION: 98 % | WEIGHT: 187 LBS | DIASTOLIC BLOOD PRESSURE: 66 MMHG | BODY MASS INDEX: 24 KG/M2

## 2023-06-21 DIAGNOSIS — I48.0 HYPERCOAGULABLE STATE DUE TO PAROXYSMAL ATRIAL FIBRILLATION (HCC): ICD-10-CM

## 2023-06-21 DIAGNOSIS — I48.0 PAROXYSMAL ATRIAL FIBRILLATION (HCC): ICD-10-CM

## 2023-06-21 DIAGNOSIS — R00.1 SYMPTOMATIC BRADYCARDIA: ICD-10-CM

## 2023-06-21 DIAGNOSIS — Z79.01 CHRONIC ANTICOAGULATION: ICD-10-CM

## 2023-06-21 DIAGNOSIS — I48.19 PERSISTENT ATRIAL FIBRILLATION (HCC): ICD-10-CM

## 2023-06-21 DIAGNOSIS — Z95.0 CARDIAC PACEMAKER IN SITU: ICD-10-CM

## 2023-06-21 DIAGNOSIS — I50.9 CONGESTIVE HEART FAILURE, UNSPECIFIED HF CHRONICITY, UNSPECIFIED HEART FAILURE TYPE (HCC): ICD-10-CM

## 2023-06-21 DIAGNOSIS — E03.8 OTHER SPECIFIED HYPOTHYROIDISM: ICD-10-CM

## 2023-06-21 DIAGNOSIS — I10 ESSENTIAL HYPERTENSION: ICD-10-CM

## 2023-06-21 DIAGNOSIS — D68.69 HYPERCOAGULABLE STATE DUE TO PAROXYSMAL ATRIAL FIBRILLATION (HCC): ICD-10-CM

## 2023-06-21 PROCEDURE — 3078F DIAST BP <80 MM HG: CPT | Performed by: INTERNAL MEDICINE

## 2023-06-21 PROCEDURE — 93280 PM DEVICE PROGR EVAL DUAL: CPT | Performed by: INTERNAL MEDICINE

## 2023-06-21 PROCEDURE — 3074F SYST BP LT 130 MM HG: CPT | Performed by: INTERNAL MEDICINE

## 2023-06-21 PROCEDURE — 99214 OFFICE O/P EST MOD 30 MIN: CPT | Mod: 25 | Performed by: INTERNAL MEDICINE

## 2023-06-21 PROCEDURE — 99213 OFFICE O/P EST LOW 20 MIN: CPT | Performed by: INTERNAL MEDICINE

## 2023-06-21 PROCEDURE — 99212 OFFICE O/P EST SF 10 MIN: CPT | Performed by: INTERNAL MEDICINE

## 2023-06-21 PROCEDURE — 93280 PM DEVICE PROGR EVAL DUAL: CPT | Mod: 26 | Performed by: INTERNAL MEDICINE

## 2023-06-21 RX ORDER — FUROSEMIDE 20 MG/1
20 TABLET ORAL DAILY
Qty: 90 TABLET | Refills: 3 | Status: SHIPPED | OUTPATIENT
Start: 2023-06-21 | End: 2023-09-13

## 2023-06-21 RX ORDER — METOPROLOL SUCCINATE 25 MG/1
12.5 TABLET, EXTENDED RELEASE ORAL DAILY
Qty: 90 TABLET | Refills: 3 | Status: ON HOLD | OUTPATIENT
Start: 2023-06-21 | End: 2023-08-25

## 2023-06-21 RX ORDER — POTASSIUM CHLORIDE 750 MG/1
20 TABLET, EXTENDED RELEASE ORAL 2 TIMES DAILY
Qty: 90 TABLET | Refills: 3 | Status: SHIPPED | OUTPATIENT
Start: 2023-06-21 | End: 2023-09-13

## 2023-06-21 ASSESSMENT — FIBROSIS 4 INDEX: FIB4 SCORE: 1.17

## 2023-06-22 ENCOUNTER — HOSPITAL ENCOUNTER (OUTPATIENT)
Dept: CARDIOLOGY | Facility: MEDICAL CENTER | Age: 78
End: 2023-06-22
Attending: INTERNAL MEDICINE
Payer: MEDICARE

## 2023-06-22 DIAGNOSIS — R00.1 SYMPTOMATIC BRADYCARDIA: ICD-10-CM

## 2023-06-22 LAB
LV EJECT FRACT  99904: 50
LV EJECT FRACT MOD 2C 99903: 70.02
LV EJECT FRACT MOD 4C 99902: 47.15
LV EJECT FRACT MOD BP 99901: 50.29

## 2023-06-22 PROCEDURE — 93306 TTE W/DOPPLER COMPLETE: CPT

## 2023-06-22 PROCEDURE — 93306 TTE W/DOPPLER COMPLETE: CPT | Mod: 26 | Performed by: INTERNAL MEDICINE

## 2023-06-23 ENCOUNTER — PATIENT MESSAGE (OUTPATIENT)
Dept: CARDIOLOGY | Facility: MEDICAL CENTER | Age: 78
End: 2023-06-23
Payer: MEDICARE

## 2023-06-26 ENCOUNTER — PATIENT MESSAGE (OUTPATIENT)
Dept: CARDIOLOGY | Facility: MEDICAL CENTER | Age: 78
End: 2023-06-26
Payer: MEDICARE

## 2023-06-27 NOTE — PATIENT COMMUNICATION
Per telephone Encounter on 6/21/23 Tamar attempted to call patient to schedule procedure, voicemail was left

## 2023-06-27 NOTE — TELEPHONE ENCOUNTER
Patient is scheduled on 8-24-23 for an Afib ablation w/BREANA w/anesthesia with . Patient was told to hold lasix AM day of procedure and to check in at 6:30 for an 8:30 procedure. Updated H&P to be done on admit by EP NP. Pre admit to call patient. Tanja Ye notified to be at procedure on 6-27-23.

## 2023-07-10 ENCOUNTER — TELEPHONE (OUTPATIENT)
Dept: CARDIOLOGY | Facility: MEDICAL CENTER | Age: 78
End: 2023-07-10
Payer: MEDICARE

## 2023-07-10 NOTE — PATIENT COMMUNICATION
Mathew Santa M.D. 3 days ago     To me the echo looks fine                MyChart message sent to patient regarding DS note

## 2023-07-10 NOTE — TELEPHONE ENCOUNTER
Patient is scheduled on 8-24-23 for an Afib ablation w/BREANA w/anesthesia with Dr. Santa. No meds to stop per . Patient to check in at 6:30 for an 8:30 procedure. Updated H&P to be done on admit by EP NP. Pre admit to call patient.Blessing notified on 7-10-23 by email. Tanja Ye notified on 6-27-23 by phone.

## 2023-07-12 ENCOUNTER — APPOINTMENT (OUTPATIENT)
Dept: ADMISSIONS | Facility: MEDICAL CENTER | Age: 78
DRG: 274 | End: 2023-07-12
Attending: INTERNAL MEDICINE
Payer: MEDICARE

## 2023-08-10 ENCOUNTER — PRE-ADMISSION TESTING (OUTPATIENT)
Dept: ADMISSIONS | Facility: MEDICAL CENTER | Age: 78
DRG: 274 | End: 2023-08-10
Attending: INTERNAL MEDICINE
Payer: MEDICARE

## 2023-08-15 ENCOUNTER — OFFICE VISIT (OUTPATIENT)
Dept: CARDIOLOGY | Facility: MEDICAL CENTER | Age: 78
End: 2023-08-15
Attending: INTERNAL MEDICINE
Payer: MEDICARE

## 2023-08-15 VITALS
DIASTOLIC BLOOD PRESSURE: 60 MMHG | BODY MASS INDEX: 24 KG/M2 | HEART RATE: 71 BPM | OXYGEN SATURATION: 95 % | SYSTOLIC BLOOD PRESSURE: 104 MMHG | WEIGHT: 187 LBS | RESPIRATION RATE: 16 BRPM | HEIGHT: 74 IN

## 2023-08-15 DIAGNOSIS — I48.0 HYPERCOAGULABLE STATE DUE TO PAROXYSMAL ATRIAL FIBRILLATION (HCC): ICD-10-CM

## 2023-08-15 DIAGNOSIS — I10 ESSENTIAL HYPERTENSION: ICD-10-CM

## 2023-08-15 DIAGNOSIS — Z79.01 CHRONIC ANTICOAGULATION: ICD-10-CM

## 2023-08-15 DIAGNOSIS — D68.69 HYPERCOAGULABLE STATE DUE TO PAROXYSMAL ATRIAL FIBRILLATION (HCC): ICD-10-CM

## 2023-08-15 DIAGNOSIS — R00.1 SYMPTOMATIC BRADYCARDIA: ICD-10-CM

## 2023-08-15 DIAGNOSIS — I48.19 PERSISTENT ATRIAL FIBRILLATION (HCC): ICD-10-CM

## 2023-08-15 DIAGNOSIS — Z95.0 CARDIAC PACEMAKER IN SITU: ICD-10-CM

## 2023-08-15 PROCEDURE — 3078F DIAST BP <80 MM HG: CPT | Performed by: INTERNAL MEDICINE

## 2023-08-15 PROCEDURE — 3074F SYST BP LT 130 MM HG: CPT | Performed by: INTERNAL MEDICINE

## 2023-08-15 PROCEDURE — 99214 OFFICE O/P EST MOD 30 MIN: CPT | Mod: 25 | Performed by: INTERNAL MEDICINE

## 2023-08-15 PROCEDURE — 99213 OFFICE O/P EST LOW 20 MIN: CPT | Performed by: INTERNAL MEDICINE

## 2023-08-15 PROCEDURE — 93280 PM DEVICE PROGR EVAL DUAL: CPT | Mod: 26 | Performed by: INTERNAL MEDICINE

## 2023-08-15 PROCEDURE — 93280 PM DEVICE PROGR EVAL DUAL: CPT | Performed by: INTERNAL MEDICINE

## 2023-08-15 ASSESSMENT — FIBROSIS 4 INDEX: FIB4 SCORE: 1.17

## 2023-08-15 NOTE — PROGRESS NOTES
Chief Complaint   Patient presents with    Bradycardia     Symptomatic bradycardia    Atrial Fibrillation     Paroxysmal atrial fibrillation (HCC)       Subjective     Fabio John Kruger is a 78 y.o. male who presents today with persistent atrial fibrillation previously paroxysmal.  Fatigue and shortness of breath scheduled for catheter ablation.  Intolerant to amiodarone flecainide.  Scheduled for ablation.  On anticoagulation.    Past Medical History:   Diagnosis Date    Arrhythmia     ASTHMA     Atrial fibrillation (HCC)     Back pain     Blood clotting disorder (HCC) I take Eliquis blood thinner    Breath shortness For several years    Chickenpox     Detached retina, left 03/02/2023    Citizen of [place] measles     Heart murmur If related to afib    Heart valve disease Afib diagnosed several years ago    Hemorrhagic disorder (HCC)     On Eliquis    Hiatus hernia syndrome several years ago, but not sure what Hiatal means    High cholesterol About 1 year ago    Hypertension     Pacemaker     Peripheral vascular disease, unspecified (HCC)     Stroke (HCC) 1980    Tonsillitis     Unspecified disorder of thyroid      Past Surgical History:   Procedure Laterality Date    EYE SURGERY Left 02/27/2023    INGUINAL HERNIA REPAIR  09/05/2012    Performed by RIGOBERTO MCINTYRE at SURGERY [facility]    INGUINAL HERNIA REPAIR  11/03/2011    Performed by RIGOBERTO MCINTYRE at SURGERY [facility] ORS    OTHER  2009    L3-4-5 susion    OTHER  1980    C1-2-3 fusion    TONSILLECTOMY  1952    CARPAL TUNNEL RELEASE       Family History   Problem Relation Age of Onset    Cancer Other     Diabetes Mother     No Known Problems Father     Lung Disease Sister     Cancer Brother      Social History     Socioeconomic History    Marital status:      Spouse name: Not on file    Number of children: Not on file    Years of education: Not on file    Highest education level: Not on file   Occupational History    Not on file   Tobacco Use    Smoking  status: Former     Packs/day: 1.00     Years: 20.00     Pack years: 20.00     Types: Cigarettes     Start date: 1960     Quit date: 1980     Years since quittin.6     Passive exposure: Never    Smokeless tobacco: Never   Vaping Use    Vaping Use: Never used   Substance and Sexual Activity    Alcohol use: Yes     Alcohol/week: 4.2 - 16.8 oz     Types: 7 Standard drinks or equivalent per week     Comment: 7 DRINKS PER WEEK    Drug use: Never     Types: Marijuana, Oral     Comment: has not used 60 years ago     Sexual activity: Not on file   Other Topics Concern    Not on file   Social History Narrative    Not on file     Social Determinants of Health     Financial Resource Strain: Not on file   Food Insecurity: Not on file   Transportation Needs: Not on file   Physical Activity: Not on file   Stress: Not on file   Social Connections: Not on file   Intimate Partner Violence: Not on file   Housing Stability: Not on file     Allergies   Allergen Reactions    Amiodarone      Caused detached retina     Tape Unspecified     Blisters and scarring.  Paper tape    Flecainide      Outpatient Encounter Medications as of 8/15/2023   Medication Sig Dispense Refill    metoprolol SR (TOPROL XL) 25 MG TABLET SR 24 HR Take 0.5 Tablets by mouth every day. 90 Tablet 3    furosemide (LASIX) 20 MG Tab Take 1 Tablet by mouth every day. 90 Tablet 3    potassium chloride SA (K-DUR) 10 MEQ Tab CR Take 2 Tablets by mouth 2 times a day. 90 Tablet 3    ELIQUIS 5 MG Tab TAKE 1 TABLET TWICE A  Tablet 1    rosuvastatin (CRESTOR) 10 MG Tab TAKE 1 TABLET EVERY EVENING 90 Tablet 1    budesonide-formoterol (SYMBICORT) 160-4.5 MCG/ACT Aerosol Inhale 2 Puffs 2 times a day. Use with spacer.  Rinse mouth after each use. 3 Each 3    tamsulosin (FLOMAX) 0.4 MG capsule Take 0.4 mg by mouth every evening.      losartan-hydrochlorothiazide (HYZAAR) 50-12.5 MG per tablet Take 1 Tablet by mouth every day. 90 Tablet 7    Omega-3 Fatty Acids  "(FISH OIL PO) Take 1 Capsule by mouth every day.      Ferrous Sulfate (IRON) 325 (65 Fe) MG Tab Take 325 mg by mouth every day.      levothyroxine (SYNTHROID) 88 MCG Tab Take 88 mcg by mouth Every morning on an empty stomach.      Glucosamine-Chondroit-Vit C-Mn (GLUCOSAMINE 1500 COMPLEX) Cap Take 1 Capsule by mouth every day.       No facility-administered encounter medications on file as of 8/15/2023.     ROS           Objective     /60 (BP Location: Left arm, Patient Position: Sitting, BP Cuff Size: Adult)   Pulse 71   Resp 16   Ht 1.88 m (6' 2\")   Wt 84.8 kg (187 lb)   SpO2 95%   BMI 24.01 kg/m²     Physical Exam  Constitutional:       Appearance: He is well-developed.   HENT:      Head: Normocephalic and atraumatic.   Cardiovascular:      Rate and Rhythm: Normal rate and regular rhythm.      Heart sounds: No murmur heard.     No friction rub. No gallop.   Pulmonary:      Effort: Pulmonary effort is normal.      Breath sounds: Normal breath sounds.   Abdominal:      Palpations: Abdomen is soft.   Musculoskeletal:         General: Normal range of motion.      Cervical back: Normal range of motion and neck supple.   Skin:     General: Skin is warm and dry.   Neurological:      Mental Status: He is alert and oriented to person, place, and time.   Psychiatric:         Behavior: Behavior normal.         Thought Content: Thought content normal.         Judgment: Judgment normal.                Assessment & Plan     1. Hypercoagulable state due to paroxysmal atrial fibrillation (HCC)        2. Cardiac pacemaker in situ        3. Essential hypertension        4. Chronic anticoagulation        5. Symptomatic bradycardia            Medical Decision Making: Today's Assessment/Status/Plan:   1.  Persistent atrial fibrillation for catheter ablation.  The risks, benefits,and alternatives to atrial fibrillation ablation with general anesthesia were discussed in great detail. Specific risks mentioned including " bleeding, infection, arteriovenous fistula/pseudoaneurysm related to sheath placement, cardiac perforation with possible tamponade requiring pericardiocentesis or possible open heart surgery were discussed. In addition,we discussed atrial fibrillation ablation specific complications including pulmonary vein stenosis, left atrial perforation (2-4%), and nerve damage involving the recurrent laryngeal nerve, the vagus nerve with resulting gastroparesis, and the phrenic nerve.  Also mentioned was a 1/400 (0.25%) occurrence of atrial esophageal fistula, which is an abnormal communication between the esophagus and the left atrium; this complication is often fatal. Lastly the risks of death, myocardial infarction, stroke, DVT and PE were discussed. The patient verbalized understanding of these potential complications and wishes to proceed with this procedure.  The risks, benefits, and alternatives to transesophageal echocardiogram with IV sedation were discussed with the patient in specific detail, including oropharyngeal and esophageal traumas including hoarseness and dysphagia after the procedure. Rare cases demonstrating serious or fatal complications associated with transesophageal echocardiogram have been reported in the adult population, including cardiac, pulmonary and bleeding complications in less than 1% of people. Patients with an identified intracardiac thrombus are at increased risk for embolic events and this appears to be reduced with anticoagulant therapy. The patient verbalized understandings about these  possible complications and wishes to proceed with this procedure  2.  Permanent pacemaker.  RV pacing.  If not improved with sinus rhythm consideration for conduction system pacing versus BiV pacing.

## 2023-08-24 ENCOUNTER — ANESTHESIA (OUTPATIENT)
Dept: CARDIOLOGY | Facility: MEDICAL CENTER | Age: 78
DRG: 274 | End: 2023-08-24
Payer: MEDICARE

## 2023-08-24 ENCOUNTER — APPOINTMENT (OUTPATIENT)
Dept: CARDIOLOGY | Facility: MEDICAL CENTER | Age: 78
DRG: 274 | End: 2023-08-24
Attending: INTERNAL MEDICINE
Payer: MEDICARE

## 2023-08-24 ENCOUNTER — ANESTHESIA EVENT (OUTPATIENT)
Dept: CARDIOLOGY | Facility: MEDICAL CENTER | Age: 78
DRG: 274 | End: 2023-08-24
Payer: MEDICARE

## 2023-08-24 ENCOUNTER — HOSPITAL ENCOUNTER (INPATIENT)
Facility: MEDICAL CENTER | Age: 78
LOS: 1 days | DRG: 274 | End: 2023-08-25
Attending: INTERNAL MEDICINE | Admitting: INTERNAL MEDICINE
Payer: MEDICARE

## 2023-08-24 DIAGNOSIS — Z98.890 S/P ABLATION OF ATRIAL FIBRILLATION: ICD-10-CM

## 2023-08-24 DIAGNOSIS — Z86.79 S/P ABLATION OF ATRIAL FIBRILLATION: ICD-10-CM

## 2023-08-24 DIAGNOSIS — I48.0 PAROXYSMAL ATRIAL FIBRILLATION (HCC): ICD-10-CM

## 2023-08-24 LAB
ACT BLD: 251 SEC (ref 74–137)
ACT BLD: 275 SEC (ref 74–137)
ACT BLD: 287 SEC (ref 74–137)
ACT BLD: 293 SEC (ref 74–137)
ACT BLD: 305 SEC (ref 74–137)
ACT BLD: 329 SEC (ref 74–137)
ALBUMIN SERPL BCP-MCNC: 4.1 G/DL (ref 3.2–4.9)
ALBUMIN/GLOB SERPL: 1.8 G/DL
ALP SERPL-CCNC: 66 U/L (ref 30–99)
ALT SERPL-CCNC: 28 U/L (ref 2–50)
ANION GAP SERPL CALC-SCNC: 13 MMOL/L (ref 7–16)
AST SERPL-CCNC: 28 U/L (ref 12–45)
BASOPHILS # BLD AUTO: 0.6 % (ref 0–1.8)
BASOPHILS # BLD: 0.04 K/UL (ref 0–0.12)
BILIRUB SERPL-MCNC: 1.1 MG/DL (ref 0.1–1.5)
BUN SERPL-MCNC: 33 MG/DL (ref 8–22)
CALCIUM ALBUM COR SERPL-MCNC: 8.7 MG/DL (ref 8.5–10.5)
CALCIUM SERPL-MCNC: 8.8 MG/DL (ref 8.5–10.5)
CHLORIDE SERPL-SCNC: 103 MMOL/L (ref 96–112)
CO2 SERPL-SCNC: 23 MMOL/L (ref 20–33)
CREAT SERPL-MCNC: 1.6 MG/DL (ref 0.5–1.4)
EKG IMPRESSION: NORMAL
EOSINOPHIL # BLD AUTO: 0.32 K/UL (ref 0–0.51)
EOSINOPHIL NFR BLD: 5 % (ref 0–6.9)
ERYTHROCYTE [DISTWIDTH] IN BLOOD BY AUTOMATED COUNT: 44.3 FL (ref 35.9–50)
ERYTHROCYTE [DISTWIDTH] IN BLOOD BY AUTOMATED COUNT: 44.3 FL (ref 35.9–50)
GFR SERPLBLD CREATININE-BSD FMLA CKD-EPI: 44 ML/MIN/1.73 M 2
GLOBULIN SER CALC-MCNC: 2.3 G/DL (ref 1.9–3.5)
GLUCOSE SERPL-MCNC: 105 MG/DL (ref 65–99)
HCT VFR BLD AUTO: 41.4 % (ref 42–52)
HCT VFR BLD AUTO: 41.4 % (ref 42–52)
HGB BLD-MCNC: 13.8 G/DL (ref 14–18)
HGB BLD-MCNC: 13.8 G/DL (ref 14–18)
IMM GRANULOCYTES # BLD AUTO: 0.04 K/UL (ref 0–0.11)
IMM GRANULOCYTES NFR BLD AUTO: 0.6 % (ref 0–0.9)
INR PPP: 1.48 (ref 0.87–1.13)
LYMPHOCYTES # BLD AUTO: 1.59 K/UL (ref 1–4.8)
LYMPHOCYTES NFR BLD: 24.9 % (ref 22–41)
MAGNESIUM SERPL-MCNC: 2 MG/DL (ref 1.5–2.5)
MAGNESIUM SERPL-MCNC: 2.2 MG/DL (ref 1.5–2.5)
MCH RBC QN AUTO: 30.9 PG (ref 27–33)
MCH RBC QN AUTO: 30.9 PG (ref 27–33)
MCHC RBC AUTO-ENTMCNC: 33.3 G/DL (ref 32.3–36.5)
MCHC RBC AUTO-ENTMCNC: 33.3 G/DL (ref 32.3–36.5)
MCV RBC AUTO: 92.8 FL (ref 81.4–97.8)
MCV RBC AUTO: 92.8 FL (ref 81.4–97.8)
MONOCYTES # BLD AUTO: 0.46 K/UL (ref 0–0.85)
MONOCYTES NFR BLD AUTO: 7.2 % (ref 0–13.4)
NEUTROPHILS # BLD AUTO: 3.93 K/UL (ref 1.82–7.42)
NEUTROPHILS NFR BLD: 61.7 % (ref 44–72)
NRBC # BLD AUTO: 0 K/UL
NRBC BLD-RTO: 0 /100 WBC (ref 0–0.2)
PLATELET # BLD AUTO: 268 K/UL (ref 164–446)
PLATELET # BLD AUTO: 268 K/UL (ref 164–446)
PMV BLD AUTO: 9.8 FL (ref 9–12.9)
PMV BLD AUTO: 9.8 FL (ref 9–12.9)
POTASSIUM SERPL-SCNC: 4.2 MMOL/L (ref 3.6–5.5)
PROT SERPL-MCNC: 6.4 G/DL (ref 6–8.2)
PROTHROMBIN TIME: 18.1 SEC (ref 12–14.6)
RBC # BLD AUTO: 4.46 M/UL (ref 4.7–6.1)
RBC # BLD AUTO: 4.46 M/UL (ref 4.7–6.1)
SODIUM SERPL-SCNC: 139 MMOL/L (ref 135–145)
TSH SERPL DL<=0.005 MIU/L-ACNC: 3.5 UIU/ML (ref 0.38–5.33)
WBC # BLD AUTO: 6.5 K/UL (ref 4.8–10.8)
WBC # BLD AUTO: 6.5 K/UL (ref 4.8–10.8)

## 2023-08-24 PROCEDURE — A9270 NON-COVERED ITEM OR SERVICE: HCPCS | Performed by: INTERNAL MEDICINE

## 2023-08-24 PROCEDURE — B246ZZZ ULTRASONOGRAPHY OF RIGHT AND LEFT HEART: ICD-10-PCS | Performed by: INTERNAL MEDICINE

## 2023-08-24 PROCEDURE — 85610 PROTHROMBIN TIME: CPT

## 2023-08-24 PROCEDURE — 700111 HCHG RX REV CODE 636 W/ 250 OVERRIDE (IP)

## 2023-08-24 PROCEDURE — 700111 HCHG RX REV CODE 636 W/ 250 OVERRIDE (IP): Performed by: STUDENT IN AN ORGANIZED HEALTH CARE EDUCATION/TRAINING PROGRAM

## 2023-08-24 PROCEDURE — 84443 ASSAY THYROID STIM HORMONE: CPT

## 2023-08-24 PROCEDURE — 700105 HCHG RX REV CODE 258: Mod: JZ | Performed by: INTERNAL MEDICINE

## 2023-08-24 PROCEDURE — 02K83ZZ MAP CONDUCTION MECHANISM, PERCUTANEOUS APPROACH: ICD-10-PCS | Performed by: INTERNAL MEDICINE

## 2023-08-24 PROCEDURE — 93005 ELECTROCARDIOGRAM TRACING: CPT | Performed by: INTERNAL MEDICINE

## 2023-08-24 PROCEDURE — 160035 HCHG PACU - 1ST 60 MINS PHASE I

## 2023-08-24 PROCEDURE — 770020 HCHG ROOM/CARE - TELE (206)

## 2023-08-24 PROCEDURE — 700105 HCHG RX REV CODE 258: Performed by: STUDENT IN AN ORGANIZED HEALTH CARE EDUCATION/TRAINING PROGRAM

## 2023-08-24 PROCEDURE — 85025 COMPLETE CBC W/AUTO DIFF WBC: CPT

## 2023-08-24 PROCEDURE — 83735 ASSAY OF MAGNESIUM: CPT

## 2023-08-24 PROCEDURE — 700105 HCHG RX REV CODE 258

## 2023-08-24 PROCEDURE — C1730 CATH, EP, 19 OR FEW ELECT: HCPCS

## 2023-08-24 PROCEDURE — 700101 HCHG RX REV CODE 250

## 2023-08-24 PROCEDURE — 93010 ELECTROCARDIOGRAM REPORT: CPT | Mod: 76 | Performed by: INTERNAL MEDICINE

## 2023-08-24 PROCEDURE — 80053 COMPREHEN METABOLIC PANEL: CPT

## 2023-08-24 PROCEDURE — 36415 COLL VENOUS BLD VENIPUNCTURE: CPT

## 2023-08-24 PROCEDURE — 700101 HCHG RX REV CODE 250: Performed by: STUDENT IN AN ORGANIZED HEALTH CARE EDUCATION/TRAINING PROGRAM

## 2023-08-24 PROCEDURE — 700101 HCHG RX REV CODE 250: Performed by: INTERNAL MEDICINE

## 2023-08-24 PROCEDURE — 02583ZZ DESTRUCTION OF CONDUCTION MECHANISM, PERCUTANEOUS APPROACH: ICD-10-PCS | Performed by: INTERNAL MEDICINE

## 2023-08-24 PROCEDURE — 85347 COAGULATION TIME ACTIVATED: CPT | Mod: 91

## 2023-08-24 PROCEDURE — 700102 HCHG RX REV CODE 250 W/ 637 OVERRIDE(OP): Performed by: INTERNAL MEDICINE

## 2023-08-24 PROCEDURE — 93325 DOPPLER ECHO COLOR FLOW MAPG: CPT

## 2023-08-24 PROCEDURE — 5A2204Z RESTORATION OF CARDIAC RHYTHM, SINGLE: ICD-10-PCS | Performed by: INTERNAL MEDICINE

## 2023-08-24 PROCEDURE — 160002 HCHG RECOVERY MINUTES (STAT)

## 2023-08-24 PROCEDURE — 160036 HCHG PACU - EA ADDL 30 MINS PHASE I

## 2023-08-24 RX ORDER — HALOPERIDOL 5 MG/ML
1 INJECTION INTRAMUSCULAR
Status: DISCONTINUED | OUTPATIENT
Start: 2023-08-24 | End: 2023-08-24 | Stop reason: HOSPADM

## 2023-08-24 RX ORDER — LIDOCAINE HYDROCHLORIDE 40 MG/ML
SOLUTION TOPICAL PRN
Status: DISCONTINUED | OUTPATIENT
Start: 2023-08-24 | End: 2023-08-24 | Stop reason: SURG

## 2023-08-24 RX ORDER — OMEPRAZOLE 20 MG/1
20 CAPSULE, DELAYED RELEASE ORAL
Status: DISCONTINUED | OUTPATIENT
Start: 2023-08-24 | End: 2023-08-25 | Stop reason: HOSPADM

## 2023-08-24 RX ORDER — AMOXICILLIN 250 MG
2 CAPSULE ORAL 2 TIMES DAILY
Status: DISCONTINUED | OUTPATIENT
Start: 2023-08-24 | End: 2023-08-25 | Stop reason: HOSPADM

## 2023-08-24 RX ORDER — LIDOCAINE HYDROCHLORIDE 20 MG/ML
INJECTION, SOLUTION INFILTRATION; PERINEURAL
Status: COMPLETED
Start: 2023-08-24 | End: 2023-08-24

## 2023-08-24 RX ORDER — PROTAMINE SULFATE 10 MG/ML
INJECTION, SOLUTION INTRAVENOUS PRN
Status: DISCONTINUED | OUTPATIENT
Start: 2023-08-24 | End: 2023-08-24 | Stop reason: SURG

## 2023-08-24 RX ORDER — POTASSIUM CHLORIDE 20 MEQ/1
20 TABLET, EXTENDED RELEASE ORAL 2 TIMES DAILY
Status: DISCONTINUED | OUTPATIENT
Start: 2023-08-24 | End: 2023-08-25 | Stop reason: HOSPADM

## 2023-08-24 RX ORDER — DIPHENHYDRAMINE HYDROCHLORIDE 50 MG/ML
12.5 INJECTION INTRAMUSCULAR; INTRAVENOUS
Status: DISCONTINUED | OUTPATIENT
Start: 2023-08-24 | End: 2023-08-24 | Stop reason: HOSPADM

## 2023-08-24 RX ORDER — OMEPRAZOLE 20 MG/1
20 CAPSULE, DELAYED RELEASE ORAL DAILY
Qty: 30 CAPSULE | Refills: 0 | Status: SHIPPED | OUTPATIENT
Start: 2023-08-24 | End: 2023-08-25 | Stop reason: SDUPTHER

## 2023-08-24 RX ORDER — SOTALOL HYDROCHLORIDE 80 MG/1
80 TABLET ORAL TWICE DAILY
Status: COMPLETED | OUTPATIENT
Start: 2023-08-24 | End: 2023-08-25

## 2023-08-24 RX ORDER — PHENYLEPHRINE HCL IN 0.9% NACL 0.5 MG/5ML
SYRINGE (ML) INTRAVENOUS
Status: DISPENSED
Start: 2023-08-24 | End: 2023-08-24

## 2023-08-24 RX ORDER — TRAMADOL HYDROCHLORIDE 50 MG/1
50 TABLET ORAL EVERY 6 HOURS PRN
Status: DISCONTINUED | OUTPATIENT
Start: 2023-08-24 | End: 2023-08-25 | Stop reason: HOSPADM

## 2023-08-24 RX ORDER — BISACODYL 10 MG
10 SUPPOSITORY, RECTAL RECTAL
Status: DISCONTINUED | OUTPATIENT
Start: 2023-08-24 | End: 2023-08-25 | Stop reason: HOSPADM

## 2023-08-24 RX ORDER — FERROUS SULFATE 325(65) MG
325 TABLET ORAL DAILY
Status: DISCONTINUED | OUTPATIENT
Start: 2023-08-24 | End: 2023-08-25 | Stop reason: HOSPADM

## 2023-08-24 RX ORDER — OXYCODONE HCL 5 MG/5 ML
5 SOLUTION, ORAL ORAL
Status: DISCONTINUED | OUTPATIENT
Start: 2023-08-24 | End: 2023-08-24 | Stop reason: HOSPADM

## 2023-08-24 RX ORDER — HYDRALAZINE HYDROCHLORIDE 20 MG/ML
5 INJECTION INTRAMUSCULAR; INTRAVENOUS
Status: DISCONTINUED | OUTPATIENT
Start: 2023-08-24 | End: 2023-08-24 | Stop reason: HOSPADM

## 2023-08-24 RX ORDER — MAGNESIUM SULFATE 1 G/100ML
1 INJECTION INTRAVENOUS
Status: ACTIVE | OUTPATIENT
Start: 2023-08-24 | End: 2023-08-25

## 2023-08-24 RX ORDER — SODIUM CHLORIDE, SODIUM LACTATE, POTASSIUM CHLORIDE, CALCIUM CHLORIDE 600; 310; 30; 20 MG/100ML; MG/100ML; MG/100ML; MG/100ML
INJECTION, SOLUTION INTRAVENOUS CONTINUOUS
Status: ACTIVE | OUTPATIENT
Start: 2023-08-24 | End: 2023-08-24

## 2023-08-24 RX ORDER — ALCOHOL 1 ML/ML
10 INJECTION, SOLUTION PERCUTANEOUS ONCE
Status: COMPLETED | OUTPATIENT
Start: 2023-08-24 | End: 2023-08-24

## 2023-08-24 RX ORDER — POLYETHYLENE GLYCOL 3350 17 G/17G
1 POWDER, FOR SOLUTION ORAL
Status: DISCONTINUED | OUTPATIENT
Start: 2023-08-24 | End: 2023-08-25 | Stop reason: HOSPADM

## 2023-08-24 RX ORDER — EPHEDRINE SULFATE 50 MG/ML
5 INJECTION, SOLUTION INTRAVENOUS
Status: DISCONTINUED | OUTPATIENT
Start: 2023-08-24 | End: 2023-08-24 | Stop reason: HOSPADM

## 2023-08-24 RX ORDER — SODIUM CHLORIDE, SODIUM LACTATE, POTASSIUM CHLORIDE, CALCIUM CHLORIDE 600; 310; 30; 20 MG/100ML; MG/100ML; MG/100ML; MG/100ML
INJECTION, SOLUTION INTRAVENOUS CONTINUOUS
Status: DISCONTINUED | OUTPATIENT
Start: 2023-08-24 | End: 2023-08-24 | Stop reason: HOSPADM

## 2023-08-24 RX ORDER — ACETAMINOPHEN 500 MG
1000 TABLET ORAL ONCE
Status: DISCONTINUED | OUTPATIENT
Start: 2023-08-24 | End: 2023-08-24 | Stop reason: HOSPADM

## 2023-08-24 RX ORDER — CEFAZOLIN SODIUM 1 G/3ML
INJECTION, POWDER, FOR SOLUTION INTRAMUSCULAR; INTRAVENOUS PRN
Status: DISCONTINUED | OUTPATIENT
Start: 2023-08-24 | End: 2023-08-24 | Stop reason: SURG

## 2023-08-24 RX ORDER — POTASSIUM CHLORIDE 20 MEQ/1
40 TABLET, EXTENDED RELEASE ORAL
Status: ACTIVE | OUTPATIENT
Start: 2023-08-24 | End: 2023-08-25

## 2023-08-24 RX ORDER — HEPARIN SODIUM 1000 [USP'U]/ML
INJECTION, SOLUTION INTRAVENOUS; SUBCUTANEOUS
Status: COMPLETED
Start: 2023-08-24 | End: 2023-08-24

## 2023-08-24 RX ORDER — ONDANSETRON 2 MG/ML
INJECTION INTRAMUSCULAR; INTRAVENOUS PRN
Status: DISCONTINUED | OUTPATIENT
Start: 2023-08-24 | End: 2023-08-24 | Stop reason: SURG

## 2023-08-24 RX ORDER — ALCOHOL 1 ML/ML
5 INJECTION, SOLUTION PERCUTANEOUS ONCE
Status: DISCONTINUED | OUTPATIENT
Start: 2023-08-24 | End: 2023-08-24

## 2023-08-24 RX ORDER — DEXAMETHASONE SODIUM PHOSPHATE 4 MG/ML
INJECTION, SOLUTION INTRA-ARTICULAR; INTRALESIONAL; INTRAMUSCULAR; INTRAVENOUS; SOFT TISSUE PRN
Status: DISCONTINUED | OUTPATIENT
Start: 2023-08-24 | End: 2023-08-24 | Stop reason: SURG

## 2023-08-24 RX ORDER — BUDESONIDE AND FORMOTEROL FUMARATE DIHYDRATE 160; 4.5 UG/1; UG/1
2 AEROSOL RESPIRATORY (INHALATION) 2 TIMES DAILY
Status: DISCONTINUED | OUTPATIENT
Start: 2023-08-24 | End: 2023-08-24

## 2023-08-24 RX ORDER — PROTAMINE SULFATE 10 MG/ML
INJECTION, SOLUTION INTRAVENOUS
Status: COMPLETED
Start: 2023-08-24 | End: 2023-08-24

## 2023-08-24 RX ORDER — OXYCODONE HCL 5 MG/5 ML
10 SOLUTION, ORAL ORAL
Status: DISCONTINUED | OUTPATIENT
Start: 2023-08-24 | End: 2023-08-24 | Stop reason: HOSPADM

## 2023-08-24 RX ORDER — LEVOTHYROXINE SODIUM 88 UG/1
88 TABLET ORAL
Status: DISCONTINUED | OUTPATIENT
Start: 2023-08-25 | End: 2023-08-25 | Stop reason: HOSPADM

## 2023-08-24 RX ORDER — LOSARTAN POTASSIUM AND HYDROCHLOROTHIAZIDE 12.5; 5 MG/1; MG/1
1 TABLET ORAL DAILY
Status: DISCONTINUED | OUTPATIENT
Start: 2023-08-24 | End: 2023-08-24

## 2023-08-24 RX ORDER — ONDANSETRON 2 MG/ML
4 INJECTION INTRAMUSCULAR; INTRAVENOUS
Status: DISCONTINUED | OUTPATIENT
Start: 2023-08-24 | End: 2023-08-24 | Stop reason: HOSPADM

## 2023-08-24 RX ORDER — ACETAMINOPHEN 325 MG/1
650 TABLET ORAL EVERY 4 HOURS PRN
Status: DISCONTINUED | OUTPATIENT
Start: 2023-08-24 | End: 2023-08-25 | Stop reason: HOSPADM

## 2023-08-24 RX ORDER — TAMSULOSIN HYDROCHLORIDE 0.4 MG/1
0.4 CAPSULE ORAL EVERY EVENING
Status: DISCONTINUED | OUTPATIENT
Start: 2023-08-24 | End: 2023-08-25 | Stop reason: HOSPADM

## 2023-08-24 RX ORDER — PHENYLEPHRINE HYDROCHLORIDE 10 MG/ML
INJECTION, SOLUTION INTRAMUSCULAR; INTRAVENOUS; SUBCUTANEOUS PRN
Status: DISCONTINUED | OUTPATIENT
Start: 2023-08-24 | End: 2023-08-24 | Stop reason: SURG

## 2023-08-24 RX ORDER — ROSUVASTATIN CALCIUM 10 MG/1
10 TABLET, COATED ORAL EVERY EVENING
Status: DISCONTINUED | OUTPATIENT
Start: 2023-08-24 | End: 2023-08-25 | Stop reason: HOSPADM

## 2023-08-24 RX ORDER — LIDOCAINE HYDROCHLORIDE 40 MG/ML
SOLUTION TOPICAL
Status: COMPLETED
Start: 2023-08-24 | End: 2023-08-24

## 2023-08-24 RX ORDER — FUROSEMIDE 20 MG/1
20 TABLET ORAL DAILY
Status: DISCONTINUED | OUTPATIENT
Start: 2023-08-24 | End: 2023-08-25 | Stop reason: HOSPADM

## 2023-08-24 RX ORDER — HEPARIN SODIUM 200 [USP'U]/100ML
INJECTION, SOLUTION INTRAVENOUS
Status: COMPLETED
Start: 2023-08-24 | End: 2023-08-24

## 2023-08-24 RX ADMIN — SOTALOL HYDROCHLORIDE 80 MG: 80 TABLET ORAL at 17:28

## 2023-08-24 RX ADMIN — ROSUVASTATIN 10 MG: 10 TABLET, FILM COATED ORAL at 17:30

## 2023-08-24 RX ADMIN — FUROSEMIDE 20 MG: 20 TABLET ORAL at 17:28

## 2023-08-24 RX ADMIN — EPHEDRINE SULFATE 10 MG: 50 INJECTION, SOLUTION INTRAVENOUS at 08:49

## 2023-08-24 RX ADMIN — POTASSIUM CHLORIDE 20 MEQ: 20 TABLET, EXTENDED RELEASE ORAL at 18:00

## 2023-08-24 RX ADMIN — ALCOHOL 10 ML: 1 INJECTION, SOLUTION PERCUTANEOUS at 11:11

## 2023-08-24 RX ADMIN — EPHEDRINE SULFATE 10 MG: 50 INJECTION, SOLUTION INTRAVENOUS at 09:05

## 2023-08-24 RX ADMIN — PHENYLEPHRINE HYDROCHLORIDE 150 MCG: 10 INJECTION INTRAVENOUS at 10:22

## 2023-08-24 RX ADMIN — DEXAMETHASONE SODIUM PHOSPHATE 4 MG: 4 INJECTION INTRA-ARTICULAR; INTRALESIONAL; INTRAMUSCULAR; INTRAVENOUS; SOFT TISSUE at 08:44

## 2023-08-24 RX ADMIN — CEFAZOLIN 2 G: 1 INJECTION, POWDER, FOR SOLUTION INTRAMUSCULAR; INTRAVENOUS at 08:29

## 2023-08-24 RX ADMIN — ONDANSETRON 4 MG: 2 INJECTION INTRAMUSCULAR; INTRAVENOUS at 12:04

## 2023-08-24 RX ADMIN — FENTANYL CITRATE 100 MCG: 50 INJECTION, SOLUTION INTRAMUSCULAR; INTRAVENOUS at 08:29

## 2023-08-24 RX ADMIN — ROCURONIUM BROMIDE 5 MG: 10 INJECTION, SOLUTION INTRAVENOUS at 11:12

## 2023-08-24 RX ADMIN — APIXABAN 5 MG: 5 TABLET, FILM COATED ORAL at 17:29

## 2023-08-24 RX ADMIN — PHENYLEPHRINE HYDROCHLORIDE 50 MCG: 10 INJECTION INTRAVENOUS at 10:16

## 2023-08-24 RX ADMIN — PROTAMINE SULFATE 50 MG: 10 INJECTION, SOLUTION INTRAVENOUS at 12:04

## 2023-08-24 RX ADMIN — SUGAMMADEX 200 MG: 100 INJECTION, SOLUTION INTRAVENOUS at 12:09

## 2023-08-24 RX ADMIN — SODIUM CHLORIDE, POTASSIUM CHLORIDE, SODIUM LACTATE AND CALCIUM CHLORIDE: 600; 310; 30; 20 INJECTION, SOLUTION INTRAVENOUS at 07:21

## 2023-08-24 RX ADMIN — ROCURONIUM BROMIDE 5 MG: 10 INJECTION, SOLUTION INTRAVENOUS at 09:59

## 2023-08-24 RX ADMIN — ROCURONIUM BROMIDE 5 MG: 10 INJECTION, SOLUTION INTRAVENOUS at 10:53

## 2023-08-24 RX ADMIN — PROPOFOL 120 MG: 10 INJECTION, EMULSION INTRAVENOUS at 08:29

## 2023-08-24 RX ADMIN — HEPARIN SODIUM: 1000 INJECTION, SOLUTION INTRAVENOUS; SUBCUTANEOUS at 11:13

## 2023-08-24 RX ADMIN — ROCURONIUM BROMIDE 5 MG: 10 INJECTION, SOLUTION INTRAVENOUS at 09:32

## 2023-08-24 RX ADMIN — PHENYLEPHRINE HYDROCHLORIDE 100 MCG: 10 INJECTION INTRAVENOUS at 11:02

## 2023-08-24 RX ADMIN — HEPARIN SODIUM: 1000 INJECTION, SOLUTION INTRAVENOUS; SUBCUTANEOUS at 11:12

## 2023-08-24 RX ADMIN — LIDOCAINE HYDROCHLORIDE: 20 INJECTION, SOLUTION INFILTRATION; PERINEURAL at 08:17

## 2023-08-24 RX ADMIN — PHENYLEPHRINE HYDROCHLORIDE 50 MCG: 10 INJECTION INTRAVENOUS at 11:21

## 2023-08-24 RX ADMIN — HEPARIN SODIUM 2000 UNITS: 200 INJECTION, SOLUTION INTRAVENOUS at 11:45

## 2023-08-24 RX ADMIN — PROPOFOL 20 MG: 10 INJECTION, EMULSION INTRAVENOUS at 08:30

## 2023-08-24 RX ADMIN — HEPARIN SODIUM 6000 UNITS: 200 INJECTION, SOLUTION INTRAVENOUS at 08:42

## 2023-08-24 RX ADMIN — ROCURONIUM BROMIDE 50 MG: 10 INJECTION, SOLUTION INTRAVENOUS at 08:30

## 2023-08-24 RX ADMIN — EPHEDRINE SULFATE 20 MG: 50 INJECTION, SOLUTION INTRAVENOUS at 08:39

## 2023-08-24 RX ADMIN — ROCURONIUM BROMIDE 10 MG: 10 INJECTION, SOLUTION INTRAVENOUS at 09:08

## 2023-08-24 RX ADMIN — TAMSULOSIN HYDROCHLORIDE 0.4 MG: 0.4 CAPSULE ORAL at 17:28

## 2023-08-24 RX ADMIN — OMEPRAZOLE 20 MG: 20 CAPSULE, DELAYED RELEASE ORAL at 17:37

## 2023-08-24 RX ADMIN — PHENYLEPHRINE HYDROCHLORIDE 200 MCG: 10 INJECTION INTRAVENOUS at 11:12

## 2023-08-24 RX ADMIN — LIDOCAINE HYDROCHLORIDE 4 ML: 40 SOLUTION TOPICAL at 08:30

## 2023-08-24 RX ADMIN — ROCURONIUM BROMIDE 10 MG: 10 INJECTION, SOLUTION INTRAVENOUS at 10:25

## 2023-08-24 RX ADMIN — FERROUS SULFATE TAB 325 MG (65 MG ELEMENTAL FE) 325 MG: 325 (65 FE) TAB at 17:29

## 2023-08-24 RX ADMIN — EPHEDRINE SULFATE 20 MG: 50 INJECTION, SOLUTION INTRAVENOUS at 08:54

## 2023-08-24 RX ADMIN — SODIUM CHLORIDE 40 MCG/MIN: 900 INJECTION INTRAVENOUS at 11:26

## 2023-08-24 ASSESSMENT — CHA2DS2 SCORE
CHA2DS2 VASC SCORE: 3
DIABETES: NO
SEX: MALE
AGE 75 OR GREATER: YES
PRIOR STROKE OR TIA OR THROMBOEMBOLISM: NO
HYPERTENSION: YES
AGE 65 TO 74: NO
VASCULAR DISEASE: NO

## 2023-08-24 ASSESSMENT — FIBROSIS 4 INDEX: FIB4 SCORE: 1.17

## 2023-08-24 ASSESSMENT — PAIN SCALES - GENERAL: PAIN_LEVEL: 2

## 2023-08-24 NOTE — ANESTHESIA POSTPROCEDURE EVALUATION
Patient: Giovany Kruger    Procedure Summary     Date: 08/24/23 Room / Location: [facility] - Cath Lab [facility]    Anesthesia Start: 0825 Anesthesia Stop: 1223    Procedure: CL-EP ABLATION ATRIAL FIBRILLATION Diagnosis:       Paroxysmal atrial fibrillation (HCC)      Paroxysmal atrial fibrillation      (See Associated Dx)    Scheduled Providers: Mathew Santa M.D.; Kimberly Cobb M.D. Responsible Provider: Kimberly Cobb M.D.    Anesthesia Type: general ASA Status: 3          Final Anesthesia Type: general  Last vitals  BP   Blood Pressure : 108/68    Temp   (!) 35.6 °C (96.1 °F)    Pulse   70   Resp   18    SpO2   98 %      Anesthesia Post Evaluation    Patient location during evaluation: PACU  Patient participation: complete - patient participated  Level of consciousness: sleepy but conscious  Pain score: 2    Airway patency: patent  Anesthetic complications: no  Cardiovascular status: hemodynamically stable  Respiratory status: acceptable and face mask  Hydration status: euvolemic    PONV: none          No notable events documented.     Nurse Pain Score: 0 (NPRS)

## 2023-08-24 NOTE — ANESTHESIA TIME REPORT
Anesthesia Start and Stop Event Times     Date Time Event    8/24/2023 0802 Ready for Procedure     0825 Anesthesia Start     1223 Anesthesia Stop        Responsible Staff  08/24/23    Name Role Begin End    Kimberly Cobb M.D. Anesth 0825 1223        Overtime Reason:  no overtime (within assigned shift)    Comments:

## 2023-08-24 NOTE — ANESTHESIA PROCEDURE NOTES
BREANA    Date/Time: 8/24/2023 8:41 AM    Performed by: Kimberly Cobb M.D.  Authorized by: Kimberly Cobb M.D.    Start Time:8/24/2023 8:41 AM  Preanesthetic Checklist: patient identified, IV checked, site marked, risks and benefits discussed, surgical consent, monitors and equipment checked, pre-op evaluation and timeout performed    Indication for BREANA: diagnostic   Patient Location: OR  Intubated: Yes  Bite Block: Yes  Heart Visualized: Yes  Insertion: atraumatic    **See FULL BREANA report in patient's chart via CV Synapse**

## 2023-08-24 NOTE — DISCHARGE INSTRUCTIONS
HOME CARE INSTRUCTIONS    ACTIVITY: Rest and take it easy for the first 24 hours.  A responsible adult is recommended to remain with you during that time.  It is normal to feel sleepy.  We encourage you to not do anything that requires balance, judgment or coordination.    FOR 24 HOURS DO NOT:  Drive, operate machinery or run household appliances.  Drink beer or alcoholic beverages.  Make important decisions or sign legal documents.    SPECIAL INSTRUCTIONS: [facility] Post Ablation Patient Instructions:  No lifting > 10 lbs x 1 week.      No soaking in baths, hot tubs, pools x 1 week.  May shower the day after discharge and take off groin dressings and leave  sites uncovered.  Continue to monitor sites daily for warmth, redness, discolored drainage.  It is common to have a small lump in the area where the cather was (usually the size of a marble); this will go away but takes approximately 6 weeks to normalize.     3.   Please take all medications as prescribed to you; please do not stop any medications prescribed post ablation unless directed by your healthcare provider.      4.   Please do not miss any doses of your blood thinner (if you have been started on, or take chronic blood thinners) without discussion with your healthcare provider first.     5.   Please walk and take deep breaths after discharge.  After discharge, if you experience neurological changes/signs of stroke or high fever you should be seen in the emergency dept.     6.   It is possible you may experience some chest discomfort or chest tightness post ablation.  This is usually secondary to inflammation and irritation of the tissues at the area of the ablation.  If this occurs, it is advised to try 400 mg of Ibuprofen with food as needed up to three times a day for a maximum of two days.  This should help to decrease pain and tissue inflammation.          **Please notify the office ([phone]) if this occurs.          ** DO NOT TAKE Ibuprofen IF HISTORY OF ALLERGY, SIGNIFICANT BLEEDING OR KIDNEY DISEASE WITHOUT DISCUSSING WITH YOUR CARDIOLOGY PROVIDER FIRST.          ** If pain becomes severe or you have additional symptoms you may need to be medically evaluated; please contact the cardiology office ([phone]) for further direction.     7. It is possible that you may experience arrhythmia/Atrial Fibrillation post ablation.  This is secondary to irritation and inflammation of the cardiac tissues from the ablation.  If you have atrial fibrillation all day or feel poorly with it, please notify your cardiologist's via phone ([phone]) or [facility].      8.  Please contact call our office ([phone]) or message via pSivida message if you have any questions or concerns post procedurally.    9. You need to be seen for post ablation follow up 3-4 weeks post procedure. An appointment is scheduled for you.  Please contact the office ([phone]) if you need to change your appointment.      DIET: To avoid nausea, slowly advance diet as tolerated, avoiding spicy or greasy foods for the first day.  Add more substantial food to your diet according to your physician's instructions.  Babies can be fed formula or breast milk as soon as they are hungry.  INCREASE FLUIDS AND FIBER TO AVOID CONSTIPATION.    MEDICATIONS: Resume taking daily medication.  Take prescribed pain medication with food.  If no medication is prescribed, you may take non-aspirin pain medication if needed.  PAIN MEDICATION CAN BE VERY CONSTIPATING.  Take a stool softener or laxative such as senokot, pericolace, or milk of magnesia if needed.    Prescription given for __.  Last pain medication given at __.    A follow-up appointment should be arranged with your doctor; call to schedule.    You should CALL YOUR PHYSICIAN if you develop:  Fever greater than 101 degrees F.  Pain not relieved by medication, or persistent nausea or vomiting.  Excessive bleeding  (blood soaking through dressing) or unexpected drainage from the wound.  Extreme redness or swelling around the incision site, drainage of pus or foul smelling drainage.  Inability to urinate or empty your bladder within 8 hours.  Problems with breathing or chest pain.    You should call 911 if you develop problems with breathing or chest pain.  If you are unable to contact your doctor or surgical center, you should go to the nearest emergency room or urgent care center.  Physician's telephone #: [phone]    MILD FLU-LIKE SYMPTOMS ARE NORMAL.  YOU MAY EXPERIENCE GENERALIZED MUSCLE ACHES, THROAT IRRITATION, HEADACHE AND/OR SOME NAUSEA.    If any questions arise, call your doctor.  If your doctor is not available, please feel free to call the Surgical Center at [phone].  The Center is open Monday through Friday from 7AM to 7PM.      A registered nurse may call you a few days after your surgery to see how you are doing after your procedure.    You may also receive a survey in the mail within the next two weeks and we ask that you take a few moments to complete the survey and return it to us.  Our goal is to provide you with very good care and we value your comments.     Depression / Suicide Risk    As you are discharged from this [facility] facility, it is important to learn how to keep safe from harming yourself.    Recognize the warning signs:  Abrupt changes in personality, positive or negative- including increase in energy   Giving away possessions  Change in eating patterns- significant weight changes-  positive or negative  Change in sleeping patterns- unable to sleep or sleeping all the time   Unwillingness or inability to communicate  Depression  Unusual sadness, discouragement and loneliness  Talk of wanting to die  Neglect of personal appearance   Rebelliousness- reckless behavior  Withdrawal from people/activities they love  Confusion- inability to concentrate     If you or a loved one observes  any of these behaviors or has concerns about self-harm, here's what you can do:  Talk about it- your feelings and reasons for harming yourself  Remove any means that you might use to hurt yourself (examples: pills, rope, extension cords, firearm)  Get professional help from the community (Mental Health, Substance Abuse, psychological counseling)  Do not be alone:Call your Safe Contact- someone whom you trust who will be there for you.  Call your local CRISIS HOTLINE 221-6673 or 154-383-5059  Call your local Children's Mobile Crisis Response Team [place] (325) 769-9672 or www.flatev  Call the toll free National Suicide Prevention Hotlines   National Suicide Prevention Lifeline 046-011-NEXF (2329)  National Hope Line Network 800-SUICIDE (935-8409)    I acknowledge receipt and understanding of these Home Care instructions.

## 2023-08-24 NOTE — PROGRESS NOTES
Med Rec complete per patient   Allergies reviewed  No oral antibiotics in the last 30 days  Preferred pharmacy: [facility] in [place]

## 2023-08-24 NOTE — CATH LAB
Electrophysiology Procedure Note  [facility]    Indication: Persistent atrial fibrillation    Procedure Performed: 1. Atrial fibrillation ablation 2. Advanced mapping using CARTO system  3. Transesophageal echocardiography 4. Intracardiac echocardiography 5. Esophageal temperature monitoring 6. Intraarterial blood pressure monitoring 7. Frequent ACT's 8. Additional ablation secondary arrhythmia x2 9.  Ablation of secondary cause of atrial fibrillation x2 9.  Pre and post reprogramming of dual-chamber pacemaker    Physician(s): SUSANNA Santa M.D.     Resident/Assistant(s): None     Anesthesia: General endotracheal anesthetic.    Anaesthesiologist: Dr Kimberly Cobb    Specimen(s) Removed: None     Estimated Blood Loss:  30cc     Complications:  None    Pre-procedure ECG: Atrial fibrillation with RV pacing    Post Procedure ECG: Sinus rhythm    Description of Procedure:   After informed written consent, the patient was brought to the EP lab in the fasting, non-sedated state. The patient was prepped and draped in the usual sterile fashion.  BREANA showed no evidence of LA clot. Arterial line placed for monitoring. Esophageal temperature probe placed and monitored.  Femoral venous access was obtained using the modified Seldinger technique. In the right femoral vein, 3 sheaths (8,8, 6 Fr) were inserted over 0.35” guidewires.  A deflectable decapolar catheter was advanced to the CS position.  An intracardiac echo (ICE) catheter was advanced to the right atrium. ICE was used to identify the atrial septum, left atrial appendage, and pulmonary veins and twin the anatomic structures to superimpose on our 3D electroanatomic map using CARTOSound. During the procedure, ICE was utilized to localize the ablation catheter, monitor for thrombus formation, and to exclude pericardial effusion. Intravenous heparin was administered to maintain -350 seconds.  Single transseptal left heart catheterization was performed under  "intracardiac echo and hemodynamic guidance. A [place] needle was inserted into the  8 Fr sheath (TorFlex)for transseptal puncture and placement of sheaths in the left atrium. Sheath was switched to a Vizigo sheath. Advanced mapping of the pulmonary veins and left atrium was performed using CARTO3 FAM  and a deflectable multipolar mapping catheter (Biosense OctaRay) using the CARTO system. Wide antral circumferential ablation was performed using an 3.5 mm deflectable irrigated force contact catheter (Biosense ST/SF D/F) at primarily 25 W (posteriorly) to 35 to 40 W ( anteriorly)  power starting from the L sided veins and then proceeding along to the RSPV and then finally the RIPV. Bidirectional pulmonary vein antrum isolation was verified with repeat mapping with OctaRay.  Next posterior wall isolation was performed.  Patient appeared to go into typical RA flutter. Next sheath were brought back into the RA. Mapping with OctaRay confirmed RA flutter. The RA isthmus was ablated with block at 35 W. During RFA patient went into atrial fibrillation.  The medium-curve Vizigo was brought  into the right atrium. The Ablation catheter was inserted through this sheath and the CS was cannulated, following this the Vizigo was advanced  into the CS os. A LIMA Cath was inserted into the Vizigo. Used a Glide Wire 0.32\" the CS was cannulated with this LIMA catheter and advanced into posterolateral CS. A contrast injection through the LIMA Cath revealed a large and patent Vein of Malcolm. Using a 0.014\" Whisper Wire, the Vein of Malcolm was wired, and a 2.0 mm balloon (Medtronic sprinter balloon  OTW 6 mm) was advanced into the os of the vein.  This balloon was inflated to nominal setting 8 to 10 ÁNGELA and contrast injection demonstrated occlusion of the vein. At this time 5cc of ethanol 99% were injected through the balloon tip into the vein over 60 seconds times two. After a 60 second waiting period contrast was once more " injected demonstrating distal staining. The balloon and LIMA cath were removed at this time. Trans-septal access was re-attained using the ablation catheter and Tor-flex sheath.   The patient went into atrial flutter mitral.  Ablation along the mitral line at 40 W with conversion into  atrial fibrillation.  Cardioversion performed.  Pacing from left atrial appendage never confirmed complete mitral block despite extensive endocardial ablation and alcohol ablation.  At the end of the procedure, heparin was reversed with protamine, the catheter and sheaths were removed, and hemostasis was achieved with vascades. Following recovery from anesthesia, the patient was transferred to recovery.      Total ablation time: 2962 seconds    Fluoroscopy time: 461 mGy     Electrophysiologic Findings:    1.  Atrial fibrillation with RV pacing.    Impressions:    1.  Persistent atrial fibrillation.    2.  Pulmonary vein isolation procedure.  3.  Right atrial isthmus ablation with block.    4.  Posterior wall isolation.  5.  Ethanol ablation of the vein of Malcolm.  6.  Endocardial mitral line.      Recommendations:  1. Resume anticoagulation.  2. Monitored bedrest.

## 2023-08-24 NOTE — CARE PLAN
The patient is Stable - Low risk of patient condition declining or worsening    Shift Goals  Clinical Goals: VSS, diuresis, hemodynamic stability  Patient Goals: rest, comfort, go home  Family Goals: comfort, go home    Progress made toward(s) clinical / shift goals:      Problem: Knowledge Deficit - Standard  Goal: Patient and family/care givers will demonstrate understanding of plan of care, disease process/condition, diagnostic tests and medications  Outcome: Progressing     Problem: Pain - Standard  Goal: Alleviation of pain or a reduction in pain to the patient’s comfort goal  Outcome: Progressing     Problem: Fall Risk  Goal: Patient will remain free from falls  Outcome: Progressing       Patient is not progressing towards the following goals:

## 2023-08-24 NOTE — OR NURSING
1218: Patient arrived from cath lab, handoff  completed. Patient placed on monitors with audible alarms. SCD's applied. Tolerating PO intake well.     1235: Updated patient's wife Jannet via phone.    1346: Notified YOAV CANCHOLA of EKG changes, repeat EKG ordered and completed.     1400: YOAV CANCHOLA bedside. Magnesium lab add-on ordered and completed.     1425: Updated patient's wife Jannet.    1529: Report to Ari DOUGLAS, opportunity for questions provided.    1549: One blue bag and one clear patient belongings bag on [facility]. Per patient's wife both hearing aids and glasses are in zippered blue bag pockets. Patient transported to T7 on a monitor with patient's wife. Bedside updates provided.

## 2023-08-24 NOTE — ANESTHESIA PREPROCEDURE EVALUATION
Date/Time: 08/24/23 0900    Scheduled providers: Mathew Santa M.D.; Kimberly Cobb M.D.    Procedure: CL-EP ABLATION ATRIAL FIBRILLATION    Diagnosis:       Paroxysmal atrial fibrillation (HCC) [I48.0]      Paroxysmal atrial fibrillation [I48.0]    Indications: See Associated Dx    Location: [facility] - Cath Lab - [facility]          Relevant Problems   PULMONARY   (positive) Mild persistent asthma without complication   (positive) SOB (shortness of breath)      CARDIAC   (positive) Aortic ectasia, thoracic (HCC)   (positive) Atrial fibrillation, amio 2011   (positive) Cardiac pacemaker in situ   (positive) Essential hypertension      ENDO   (positive) Hypothyroid       Physical Exam    Airway   Mallampati: II  TM distance: >3 FB  Neck ROM: full       Cardiovascular - normal exam  Rhythm: regular  Rate: normal  (-) murmur     Dental - normal exam           Pulmonary - normal exam  Breath sounds clear to auscultation     Abdominal    Neurological - normal exam         Other findings: No loose teeth           Anesthesia Plan    ASA 3   ASA physical status 3 criteria: implanted pacemaker    Plan - general       Airway plan will be ETT  BREANA Planned        Induction: intravenous    Postoperative Plan: Postoperative administration of opioids is intended.    Pertinent diagnostic labs and testing reviewed    Informed Consent:    Anesthetic plan and risks discussed with patient.    Use of blood products discussed with: patient whom consented to blood products.

## 2023-08-24 NOTE — ANESTHESIA PROCEDURE NOTES
Arterial Line    Performed by: Kimberly Cobb M.D.  Authorized by: Kimberly Cobb M.D.    Start Time:  8/24/2023 8:37 AM  End Time:  8/24/2023 8:40 AM  Localization: ultrasound guidance  Image captured, interpreted and electronically stored.  Patient Location:  OR  Indication: continuous blood pressure monitoring        Catheter Size:  20 G  Seldinger Technique?: Yes    Laterality:  Right  Site:  Radial artery  Line Secured:  Antimicrobial disc, tape and transparent dressing  Events: patient tolerated procedure well with no complications

## 2023-08-24 NOTE — ANESTHESIA PROCEDURE NOTES
Airway    Date/Time: 8/24/2023 8:30 AM    Performed by: Kimberly Cobb M.D.  Authorized by: Kimberly Cobb M.D.    Location:  OR  Urgency:  Elective  Indications for Airway Management:  Anesthesia      Spontaneous Ventilation: absent    Sedation Level:  Deep  Preoxygenated: Yes    Patient Position:  Sniffing  Mask Difficulty Assessment:  1 - vent by mask  Final Airway Type:  Endotracheal airway  Final Endotracheal Airway:  ETT  Cuffed: Yes    Technique Used for Successful ETT Placement:  Direct laryngoscopy    Insertion Site:  Oral  Blade Type:  Meka  Laryngoscope Blade/Videolaryngoscope Blade Size:  4  ETT Size (mm):  8.0  Measured from:  Teeth  ETT to Teeth (cm):  24  Placement Verified by: auscultation and capnometry    Cormack-Lehane Classification:  Grade IIb - view of arytenoids or posterior of glottis only  Number of Attempts at Approach:  1

## 2023-08-25 ENCOUNTER — PHARMACY VISIT (OUTPATIENT)
Dept: PHARMACY | Facility: MEDICAL CENTER | Age: 78
End: 2023-08-25
Payer: MEDICARE

## 2023-08-25 VITALS
BODY MASS INDEX: 23.99 KG/M2 | RESPIRATION RATE: 18 BRPM | SYSTOLIC BLOOD PRESSURE: 102 MMHG | TEMPERATURE: 98.1 F | HEIGHT: 74 IN | HEART RATE: 72 BPM | DIASTOLIC BLOOD PRESSURE: 58 MMHG | WEIGHT: 186.95 LBS | OXYGEN SATURATION: 95 %

## 2023-08-25 LAB
ALBUMIN SERPL BCP-MCNC: 3.6 G/DL (ref 3.2–4.9)
ALBUMIN/GLOB SERPL: 1.7 G/DL
ALP SERPL-CCNC: 54 U/L (ref 30–99)
ALT SERPL-CCNC: 22 U/L (ref 2–50)
ANION GAP SERPL CALC-SCNC: 11 MMOL/L (ref 7–16)
AST SERPL-CCNC: 37 U/L (ref 12–45)
BILIRUB SERPL-MCNC: 0.9 MG/DL (ref 0.1–1.5)
BUN SERPL-MCNC: 31 MG/DL (ref 8–22)
CALCIUM ALBUM COR SERPL-MCNC: 8.8 MG/DL (ref 8.5–10.5)
CALCIUM SERPL-MCNC: 8.5 MG/DL (ref 8.5–10.5)
CHLORIDE SERPL-SCNC: 104 MMOL/L (ref 96–112)
CO2 SERPL-SCNC: 21 MMOL/L (ref 20–33)
CREAT SERPL-MCNC: 1.21 MG/DL (ref 0.5–1.4)
EKG IMPRESSION: NORMAL
ERYTHROCYTE [DISTWIDTH] IN BLOOD BY AUTOMATED COUNT: 45.2 FL (ref 35.9–50)
GFR SERPLBLD CREATININE-BSD FMLA CKD-EPI: 62 ML/MIN/1.73 M 2
GLOBULIN SER CALC-MCNC: 2.1 G/DL (ref 1.9–3.5)
GLUCOSE SERPL-MCNC: 131 MG/DL (ref 65–99)
HCT VFR BLD AUTO: 35.6 % (ref 42–52)
HGB BLD-MCNC: 11.9 G/DL (ref 14–18)
MAGNESIUM SERPL-MCNC: 2 MG/DL (ref 1.5–2.5)
MCH RBC QN AUTO: 31.2 PG (ref 27–33)
MCHC RBC AUTO-ENTMCNC: 33.4 G/DL (ref 32.3–36.5)
MCV RBC AUTO: 93.2 FL (ref 81.4–97.8)
PLATELET # BLD AUTO: 230 K/UL (ref 164–446)
PMV BLD AUTO: 10.1 FL (ref 9–12.9)
POTASSIUM SERPL-SCNC: 3.9 MMOL/L (ref 3.6–5.5)
PROT SERPL-MCNC: 5.7 G/DL (ref 6–8.2)
RBC # BLD AUTO: 3.82 M/UL (ref 4.7–6.1)
SODIUM SERPL-SCNC: 136 MMOL/L (ref 135–145)
WBC # BLD AUTO: 8.8 K/UL (ref 4.8–10.8)

## 2023-08-25 PROCEDURE — 93010 ELECTROCARDIOGRAM REPORT: CPT | Performed by: INTERNAL MEDICINE

## 2023-08-25 PROCEDURE — 93657 TX L/R ATRIAL FIB ADDL: CPT | Performed by: INTERNAL MEDICINE

## 2023-08-25 PROCEDURE — 93656 COMPRE EP EVAL ABLTJ ATR FIB: CPT | Performed by: INTERNAL MEDICINE

## 2023-08-25 PROCEDURE — 93005 ELECTROCARDIOGRAM TRACING: CPT | Performed by: INTERNAL MEDICINE

## 2023-08-25 PROCEDURE — RXMED WILLOW AMBULATORY MEDICATION CHARGE: Performed by: NURSE PRACTITIONER

## 2023-08-25 PROCEDURE — 99238 HOSP IP/OBS DSCHRG MGMT 30/<: CPT | Performed by: NURSE PRACTITIONER

## 2023-08-25 PROCEDURE — 36415 COLL VENOUS BLD VENIPUNCTURE: CPT

## 2023-08-25 PROCEDURE — 80053 COMPREHEN METABOLIC PANEL: CPT

## 2023-08-25 PROCEDURE — 85027 COMPLETE CBC AUTOMATED: CPT

## 2023-08-25 PROCEDURE — A9270 NON-COVERED ITEM OR SERVICE: HCPCS | Performed by: INTERNAL MEDICINE

## 2023-08-25 PROCEDURE — 700102 HCHG RX REV CODE 250 W/ 637 OVERRIDE(OP): Performed by: INTERNAL MEDICINE

## 2023-08-25 PROCEDURE — 83735 ASSAY OF MAGNESIUM: CPT

## 2023-08-25 PROCEDURE — 93655 ICAR CATH ABLTJ DSCRT ARRHYT: CPT | Performed by: INTERNAL MEDICINE

## 2023-08-25 RX ORDER — OMEPRAZOLE 20 MG/1
20 CAPSULE, DELAYED RELEASE ORAL DAILY
Qty: 30 CAPSULE | Refills: 0 | Status: SHIPPED | OUTPATIENT
Start: 2023-08-25 | End: 2023-09-13

## 2023-08-25 RX ORDER — SOTALOL HYDROCHLORIDE 80 MG/1
80 TABLET ORAL DAILY
Qty: 90 TABLET | Refills: 0 | Status: SHIPPED | OUTPATIENT
Start: 2023-08-25 | End: 2023-08-30 | Stop reason: SDUPTHER

## 2023-08-25 RX ADMIN — POTASSIUM CHLORIDE 20 MEQ: 20 TABLET, EXTENDED RELEASE ORAL at 05:55

## 2023-08-25 RX ADMIN — FERROUS SULFATE TAB 325 MG (65 MG ELEMENTAL FE) 325 MG: 325 (65 FE) TAB at 05:55

## 2023-08-25 RX ADMIN — LEVOTHYROXINE SODIUM 88 MCG: 0.09 TABLET ORAL at 05:55

## 2023-08-25 RX ADMIN — APIXABAN 5 MG: 5 TABLET, FILM COATED ORAL at 05:55

## 2023-08-25 RX ADMIN — FUROSEMIDE 20 MG: 20 TABLET ORAL at 05:55

## 2023-08-25 RX ADMIN — SOTALOL HYDROCHLORIDE 80 MG: 80 TABLET ORAL at 05:55

## 2023-08-25 ASSESSMENT — COGNITIVE AND FUNCTIONAL STATUS - GENERAL
SUGGESTED CMS G CODE MODIFIER DAILY ACTIVITY: CH
DAILY ACTIVITIY SCORE: 24
SUGGESTED CMS G CODE MODIFIER MOBILITY: CH
MOBILITY SCORE: 24

## 2023-08-25 NOTE — PROGRESS NOTES
Received report from Ari RN, pt care assumed. Pt resting comfortably in bed on RA. Pt AAOx4, denies pain, SOB, nausea, dizziness. POC updated w/ pt, pt agreeable. Safety precautions in place. No additional needs at this time, hourly rounding initiated.

## 2023-08-25 NOTE — CARE PLAN
The patient is Stable - Low risk of patient condition declining or worsening    Shift Goals  Clinical Goals: VSS, hemodynamic stability  Patient Goals: rest, discharge  Family Goals: ABA    Progress made toward(s) clinical / shift goals:      Problem: Knowledge Deficit - Standard  Goal: Patient and family/care givers will demonstrate understanding of plan of care, disease process/condition, diagnostic tests and medications  Outcome: Met     Problem: Pain - Standard  Goal: Alleviation of pain or a reduction in pain to the patient’s comfort goal  Outcome: Met     Problem: Fall Risk  Goal: Patient will remain free from falls  Outcome: Met       Patient is not progressing towards the following goals:

## 2023-08-25 NOTE — DISCHARGE SUMMARY
Discharge Summary    CHIEF COMPLAINT ON ADMISSION     CODE STATUS  Full     HPI & HOSPITAL COURSE  Fabio Kruger is a very pleasant patient of Dr. Mathew Santa admitted on 8/24/2023 for elective catheter ablation due to persistent atrial fibrillation.     Underwent extensive AF ablation (please see EP OP note for full detail) and was started on Sotalol 80 mg.     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       Post-ablation, the patient recovered well with no complications. The left and right femoral cath site reamined clean, dry, and intact with no signs of hematoma, bleeding, or infection. Patient was able to ambulate the halls without any exertional angina, vitals and laboratory workup remained unremarkable.      QTc post 2nd dose of Sotalol remained stable at approximately 506 m/s. Patient okay for discharge on Sotalol 80 mg daily per Dr. Santa.      Reviewed discharge instructions with patient related to lifting precautions for one week and the importance and rationale of daily omeprazole for 30 days post procedure.      Thoroughly discussed the discharge instructions as below with patient, patient verbalized understanding and denied having any further questions.     Patient was discharged home with family with no further questions/concerns, their outpatient follow up has been arranged by our office as below.     FOLLOW UP  Future Appointments   Date Time Provider Department Center   9/13/2023  1:45 PM ELLIE Sosa [facility] None   10/17/2023  2:00 PM Sawyer Valderrama M.D. [facility]   10/18/2023 11:30 AM PFT-RM2 [facility] None   11/9/2023 11:15 AM PACER CHECK-CAM B 2 [facility] None   11/28/2023  3:40 PM Mathew Santa M.D. [facility] None   1/16/2024 11:30 AM Barb Mix M.D. [facility] None       MEDICATIONS ON DISCHARGE     Medication List        START taking these medications        Instructions   omeprazole 20 MG delayed-release capsule  Commonly  known as: PriLOSEC   Take 1 Capsule by mouth every day. Take every morning for 30 days post ablation to protect your esophagus.  Dose: 20 mg     sotalol 80 MG Tabs  Commonly known as: Betapace   Take 1 Tablet by mouth every day.  Dose: 80 mg            CONTINUE taking these medications        Instructions   budesonide-formoterol 160-4.5 MCG/ACT Aero  Commonly known as: Symbicort   Doctor's comments: 3 month supply  Inhale 2 Puffs 2 times a day. Use with spacer.  Rinse mouth after each use.  Dose: 2 Puff     Eliquis 5mg Tabs  Generic drug: apixaban   TAKE 1 TABLET TWICE A DAY     FISH OIL PO   Take 1 Capsule by mouth every day.  Dose: 1 Capsule     furosemide 20 MG Tabs  Commonly known as: Lasix   Take 1 Tablet by mouth every day.  Dose: 20 mg     Glucosamine 1500 Complex Caps   Take 1 Capsule by mouth every day.  Dose: 1 Capsule     Iron 325 (65 Fe) MG Tabs   Take 325 mg by mouth every day.  Dose: 325 mg     levothyroxine 88 MCG Tabs  Commonly known as: Synthroid   Take 88 mcg by mouth Every morning on an empty stomach.  Dose: 88 mcg     losartan-hydrochlorothiazide 50-12.5 MG per tablet  Commonly known as: Hyzaar   Take 1 Tablet by mouth every day.  Dose: 1 Tablet     potassium chloride SA 10 MEQ Tbcr  Commonly known as: K-Dur   Take 2 Tablets by mouth 2 times a day.  Dose: 20 mEq     rosuvastatin 10 MG Tabs  Commonly known as: Crestor   TAKE 1 TABLET EVERY EVENING     tamsulosin 0.4 MG capsule  Commonly known as: Flomax   Take 0.4 mg by mouth every evening.  Dose: 0.4 mg            STOP taking these medications      metoprolol SR 25 MG Tb24  Commonly known as: Toprol XL

## 2023-08-25 NOTE — PROGRESS NOTES
Report received from night shift RN, pt care assumed, tele box on. VSS, pt assessment complete. Pt A&Ox4, no signs of distress noted at this time. POC discussed with pt and verbalizes no questions. Last dose of sotalol given at 0600, ECG will be done at 0800. Pt denies any additional needs at this time. Bed in lowest position, bed alarm on, pt educated on fall risk and verbalized understanding, call light within reach, hourly rounding initiated.

## 2023-08-25 NOTE — CARE PLAN
The patient is Stable - Low risk of patient condition declining or worsening    Shift Goals  Clinical Goals: VSS, monitor p/o  Patient Goals: rest, discharge  Family Goals: ABA    Progress made toward(s) clinical / shift goals:        Problem: Knowledge Deficit - Standard  Goal: Patient and family/care givers will demonstrate understanding of plan of care, disease process/condition, diagnostic tests and medications  Description: Target End Date:  1-3 days or as soon as patient condition allows    Document in Patient Education    1.  Patient and family/caregiver oriented to unit, equipment, visitation policy and means for communicating concern  2.  Complete/review Learning Assessment  3.  Assess knowledge level of disease process/condition, treatment plan, diagnostic tests and medications  4.  Explain disease process/condition, treatment plan, diagnostic tests and medications  Outcome: Progressing     Problem: Pain - Standard  Goal: Alleviation of pain or a reduction in pain to the patient’s comfort goal  Description: Target End Date:  Prior to discharge or change in level of care    Document on Vitals flowsheet    1.  Document pain using the appropriate pain scale per order or unit policy  2.  Educate and implement non-pharmacologic comfort measures (i.e. relaxation, distraction, massage, cold/heat therapy, etc.)  3.  Pain management medications as ordered  4.  Reassess pain after pain med administration per policy  5.  If opiods administered assess patient's response to pain medication is appropriate per POSS sedation scale  6.  Follow pain management plan developed in collaboration with patient and interdisciplinary team (including palliative care or pain specialists if applicable)  Outcome: Progressing       Patient is not progressing towards the following goals:

## 2023-08-25 NOTE — PROGRESS NOTES
Pt given discharge instructions.  Discussed diet, activity, follow up appointments, symptoms and management, and prescriptions provided.  Packet sent with patient.  IV d/c'd, tele box off, and all questions answered.

## 2023-08-30 ENCOUNTER — TELEPHONE (OUTPATIENT)
Dept: CARDIOLOGY | Facility: MEDICAL CENTER | Age: 78
End: 2023-08-30
Payer: MEDICARE

## 2023-08-30 DIAGNOSIS — I44.0 AV BLOCK, 1ST DEGREE: ICD-10-CM

## 2023-08-30 DIAGNOSIS — Z86.79 S/P ABLATION OF ATRIAL FIBRILLATION: ICD-10-CM

## 2023-08-30 DIAGNOSIS — I44.0 FIRST DEGREE AV BLOCK: ICD-10-CM

## 2023-08-30 DIAGNOSIS — Z79.899 ON AMIODARONE THERAPY: ICD-10-CM

## 2023-08-30 DIAGNOSIS — I44.30 AV BLOCK: ICD-10-CM

## 2023-08-30 DIAGNOSIS — I48.19 PERSISTENT ATRIAL FIBRILLATION (HCC): ICD-10-CM

## 2023-08-30 DIAGNOSIS — Z98.890 S/P ABLATION OF ATRIAL FIBRILLATION: ICD-10-CM

## 2023-08-30 DIAGNOSIS — I48.0 PAROXYSMAL ATRIAL FIBRILLATION (HCC): ICD-10-CM

## 2023-08-30 RX ORDER — SOTALOL HYDROCHLORIDE 80 MG/1
80 TABLET ORAL 2 TIMES DAILY
Qty: 180 TABLET | Refills: 2
Start: 2023-08-30 | End: 2023-09-01 | Stop reason: SDUPTHER

## 2023-08-30 NOTE — TELEPHONE ENCOUNTER
Remote transmission 8/30/2023:    Pt presenting in AF/AFL, episode appears to have begun 8/29/2023@9:12 pm.    1-AF/AFl episode 8/27/2023 lasting roughly 11.5 hrs.    Pt is s/p ablation 8/25/2023.    Full report scanned into media.

## 2023-08-30 NOTE — TELEPHONE ENCOUNTER
Placed orders.  Called and spoke with patient.    Patient states he has mild SOB, no other symptoms. Patient states SOB is an improvement from prior to procedure.     Discussed DS recommendations, patient verbalized understanding, will increase sotalol to 80 BID,  Scheduled patient for non provider EKG friday.    Discussed ER precautions, patient verbalized understanding.

## 2023-08-31 NOTE — TELEPHONE ENCOUNTER
DS    Caller: Giovany Kruger    Topic/issue: Patient calling back in regards to the EKG scheduled on 09/01/23. Patient does not want to drive to [place] for this and is asking if this can be done in [place]? Please advise.     Callback Number: [phone] (home)     Thank you,  Zeynep PIPER

## 2023-08-31 NOTE — TELEPHONE ENCOUNTER
Called patient,    Patient states he feels poorly and is declining. Patient states he has SOB, stomach pains, diarrhea, fatigue.  Stressed to patient that I advise he go to ED, patient refused ED at this time.  Patient to come in tomorrow to see MT and get EKG.    Discussed ED precautions, patient verbalized understanding.

## 2023-09-01 ENCOUNTER — OFFICE VISIT (OUTPATIENT)
Dept: CARDIOLOGY | Facility: MEDICAL CENTER | Age: 78
End: 2023-09-01
Attending: NURSE PRACTITIONER
Payer: MEDICARE

## 2023-09-01 VITALS
HEART RATE: 70 BPM | SYSTOLIC BLOOD PRESSURE: 106 MMHG | DIASTOLIC BLOOD PRESSURE: 68 MMHG | BODY MASS INDEX: 24.38 KG/M2 | OXYGEN SATURATION: 95 % | RESPIRATION RATE: 16 BRPM | HEIGHT: 74 IN | WEIGHT: 190 LBS

## 2023-09-01 DIAGNOSIS — Z86.79 S/P ABLATION OF ATRIAL FIBRILLATION: ICD-10-CM

## 2023-09-01 DIAGNOSIS — I48.92 ATRIAL FLUTTER, UNSPECIFIED TYPE (HCC): Primary | ICD-10-CM

## 2023-09-01 DIAGNOSIS — I48.0 PAROXYSMAL ATRIAL FIBRILLATION (HCC): ICD-10-CM

## 2023-09-01 DIAGNOSIS — I44.30 AV BLOCK: ICD-10-CM

## 2023-09-01 DIAGNOSIS — I48.19 PERSISTENT ATRIAL FIBRILLATION (HCC): ICD-10-CM

## 2023-09-01 DIAGNOSIS — I44.0 FIRST DEGREE AV BLOCK: ICD-10-CM

## 2023-09-01 DIAGNOSIS — I44.0 AV BLOCK, 1ST DEGREE: ICD-10-CM

## 2023-09-01 DIAGNOSIS — Z98.890 S/P ABLATION OF ATRIAL FIBRILLATION: ICD-10-CM

## 2023-09-01 PROCEDURE — 3074F SYST BP LT 130 MM HG: CPT | Performed by: NURSE PRACTITIONER

## 2023-09-01 PROCEDURE — 3078F DIAST BP <80 MM HG: CPT | Performed by: NURSE PRACTITIONER

## 2023-09-01 PROCEDURE — 93005 ELECTROCARDIOGRAM TRACING: CPT | Performed by: NURSE PRACTITIONER

## 2023-09-01 PROCEDURE — 99213 OFFICE O/P EST LOW 20 MIN: CPT | Performed by: NURSE PRACTITIONER

## 2023-09-01 PROCEDURE — 93010 ELECTROCARDIOGRAM REPORT: CPT | Performed by: INTERNAL MEDICINE

## 2023-09-01 PROCEDURE — 99215 OFFICE O/P EST HI 40 MIN: CPT | Performed by: NURSE PRACTITIONER

## 2023-09-01 RX ORDER — SOTALOL HYDROCHLORIDE 80 MG/1
80 TABLET ORAL 2 TIMES DAILY
Qty: 180 TABLET | Refills: 3 | Status: SHIPPED | OUTPATIENT
Start: 2023-09-01

## 2023-09-01 ASSESSMENT — ENCOUNTER SYMPTOMS
MUSCULOSKELETAL NEGATIVE: 1
NAUSEA: 0
PALPITATIONS: 0
NEUROLOGICAL NEGATIVE: 1
ORTHOPNEA: 0
SHORTNESS OF BREATH: 1
CLAUDICATION: 0
HALLUCINATIONS: 0
DIZZINESS: 0
GASTROINTESTINAL NEGATIVE: 1
EYES NEGATIVE: 1
NERVOUS/ANXIOUS: 0
BRUISES/BLEEDS EASILY: 0
WHEEZING: 0
SENSORY CHANGE: 0
PND: 0
DEPRESSION: 0

## 2023-09-01 ASSESSMENT — FIBROSIS 4 INDEX: FIB4 SCORE: 2.64

## 2023-09-01 NOTE — PROGRESS NOTES
Atrial Fibrillation Follow up Note    DOS: 9/1/2023  [id number]  Giovany Kruger    Chief complaint/Reason for consult: post op ablation    HPI: Pt is a 78 y.o. male who presents to the clinic today in follow up for post op ablation for atrial fibrillation and flutter. 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 Patient underwent ablation approximately 8 days ago with targeted ablative sites of isolation of all four pulmonary veins, cavo-tricuspid isthmus line, posterior wall isolation, vein of Malcolm ethanol ablation, and mitral line. Patient started having paroxysms of atrial arrhythmia on 8/27 and since 8/29 at 2000 has been in flutter since. He gets very dyspneic with exertion. Today he started taking sotalol 80mg twice daily and we will plan for cardioversion next week to restore sinus rhythm. Patient was instructed if he feels light headed, breathing gets worse, or he feels he may pass out to go to ER for cardioversion. We discussed his procedure in detail as well as discussed restoration of sinus rhythm to help in healing. Patient throat is still sore however there is no problem swallowing food or water. Improving gradually. Was sick and had emesis and diarrhea on Saturday after procedure. Better since. Will maintain current follow up appt and plan for cardioversion next week. He also asked about Lasix need for long term. This was started in June when he went to [facility] in [place]. I asked if the Lasix helped improve his shortness of breath symptoms when he left the hospital there and he said no. BNP was 1995 and this is suggestive of fluid accumulation. Echo on 6/22 showed normal LVEF 55%.       Past Medical History:   Diagnosis Date    Arrhythmia     ASTHMA     Atrial fibrillation  (HCC)     Back pain     Blood clotting disorder (HCC) I take Eliquis blood thinner    Breath shortness For several years    Chickenpox     Detached retina, left 2023    Tristanian measles     Heart murmur If related to afib    Heart valve disease Afib diagnosed several years ago    Hemorrhagic disorder (HCC)     On Eliquis    Hiatus hernia syndrome several years ago, but not sure what Hiatal means    High cholesterol About 1 year ago    Hypertension     Pacemaker     Peripheral vascular disease, unspecified (HCC)     Stroke (HCC)     Tonsillitis     Unspecified disorder of thyroid        Past Surgical History:   Procedure Laterality Date    EYE SURGERY Left 2023    INGUINAL HERNIA REPAIR  2012    Performed by RIGOBERTO MCINTYRE at SURGERY [facility]    INGUINAL HERNIA REPAIR  2011    Performed by RIGOBERTO MCINTYRE at SURGERY [facility]    OTHER      L3-4-5 susion    OTHER      C1-2-3 fusion    TONSILLECTOMY      CARPAL TUNNEL RELEASE         Social History     Socioeconomic History    Marital status:      Spouse name: Not on file    Number of children: Not on file    Years of education: Not on file    Highest education level: Not on file   Occupational History    Not on file   Tobacco Use    Smoking status: Former     Current packs/day: 0.00     Average packs/day: 1 pack/day for 20.0 years (20.0 ttl pk-yrs)     Types: Cigarettes     Start date: 1960     Quit date: 1980     Years since quittin.6     Passive exposure: Never    Smokeless tobacco: Never   Vaping Use    Vaping Use: Never used   Substance and Sexual Activity    Alcohol use: Yes     Alcohol/week: 4.2 - 16.8 oz     Types: 7 Standard drinks or equivalent per week     Comment: 7 DRINKS PER WEEK    Drug use: Never     Types: Marijuana, Oral     Comment: has not used 60 years ago     Sexual activity: Not on file   Other Topics Concern    Not on file   Social History Narrative    Not on file     Social  Determinants of Health     Financial Resource Strain: Not on file   Food Insecurity: Not on file   Transportation Needs: Not on file   Physical Activity: Not on file   Stress: Not on file   Social Connections: Not on file   Intimate Partner Violence: Not on file   Housing Stability: Not on file       Family History   Problem Relation Age of Onset    Cancer Other     Diabetes Mother     No Known Problems Father     Lung Disease Sister     Cancer Brother        Allergies   Allergen Reactions    Amiodarone      Caused detached retina     Tape Unspecified     Blisters and scarring.  Paper tape    Flecainide        Current Outpatient Medications   Medication Sig Dispense Refill    sotalol (BETAPACE) 80 MG Tab Take 1 Tablet by mouth 2 times a day. 180 Tablet 3    omeprazole (PRILOSEC) 20 MG delayed-release capsule Take 1 Capsule by mouth every day. Take every morning for 30 days post ablation to protect your esophagus. 30 Capsule 0    furosemide (LASIX) 20 MG Tab Take 1 Tablet by mouth every day. 90 Tablet 3    potassium chloride SA (K-DUR) 10 MEQ Tab CR Take 2 Tablets by mouth 2 times a day. 90 Tablet 3    ELIQUIS 5 MG Tab TAKE 1 TABLET TWICE A  Tablet 1    rosuvastatin (CRESTOR) 10 MG Tab TAKE 1 TABLET EVERY EVENING 90 Tablet 1    budesonide-formoterol (SYMBICORT) 160-4.5 MCG/ACT Aerosol Inhale 2 Puffs 2 times a day. Use with spacer.  Rinse mouth after each use. 3 Each 3    tamsulosin (FLOMAX) 0.4 MG capsule Take 0.4 mg by mouth every evening.      losartan-hydrochlorothiazide (HYZAAR) 50-12.5 MG per tablet Take 1 Tablet by mouth every day. 90 Tablet 7    Omega-3 Fatty Acids (FISH OIL PO) Take 1 Capsule by mouth every day.      Ferrous Sulfate (IRON) 325 (65 Fe) MG Tab Take 325 mg by mouth every day.      levothyroxine (SYNTHROID) 88 MCG Tab Take 88 mcg by mouth Every morning on an empty stomach.      Glucosamine-Chondroit-Vit C-Mn (GLUCOSAMINE 1500 COMPLEX) Cap Take 1 Capsule by mouth every day.       No current  facility-administered medications for this visit.       Vitals:    09/01/23 1535   BP: 106/68   Pulse: 70   Resp: 16   SpO2: 95%         Review of Systems   Constitutional:  Positive for malaise/fatigue.   HENT: Negative.     Eyes: Negative.    Respiratory:  Positive for shortness of breath. Negative for wheezing.    Cardiovascular:  Negative for chest pain, palpitations, orthopnea, claudication, leg swelling and PND.   Gastrointestinal: Negative.  Negative for nausea.   Genitourinary: Negative.    Musculoskeletal: Negative.    Skin: Negative.    Neurological: Negative.  Negative for dizziness and sensory change.   Endo/Heme/Allergies: Negative.  Does not bruise/bleed easily.   Psychiatric/Behavioral:  Negative for depression and hallucinations. The patient is not nervous/anxious.             EKG interpreted by me: Atrial flutter with ventricular pacing     Confirmation of Atrial flutter and time of re-occurrence per device quick look     Physical Exam  Constitutional:       Appearance: Normal appearance.   HENT:      Head: Normocephalic.   Eyes:      Pupils: Pupils are equal, round, and reactive to light.   Neck:      Vascular: No JVD.   Cardiovascular:      Rate and Rhythm: Normal rate.      Pulses: Normal pulses.      Heart sounds: Normal heart sounds.      Comments: flutter  Pulmonary:      Effort: Pulmonary effort is normal.      Breath sounds: Normal breath sounds.   Abdominal:      General: Abdomen is flat.      Palpations: Abdomen is soft.   Musculoskeletal:      Cervical back: Normal range of motion.      Right lower leg: No edema.      Left lower leg: No edema.   Skin:     General: Skin is warm and dry.   Neurological:      Mental Status: He is alert and oriented to person, place, and time.   Psychiatric:         Mood and Affect: Mood normal.         Behavior: Behavior normal.          Data:  Lipids:   Lab Results   Component Value Date/Time    CHOLSTRLTOT 143 05/12/2023 09:03 AM    TRIGLYCERIDE 45  05/12/2023 09:03 AM    HDL 85.0 (H) 05/12/2023 09:03 AM    LDL 49 05/12/2023 09:03 AM        BMP:  Lab Results   Component Value Date/Time    SODIUM 136 08/25/2023 0019    POTASSIUM 3.9 08/25/2023 0019    CHLORIDE 104 08/25/2023 0019    CO2 21 08/25/2023 0019    GLUCOSE 131 (H) 08/25/2023 0019    BUN 31 (H) 08/25/2023 0019    CREATININE 1.21 08/25/2023 0019    CALCIUM 8.5 08/25/2023 0019    ANION 11.0 08/25/2023 0019       GFR:  Lab Results   Component Value Date/Time    IFAFRICA 55 (A) 05/03/2021 0719    IFNOTAFR 46 (A) 05/03/2021 0719        TSH:   Lab Results   Component Value Date/Time    TSHULTRASEN 3.500 08/24/2023 0720       MAGNESIUM:  Lab Results   Component Value Date/Time    MAGNESIUM 2.0 08/25/2023 0019    MAGNESIUM 2.0 08/24/2023 1658    MAGNESIUM 2.2 08/24/2023 0720        THYROXINE (T4):   Lab Results   Component Value Date/Time    FREEDIR 1.52 07/07/2015 0820        CBC:   Lab Results   Component Value Date/Time    WBC 8.8 08/25/2023 12:19 AM    RBC 3.82 (L) 08/25/2023 12:19 AM    HEMOGLOBIN 11.9 (L) 08/25/2023 12:19 AM    HEMATOCRIT 35.6 (L) 08/25/2023 12:19 AM    MCV 93.2 08/25/2023 12:19 AM    MCH 31.2 08/25/2023 12:19 AM    MCHC 33.4 08/25/2023 12:19 AM    RDW 45.2 08/25/2023 12:19 AM    PLATELETCT 230 08/25/2023 12:19 AM    MPV 10.1 08/25/2023 12:19 AM    NEUTSPOLYS 61.70 08/24/2023 07:20 AM    LYMPHOCYTES 24.90 08/24/2023 07:20 AM    MONOCYTES 7.20 08/24/2023 07:20 AM    EOSINOPHILS 5.00 08/24/2023 07:20 AM    BASOPHILS 0.60 08/24/2023 07:20 AM    IMMGRAN 0.60 08/24/2023 07:20 AM    IMMGRAN 0 12/23/2016 08:24 AM    NRBC 0.00 08/24/2023 07:20 AM    NEUTS 3.93 08/24/2023 07:20 AM    NEUTS 2.5 12/23/2016 08:24 AM    LYMPHS 1.59 08/24/2023 07:20 AM    LYMPHS 1.2 12/23/2016 08:24 AM    MONOS 0.46 08/24/2023 07:20 AM    MONOS 0.3 12/23/2016 08:24 AM    EOS 0.32 08/24/2023 07:20 AM    EOS 0.2 12/23/2016 08:24 AM    BASO 0.04 08/24/2023 07:20 AM    BASO 0.0 12/23/2016 08:24 AM    IMMGRANAB 0.04  08/24/2023 07:20 AM    IMMGRANAB 0.0 12/23/2016 08:24 AM    NRBCAB 0.00 08/24/2023 07:20 AM        CBC w/o DIFF  Lab Results   Component Value Date/Time    WBC 8.8 08/25/2023 12:19 AM    RBC 3.82 (L) 08/25/2023 12:19 AM    HEMOGLOBIN 11.9 (L) 08/25/2023 12:19 AM    MCV 93.2 08/25/2023 12:19 AM    MCH 31.2 08/25/2023 12:19 AM    MCHC 33.4 08/25/2023 12:19 AM    RDW 45.2 08/25/2023 12:19 AM    MPV 10.1 08/25/2023 12:19 AM       LIVER:  Lab Results   Component Value Date/Time    ALKPHOSPHAT 54 08/25/2023 12:19 AM    ASTSGOT 37 08/25/2023 12:19 AM    ALTSGPT 22 08/25/2023 12:19 AM    TBILIRUBIN 0.9 08/25/2023 12:19 AM       BNP:  Lab Results   Component Value Date/Time    BNPBTYPENAT 52 01/11/2018 11:54 AM       PT/INR:  Lab Results   Component Value Date/Time    PROTHROMBTM 18.1 (H) 08/24/2023 07:20 AM    PROTHROMBTM 17.6 (H) 04/21/2023 10:00 AM    INR 1.48 (H) 08/24/2023 07:20 AM    INR 1.47 (H) 04/21/2023 10:00 AM             Impression/Plan:  1. Atrial flutter, unspecified type (HCC)  CL-CARDIOVERSION      2. S/P ablation of atrial fibrillation  EKG    sotalol (BETAPACE) 80 MG Tab      3. Persistent atrial fibrillation (HCC)  sotalol (BETAPACE) 80 MG Tab      4. Paroxysmal atrial fibrillation (HCC)  sotalol (BETAPACE) 80 MG Tab      5. AV block, 1st degree  sotalol (BETAPACE) 80 MG Tab      6. AV block  sotalol (BETAPACE) 80 MG Tab      7. First degree AV block  sotalol (BETAPACE) 80 MG Tab       - recurrent flutter S/P ablation   - patient starting sotalol 80mg twice daily dosing tonight    - plan for cardioversion next week   - dyspneic on exertion since recurrence   - continue Eliquis 5mg twice daily for stroke protection   - will see patient back in 2 weeks for normal follow up to ablation   - call if anything gets worse   - if he gets light headed feels pre syncopal go to ER for cardioversion to restore sinus   - Qtc calculated 475        A total of 40 minutes of time was spent on day of encounter reviewing  medical record, performing history and examination, counseling, ordering medication/test/consults, collaborating with referring service, and documentation.    Cm Beck AGACNP-EP  Cardiac Electrophysiology

## 2023-09-02 ENCOUNTER — PATIENT MESSAGE (OUTPATIENT)
Dept: CARDIOLOGY | Facility: MEDICAL CENTER | Age: 78
End: 2023-09-02
Payer: MEDICARE

## 2023-09-05 ENCOUNTER — TELEPHONE (OUTPATIENT)
Dept: CARDIOLOGY | Facility: MEDICAL CENTER | Age: 78
End: 2023-09-05
Payer: MEDICARE

## 2023-09-05 ENCOUNTER — APPOINTMENT (OUTPATIENT)
Dept: ADMISSIONS | Facility: MEDICAL CENTER | Age: 78
End: 2023-09-05
Attending: INTERNAL MEDICINE
Payer: MEDICARE

## 2023-09-05 NOTE — TELEPHONE ENCOUNTER
Not able to get ahold of the patient yet to schedule this cardioversion. I only have an opening for tomorrow. If I dont hear back by 3pm, I can no longer schedule it. And I have no more openings in  Echo 3 at this time for the remainder of the week. Do you want me to try and get patient on next week?

## 2023-09-05 NOTE — TELEPHONE ENCOUNTER
Edmond Hahn: please schedule patient for cardioversion with BD this week per MT. Has been discussed with BD by MT.

## 2023-09-05 NOTE — TELEPHONE ENCOUNTER
Jovani Carty  Phone Number: [phone]     Please schedule CV this week with  with cons sed      Left message on patient's voicemail to get this scheduled. Only opening I have this week is tomorrow, 09-06, with a 6 am c/I. Waiting to hear back from patient to get scheduled.

## 2023-09-05 NOTE — TELEPHONE ENCOUNTER
Patient is scheduled for a Cardioversion without anesthesia on 09- with Dr. Morris. Patient has been instructed to check in at 6:00 am for 8:00 procedure. No meds to hold. Message sent to authorizations.  Tanja Ye notified of case. FYI sent to Kiet Mancini Dorgan. Case emailed to cath lab scheduling.

## 2023-09-06 ENCOUNTER — APPOINTMENT (OUTPATIENT)
Dept: CARDIOLOGY | Facility: MEDICAL CENTER | Age: 78
End: 2023-09-06
Attending: NURSE PRACTITIONER
Payer: MEDICARE

## 2023-09-06 ENCOUNTER — HOSPITAL ENCOUNTER (OUTPATIENT)
Facility: MEDICAL CENTER | Age: 78
End: 2023-09-06
Attending: INTERNAL MEDICINE | Admitting: INTERNAL MEDICINE
Payer: MEDICARE

## 2023-09-06 VITALS
RESPIRATION RATE: 16 BRPM | OXYGEN SATURATION: 97 % | DIASTOLIC BLOOD PRESSURE: 62 MMHG | HEART RATE: 62 BPM | TEMPERATURE: 96.8 F | HEIGHT: 74 IN | BODY MASS INDEX: 24.67 KG/M2 | SYSTOLIC BLOOD PRESSURE: 109 MMHG | WEIGHT: 192.24 LBS

## 2023-09-06 DIAGNOSIS — I48.92 ATRIAL FLUTTER, UNSPECIFIED TYPE (HCC): ICD-10-CM

## 2023-09-06 LAB
ANION GAP SERPL CALC-SCNC: 8 MMOL/L (ref 7–16)
BUN SERPL-MCNC: 21 MG/DL (ref 8–22)
CALCIUM SERPL-MCNC: 8.5 MG/DL (ref 8.5–10.5)
CHLORIDE SERPL-SCNC: 107 MMOL/L (ref 96–112)
CO2 SERPL-SCNC: 25 MMOL/L (ref 20–33)
CREAT SERPL-MCNC: 1.29 MG/DL (ref 0.5–1.4)
EKG IMPRESSION: NORMAL
ERYTHROCYTE [DISTWIDTH] IN BLOOD BY AUTOMATED COUNT: 45.2 FL (ref 35.9–50)
GFR SERPLBLD CREATININE-BSD FMLA CKD-EPI: 57 ML/MIN/1.73 M 2
GLUCOSE SERPL-MCNC: 93 MG/DL (ref 65–99)
HCT VFR BLD AUTO: 36.6 % (ref 42–52)
HGB BLD-MCNC: 12.3 G/DL (ref 14–18)
INR PPP: 1.32 (ref 0.87–1.13)
MCH RBC QN AUTO: 31.5 PG (ref 27–33)
MCHC RBC AUTO-ENTMCNC: 33.6 G/DL (ref 32.3–36.5)
MCV RBC AUTO: 93.8 FL (ref 81.4–97.8)
PLATELET # BLD AUTO: 262 K/UL (ref 164–446)
PMV BLD AUTO: 9.5 FL (ref 9–12.9)
POTASSIUM SERPL-SCNC: 4.1 MMOL/L (ref 3.6–5.5)
PROTHROMBIN TIME: 16.6 SEC (ref 12–14.6)
RBC # BLD AUTO: 3.9 M/UL (ref 4.7–6.1)
SODIUM SERPL-SCNC: 140 MMOL/L (ref 135–145)
WBC # BLD AUTO: 6.9 K/UL (ref 4.8–10.8)

## 2023-09-06 PROCEDURE — 93010 ELECTROCARDIOGRAM REPORT: CPT | Performed by: INTERNAL MEDICINE

## 2023-09-06 PROCEDURE — 36415 COLL VENOUS BLD VENIPUNCTURE: CPT

## 2023-09-06 PROCEDURE — 85027 COMPLETE CBC AUTOMATED: CPT

## 2023-09-06 PROCEDURE — 93005 ELECTROCARDIOGRAM TRACING: CPT | Performed by: INTERNAL MEDICINE

## 2023-09-06 PROCEDURE — 80048 BASIC METABOLIC PNL TOTAL CA: CPT

## 2023-09-06 PROCEDURE — 85610 PROTHROMBIN TIME: CPT

## 2023-09-06 ASSESSMENT — FIBROSIS 4 INDEX: FIB4 SCORE: 2.64

## 2023-09-06 NOTE — PROGRESS NOTES
Patient in pre-op, assessment completed, patient and wife updated on plan of care, all questions answered, no further needs at this time, call light within reach.     Pacemaker rep in pre-op interrogating pacer to determine heart rhythm before cardioversion today.     Dr. Morris in pre-op talking to patient, updating patient that Cardioversion is cancelled today due to patient being back in Sinus Rhythm, answering all questions and concerns.

## 2023-09-07 ENCOUNTER — NON-PROVIDER VISIT (OUTPATIENT)
Dept: CARDIOLOGY | Facility: MEDICAL CENTER | Age: 78
End: 2023-09-07
Payer: MEDICARE

## 2023-09-07 PROCEDURE — 93294 REM INTERROG EVL PM/LDLS PM: CPT | Performed by: INTERNAL MEDICINE

## 2023-09-07 NOTE — CARDIAC REMOTE MONITOR - SCAN
Device transmission reviewed. Device demonstrated appropriate function.       Electronically Signed by: Vipul Lew M.D.    9/11/2023  8:59 PM

## 2023-09-12 LAB — EKG IMPRESSION: NORMAL

## 2023-09-13 ENCOUNTER — OFFICE VISIT (OUTPATIENT)
Dept: CARDIOLOGY | Facility: MEDICAL CENTER | Age: 78
End: 2023-09-13
Attending: NURSE PRACTITIONER
Payer: MEDICARE

## 2023-09-13 VITALS
HEIGHT: 74 IN | RESPIRATION RATE: 16 BRPM | WEIGHT: 189 LBS | BODY MASS INDEX: 24.26 KG/M2 | OXYGEN SATURATION: 96 % | SYSTOLIC BLOOD PRESSURE: 106 MMHG | HEART RATE: 66 BPM | DIASTOLIC BLOOD PRESSURE: 62 MMHG

## 2023-09-13 DIAGNOSIS — Z86.79 S/P ABLATION OF ATRIAL FIBRILLATION: ICD-10-CM

## 2023-09-13 DIAGNOSIS — Z98.890 S/P ABLATION OF ATRIAL FIBRILLATION: ICD-10-CM

## 2023-09-13 DIAGNOSIS — Z95.0 CARDIAC PACEMAKER IN SITU: ICD-10-CM

## 2023-09-13 DIAGNOSIS — Z79.01 CHRONIC ANTICOAGULATION: ICD-10-CM

## 2023-09-13 DIAGNOSIS — I48.92 ATRIAL FLUTTER, UNSPECIFIED TYPE (HCC): ICD-10-CM

## 2023-09-13 DIAGNOSIS — Z79.899 ENCOUNTER FOR MONITORING SOTALOL THERAPY: ICD-10-CM

## 2023-09-13 DIAGNOSIS — Z51.81 ENCOUNTER FOR MONITORING SOTALOL THERAPY: ICD-10-CM

## 2023-09-13 LAB — EKG IMPRESSION: NORMAL

## 2023-09-13 PROCEDURE — 99212 OFFICE O/P EST SF 10 MIN: CPT | Performed by: NURSE PRACTITIONER

## 2023-09-13 PROCEDURE — 3074F SYST BP LT 130 MM HG: CPT | Performed by: NURSE PRACTITIONER

## 2023-09-13 PROCEDURE — 93010 ELECTROCARDIOGRAM REPORT: CPT | Performed by: INTERNAL MEDICINE

## 2023-09-13 PROCEDURE — 99214 OFFICE O/P EST MOD 30 MIN: CPT | Performed by: NURSE PRACTITIONER

## 2023-09-13 PROCEDURE — 3078F DIAST BP <80 MM HG: CPT | Performed by: NURSE PRACTITIONER

## 2023-09-13 PROCEDURE — 93005 ELECTROCARDIOGRAM TRACING: CPT | Performed by: NURSE PRACTITIONER

## 2023-09-13 ASSESSMENT — ENCOUNTER SYMPTOMS
DEPRESSION: 0
EYES NEGATIVE: 1
PND: 0
BRUISES/BLEEDS EASILY: 0
DIZZINESS: 0
CLAUDICATION: 0
SHORTNESS OF BREATH: 0
ORTHOPNEA: 0
CONSTITUTIONAL NEGATIVE: 1
NAUSEA: 0
PALPITATIONS: 0
NEUROLOGICAL NEGATIVE: 1
GASTROINTESTINAL NEGATIVE: 1
NERVOUS/ANXIOUS: 0
HALLUCINATIONS: 0
COUGH: 1
WHEEZING: 0
MUSCULOSKELETAL NEGATIVE: 1
SENSORY CHANGE: 0

## 2023-09-13 ASSESSMENT — FIBROSIS 4 INDEX: FIB4 SCORE: 2.32

## 2023-09-13 NOTE — PROGRESS NOTES
Atrial Fibrillation Follow up Note    DOS: 9/13/2023  [id number]  Giovany Kruger    Chief complaint/Reason for consult: post op ablation and cardioversion    HPI: Pt is a 78 y.o. male who presents to the clinic today in follow up for post op ablation and cardioversion. 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 Patient underwent ablation on 8/24, he had recurrence of atrial flutter on 8/29. Patient increased sotalol to twice daily and was scheduled for cardioversion on 9/6 but was found to be in sinus rhythm prior to cardioversion. Patient sent in remote device report on 9/7 which showed his burden dropping and no new AMS episodes since 9/6. Quick check of his device episodes and still none. Patient feeling better as well. A slight cough that is improving. We discussed the procedure and healing as well as lifestyle modification.       Past Medical History:   Diagnosis Date    Arrhythmia     ASTHMA     Atrial fibrillation (HCC)     Back pain     Blood clotting disorder (HCC) I take Eliquis blood thinner    Breath shortness For several years    Chickenpox     Detached retina, left 03/02/2023    [place] measles     Heart murmur If related to afib    Heart valve disease Afib diagnosed several years ago    Hemorrhagic disorder (HCC)     On Eliquis    Hiatus hernia syndrome several years ago, but not sure what Hiatal means    High cholesterol About 1 year ago    Hypertension     Pacemaker     Peripheral vascular disease, unspecified (HCC)     Stroke (HCC) 1980    Tonsillitis     Unspecified disorder of thyroid        Past Surgical History:   Procedure Laterality Date    EYE SURGERY Left 02/27/2023    INGUINAL HERNIA REPAIR  09/05/2012    Performed by RIGOBERTO MCINTYRE at SURGERY [facility]    INGUINAL HERNIA  REPAIR  2011    Performed by RIGOBERTO MCINTYRE at SURGERY [facility]    OTHER      L3-4-5 susion    OTHER      C1-2-3 fusion    TONSILLECTOMY      CARPAL TUNNEL RELEASE      OTHER CARDIAC SURGERY      2023    PACEMAKER INSERTION      2023       Social History     Socioeconomic History    Marital status:      Spouse name: Not on file    Number of children: Not on file    Years of education: Not on file    Highest education level: Not on file   Occupational History    Not on file   Tobacco Use    Smoking status: Former     Current packs/day: 0.00     Average packs/day: 1 pack/day for 20.0 years (20.0 ttl pk-yrs)     Types: Cigarettes     Start date: 1960     Quit date: 1980     Years since quittin.7     Passive exposure: Never    Smokeless tobacco: Never   Vaping Use    Vaping Use: Never used   Substance and Sexual Activity    Alcohol use: Yes     Alcohol/week: 4.2 - 16.8 oz     Types: 7 Standard drinks or equivalent per week     Comment: 1-2 drinks a day    Drug use: Never     Types: Marijuana, Oral     Comment: has not used 60 years ago     Sexual activity: Not on file   Other Topics Concern    Not on file   Social History Narrative    Not on file     Social Determinants of Health     Financial Resource Strain: Not on file   Food Insecurity: Not on file   Transportation Needs: Not on file   Physical Activity: Not on file   Stress: Not on file   Social Connections: Not on file   Intimate Partner Violence: Not on file   Housing Stability: Not on file       Family History   Problem Relation Age of Onset    Cancer Other     Diabetes Mother     No Known Problems Father     Lung Disease Sister     Cancer Brother        Allergies   Allergen Reactions    Amiodarone      Caused detached retina     Tape Unspecified     Blisters and scarring.  Paper tape    Flecainide      Didn't work per patient       Current Outpatient Medications   Medication Sig Dispense Refill    sotalol  (BETAPACE) 80 MG Tab Take 1 Tablet by mouth 2 times a day. 180 Tablet 3    ELIQUIS 5 MG Tab TAKE 1 TABLET TWICE A  Tablet 1    rosuvastatin (CRESTOR) 10 MG Tab TAKE 1 TABLET EVERY EVENING 90 Tablet 1    budesonide-formoterol (SYMBICORT) 160-4.5 MCG/ACT Aerosol Inhale 2 Puffs 2 times a day. Use with spacer.  Rinse mouth after each use. 3 Each 3    tamsulosin (FLOMAX) 0.4 MG capsule Take 0.4 mg by mouth every evening.      losartan-hydrochlorothiazide (HYZAAR) 50-12.5 MG per tablet Take 1 Tablet by mouth every day. 90 Tablet 7    Omega-3 Fatty Acids (FISH OIL PO) Take 1 Capsule by mouth every day.      Ferrous Sulfate (IRON) 325 (65 Fe) MG Tab Take 325 mg by mouth every day.      levothyroxine (SYNTHROID) 88 MCG Tab Take 88 mcg by mouth Every morning on an empty stomach.      Glucosamine-Chondroit-Vit C-Mn (GLUCOSAMINE 1500 COMPLEX) Cap Take 1 Capsule by mouth every day.       No current facility-administered medications for this visit.       Vitals:    09/13/23 1323   BP: 106/62   Pulse: 66   Resp: 16   SpO2: 96%         Review of Systems   Constitutional: Negative.  Negative for malaise/fatigue.   HENT: Negative.     Eyes: Negative.    Respiratory:  Positive for cough. Negative for shortness of breath and wheezing.    Cardiovascular:  Negative for chest pain, palpitations, orthopnea, claudication, leg swelling and PND.   Gastrointestinal: Negative.  Negative for nausea.   Genitourinary: Negative.    Musculoskeletal: Negative.    Skin: Negative.    Neurological: Negative.  Negative for dizziness and sensory change.   Endo/Heme/Allergies: Negative.  Does not bruise/bleed easily.   Psychiatric/Behavioral:  Negative for depression and hallucinations. The patient is not nervous/anxious.           EKG interpreted by me: A sensed V paced    Physical Exam  Constitutional:       Appearance: Normal appearance.   HENT:      Head: Normocephalic.   Eyes:      Pupils: Pupils are equal, round, and reactive to light.   Neck:       Vascular: No JVD.   Cardiovascular:      Rate and Rhythm: Normal rate and regular rhythm.      Pulses: Normal pulses.      Heart sounds: Normal heart sounds.   Pulmonary:      Effort: Pulmonary effort is normal.      Breath sounds: Normal breath sounds.   Abdominal:      General: Abdomen is flat.      Palpations: Abdomen is soft.   Musculoskeletal:      Cervical back: Normal range of motion.      Right lower leg: No edema.      Left lower leg: No edema.   Skin:     General: Skin is warm and dry.   Neurological:      Mental Status: He is alert and oriented to person, place, and time.   Psychiatric:         Mood and Affect: Mood normal.         Behavior: Behavior normal.          Data:  Lipids:   Lab Results   Component Value Date/Time    CHOLSTRLTOT 143 05/12/2023 09:03 AM    TRIGLYCERIDE 45 05/12/2023 09:03 AM    HDL 85.0 (H) 05/12/2023 09:03 AM    LDL 49 05/12/2023 09:03 AM        BMP:  Lab Results   Component Value Date/Time    SODIUM 140 09/06/2023 0720    POTASSIUM 4.1 09/06/2023 0720    CHLORIDE 107 09/06/2023 0720    CO2 25 09/06/2023 0720    GLUCOSE 93 09/06/2023 0720    BUN 21 09/06/2023 0720    CREATININE 1.29 09/06/2023 0720    CALCIUM 8.5 09/06/2023 0720    ANION 8.0 09/06/2023 0720       GFR:  Lab Results   Component Value Date/Time    IFAFRICA 55 (A) 05/03/2021 0719    IFNOTAFR 46 (A) 05/03/2021 0719        TSH:   Lab Results   Component Value Date/Time    TSHULTRASEN 3.500 08/24/2023 0720       MAGNESIUM:  Lab Results   Component Value Date/Time    MAGNESIUM 2.0 08/25/2023 0019    MAGNESIUM 2.0 08/24/2023 1658    MAGNESIUM 2.2 08/24/2023 0720        THYROXINE (T4):   Lab Results   Component Value Date/Time    FREEDIR 1.52 07/07/2015 0820        CBC:   Lab Results   Component Value Date/Time    WBC 6.9 09/06/2023 07:20 AM    RBC 3.90 (L) 09/06/2023 07:20 AM    HEMOGLOBIN 12.3 (L) 09/06/2023 07:20 AM    HEMATOCRIT 36.6 (L) 09/06/2023 07:20 AM    MCV 93.8 09/06/2023 07:20 AM    MCH 31.5 09/06/2023  07:20 AM    MCHC 33.6 09/06/2023 07:20 AM    RDW 45.2 09/06/2023 07:20 AM    PLATELETCT 262 09/06/2023 07:20 AM    MPV 9.5 09/06/2023 07:20 AM    NEUTSPOLYS 61.70 08/24/2023 07:20 AM    LYMPHOCYTES 24.90 08/24/2023 07:20 AM    MONOCYTES 7.20 08/24/2023 07:20 AM    EOSINOPHILS 5.00 08/24/2023 07:20 AM    BASOPHILS 0.60 08/24/2023 07:20 AM    IMMGRAN 0.60 08/24/2023 07:20 AM    IMMGRAN 0 12/23/2016 08:24 AM    NRBC 0.00 08/24/2023 07:20 AM    NEUTS 3.93 08/24/2023 07:20 AM    NEUTS 2.5 12/23/2016 08:24 AM    LYMPHS 1.59 08/24/2023 07:20 AM    LYMPHS 1.2 12/23/2016 08:24 AM    MONOS 0.46 08/24/2023 07:20 AM    MONOS 0.3 12/23/2016 08:24 AM    EOS 0.32 08/24/2023 07:20 AM    EOS 0.2 12/23/2016 08:24 AM    BASO 0.04 08/24/2023 07:20 AM    BASO 0.0 12/23/2016 08:24 AM    IMMGRANAB 0.04 08/24/2023 07:20 AM    IMMGRANAB 0.0 12/23/2016 08:24 AM    NRBCAB 0.00 08/24/2023 07:20 AM        CBC w/o DIFF  Lab Results   Component Value Date/Time    WBC 6.9 09/06/2023 07:20 AM    RBC 3.90 (L) 09/06/2023 07:20 AM    HEMOGLOBIN 12.3 (L) 09/06/2023 07:20 AM    MCV 93.8 09/06/2023 07:20 AM    MCH 31.5 09/06/2023 07:20 AM    MCHC 33.6 09/06/2023 07:20 AM    RDW 45.2 09/06/2023 07:20 AM    MPV 9.5 09/06/2023 07:20 AM       LIVER:  Lab Results   Component Value Date/Time    ALKPHOSPHAT 54 08/25/2023 12:19 AM    ASTSGOT 37 08/25/2023 12:19 AM    ALTSGPT 22 08/25/2023 12:19 AM    TBILIRUBIN 0.9 08/25/2023 12:19 AM       BNP:  Lab Results   Component Value Date/Time    BNPBTYPENAT 52 01/11/2018 11:54 AM       PT/INR:  Lab Results   Component Value Date/Time    PROTHROMBTM 16.6 (H) 09/06/2023 07:20 AM    PROTHROMBTM 18.1 (H) 08/24/2023 07:20 AM    PROTHROMBTM 17.6 (H) 04/21/2023 10:00 AM    INR 1.32 (H) 09/06/2023 07:20 AM    INR 1.48 (H) 08/24/2023 07:20 AM    INR 1.47 (H) 04/21/2023 10:00 AM             Impression/Plan:  1. Atrial flutter, unspecified type (HCC)  EKG      2. S/P ablation of atrial fibrillation        3. Cardiac pacemaker in  situ        4. Chronic anticoagulation        5. Encounter for monitoring sotalol therapy         - patient converted to sinus rhythm just before cardioversion   - continue sotalol 80mg twice daily for rhythm/rate control   - QTc 458 calculated with Bazettes   - can use cough drops for cough and it is improving   - continue Eliquis 5mg twice daily for stroke protection   - The patients [id number] score is 3. HAS-BLED score is 2   - remote monitoring    - annual in office device check   - will move his device check to same day as appt with Dr. Santa   - blood pressure well controlled today, continue current regimen      - Lifestyle Modification for better heart health care as well as beneficial for prevention of worsening the atrial arrhythmia progression. Lifestyle modification discussed includes diet and reduction of sodium. Patients should eat more of a Mediterranean diet and be very careful with how much processed and fast food they eat. Excessive sodium intake can result in fluid retention which can add additional strain to patients cardiac electrical system. Weight loss can also help long term with cardiac health. For patients with BMI above 25kg/m2, studies have shown a greater chance of progression of disease as well as recurrence after interventions. ARREST-AF study showed less recurrence of AF and slower disease progression in patients with BMI below 27kg/m2. Smoking and vaping should be stopped. Moderation on caffeine and alcohol. Excessive alcohol or caffeine can result in reactions and antagonize the hearts electrical system. Patient that fit the matrix for sleep apnea should be referred for a formal study and should try to be compliant with treatment for sleep apnea. Stay hydrated and stay active. Studies have shown that staying physically active can help heart health. The CARDIO-FIT study showed sedentary individuals lead to faster time of recurrence of arrhythmia. Exercise goals should be trying to  maintain 120-200 minutes per week of exercise.      A total of 33 minutes of time was spent on day of encounter reviewing medical record, performing history and examination, counseling, ordering medication/test/consults, collaborating with referring service, and documentation.    [facility]  Cardiac Electrophysiology

## 2023-10-18 ENCOUNTER — NON-PROVIDER VISIT (OUTPATIENT)
Dept: SLEEP MEDICINE | Facility: MEDICAL CENTER | Age: 78
End: 2023-10-18
Attending: INTERNAL MEDICINE
Payer: MEDICARE

## 2023-10-18 VITALS — BODY MASS INDEX: 23.74 KG/M2 | HEIGHT: 74 IN | WEIGHT: 185 LBS

## 2023-10-18 DIAGNOSIS — J45.30 MILD PERSISTENT ASTHMA WITHOUT COMPLICATION: ICD-10-CM

## 2023-10-18 PROCEDURE — 94060 EVALUATION OF WHEEZING: CPT | Performed by: INTERNAL MEDICINE

## 2023-10-18 PROCEDURE — 94726 PLETHYSMOGRAPHY LUNG VOLUMES: CPT | Mod: 26 | Performed by: INTERNAL MEDICINE

## 2023-10-18 PROCEDURE — 94729 DIFFUSING CAPACITY: CPT | Performed by: INTERNAL MEDICINE

## 2023-10-18 PROCEDURE — 94726 PLETHYSMOGRAPHY LUNG VOLUMES: CPT | Performed by: INTERNAL MEDICINE

## 2023-10-18 PROCEDURE — 94729 DIFFUSING CAPACITY: CPT | Mod: 26 | Performed by: INTERNAL MEDICINE

## 2023-10-18 PROCEDURE — 94060 EVALUATION OF WHEEZING: CPT | Mod: 26 | Performed by: INTERNAL MEDICINE

## 2023-10-18 ASSESSMENT — PULMONARY FUNCTION TESTS
FEV1/FVC_PERCENT_CHANGE: 2
FEV1/FVC_PERCENT_PREDICTED: 73
FVC_LLN: 3.80
FVC_PERCENT_PREDICTED: 93
FVC_PERCENT_PREDICTED: 92
FEV1/FVC_PERCENT_PREDICTED: 98
FVC_PREDICTED: 4.55
FEV1_PERCENT_CHANGE: 0
FVC: 4.21
FEV1/FVC_PERCENT_CHANGE: 0
FEV1/FVC_PERCENT_PREDICTED: 97
FEV1_PERCENT_PREDICTED: 89
FEV1_PERCENT_PREDICTED: 90
FEV1/FVC: 72
FEV1/FVC: 72.21
FVC: 4.27
FEV1/FVC_PERCENT_LLN: 62
FEV1_PERCENT_CHANGE: -1
FEV1: 3.04
FEV1/FVC_PERCENT_PREDICTED: 94
FEV1/FVC_PREDICTED: 74
FEV1/FVC: 71
FEV1: 3.01
FEV1_PREDICTED: 3.34
FEV1/FVC_PERCENT_PREDICTED: 96
FEV1_LLN: 2.79
FEV1/FVC: 70

## 2023-10-18 ASSESSMENT — FIBROSIS 4 INDEX: FIB4 SCORE: 2.32

## 2023-10-18 NOTE — PROCEDURES
Technician: Tamika Sanchez RRT, CPFT  Good patient effort & cooperation.  The results of this test meet the ATS/ERS standards for acceptability & reproducibility.  Test was performed on the [employer] Body Plethysmograph-Elite DX system.  Predicted equations for Spirometry are GLI-2012, ITS for lung volumes, and GLI-2017 for DLCO.  The DLCO was uncorrected for Hgb.  A bronchodilator of Ventolin HFA -2puffs via spacer administered.  DLCO performed during dilation period. IVC is less than 90%.    Interpretation;    Baseline spirometry shows normal airflows.  No significant bronchodilator response.  Total lung capacity is at the upper limits of normal at 119% predicted.  There is air trapping with residual volume of 171% predicted.  Diffusion capacity is mildly reduced at 76% predicted.  Pulmonary function testing suggests obstruction based on air trapping.  There is also reduction in DLCO which is not consistent with asthma.  Correlate clinically.

## 2023-11-28 ENCOUNTER — OFFICE VISIT (OUTPATIENT)
Dept: CARDIOLOGY | Facility: MEDICAL CENTER | Age: 78
End: 2023-11-28
Attending: INTERNAL MEDICINE
Payer: MEDICARE

## 2023-11-28 VITALS
BODY MASS INDEX: 24.38 KG/M2 | RESPIRATION RATE: 14 BRPM | HEART RATE: 60 BPM | HEIGHT: 74 IN | DIASTOLIC BLOOD PRESSURE: 70 MMHG | SYSTOLIC BLOOD PRESSURE: 116 MMHG | WEIGHT: 190 LBS | OXYGEN SATURATION: 98 %

## 2023-11-28 DIAGNOSIS — Z86.79 S/P ABLATION OF ATRIAL FIBRILLATION: ICD-10-CM

## 2023-11-28 DIAGNOSIS — Z79.01 CHRONIC ANTICOAGULATION: ICD-10-CM

## 2023-11-28 DIAGNOSIS — I10 ESSENTIAL HYPERTENSION: ICD-10-CM

## 2023-11-28 DIAGNOSIS — Z95.0 CARDIAC PACEMAKER IN SITU: ICD-10-CM

## 2023-11-28 DIAGNOSIS — Z98.890 S/P ABLATION OF ATRIAL FIBRILLATION: ICD-10-CM

## 2023-11-28 PROCEDURE — 3078F DIAST BP <80 MM HG: CPT | Performed by: INTERNAL MEDICINE

## 2023-11-28 PROCEDURE — 99214 OFFICE O/P EST MOD 30 MIN: CPT | Performed by: INTERNAL MEDICINE

## 2023-11-28 PROCEDURE — 99213 OFFICE O/P EST LOW 20 MIN: CPT | Performed by: INTERNAL MEDICINE

## 2023-11-28 PROCEDURE — 3074F SYST BP LT 130 MM HG: CPT | Performed by: INTERNAL MEDICINE

## 2023-11-28 PROCEDURE — 93280 PM DEVICE PROGR EVAL DUAL: CPT | Performed by: INTERNAL MEDICINE

## 2023-11-28 ASSESSMENT — FIBROSIS 4 INDEX: FIB4 SCORE: 2.32

## 2023-11-29 NOTE — PROGRESS NOTES
Chief Complaint   Patient presents with    Atrial Flutter       Subjective     Fabio John Kruger is a 78 y.o. male who presents today with history of persistent atrial fibrillation status post ablation.  Maintaining sinus rhythm on low-dose sotalol.  May need eye surgery.  On Eliquis.  Permanent pacemaker normal functioning.  Long first-degree AV block.  Feels improved.    Past Medical History:   Diagnosis Date    Arrhythmia     ASTHMA     Atrial fibrillation (HCC)     Back pain     Blood clotting disorder (HCC) I take Eliquis blood thinner    Breath shortness For several years    Chickenpox     Detached retina, left 03/02/2023    Upper [place] measles     Heart murmur If related to afib    Heart valve disease Afib diagnosed several years ago    Hemorrhagic disorder (HCC)     On Eliquis    Hiatus hernia syndrome several years ago, but not sure what Hiatal means    High cholesterol About 1 year ago    Hypertension     Pacemaker     Peripheral vascular disease, unspecified (HCC)     Stroke (HCC) 1980    Tonsillitis     Unspecified disorder of thyroid      Past Surgical History:   Procedure Laterality Date    EYE SURGERY Left 02/27/2023    INGUINAL HERNIA REPAIR  09/05/2012    Performed by RIGOBERTO MCINTYRE at SURGERY [facility]    INGUINAL HERNIA REPAIR  11/03/2011    Performed by RIGOBERTO MCINTYRE at SURGERY [facility]    OTHER  2009    L3-4-5 susion    OTHER  1980    C1-2-3 fusion    TONSILLECTOMY  1952    CARPAL TUNNEL RELEASE      OTHER CARDIAC SURGERY      8/2023    PACEMAKER INSERTION      4/2023     Family History   Problem Relation Age of Onset    Cancer Other     Diabetes Mother     No Known Problems Father     Lung Disease Sister     Cancer Brother      Social History     Socioeconomic History    Marital status:      Spouse name: Not on file    Number of children: Not on file    Years of education: Not on file    Highest education level: Not on file   Occupational History    Not on file   Tobacco Use     Smoking status: Former     Current packs/day: 0.00     Average packs/day: 1 pack/day for 20.0 years (20.0 ttl pk-yrs)     Types: Cigarettes     Start date: 1960     Quit date: 1980     Years since quittin.9     Passive exposure: Never    Smokeless tobacco: Never   Vaping Use    Vaping Use: Never used   Substance and Sexual Activity    Alcohol use: Yes     Alcohol/week: 4.2 - 16.8 oz     Types: 7 Standard drinks or equivalent per week     Comment: 1-2 drinks a day    Drug use: Never     Types: Marijuana, Oral     Comment: has not used 60 years ago     Sexual activity: Not on file   Other Topics Concern    Not on file   Social History Narrative    Not on file     Social Determinants of Health     Financial Resource Strain: Not on file   Food Insecurity: Not on file   Transportation Needs: Not on file   Physical Activity: Not on file   Stress: Not on file   Social Connections: Not on file   Intimate Partner Violence: Not on file   Housing Stability: Not on file     Allergies   Allergen Reactions    Amiodarone      Caused detached retina     Tape Unspecified     Blisters and scarring.  Paper tape    Flecainide      Didn't work per patient     Outpatient Encounter Medications as of 2023   Medication Sig Dispense Refill    ELIQUIS 5 MG Tab TAKE 1 TABLET TWICE A  Tablet 3    rosuvastatin (CRESTOR) 10 MG Tab TAKE 1 TABLET EVERY EVENING 90 Tablet 3    sotalol (BETAPACE) 80 MG Tab Take 1 Tablet by mouth 2 times a day. 180 Tablet 3    budesonide-formoterol (SYMBICORT) 160-4.5 MCG/ACT Aerosol Inhale 2 Puffs 2 times a day. Use with spacer.  Rinse mouth after each use. 3 Each 3    tamsulosin (FLOMAX) 0.4 MG capsule Take 0.4 mg by mouth every evening.      losartan-hydrochlorothiazide (HYZAAR) 50-12.5 MG per tablet Take 1 Tablet by mouth every day. 90 Tablet 7    Omega-3 Fatty Acids (FISH OIL PO) Take 1 Capsule by mouth every day.      Ferrous Sulfate (IRON) 325 (65 Fe) MG Tab Take 325 mg by mouth  "every day.      levothyroxine (SYNTHROID) 88 MCG Tab Take 88 mcg by mouth Every morning on an empty stomach.      Glucosamine-Chondroit-Vit C-Mn (GLUCOSAMINE 1500 COMPLEX) Cap Take 1 Capsule by mouth every day.       No facility-administered encounter medications on file as of 11/28/2023.     ROS           Objective     /70 (BP Location: Left arm, Patient Position: Sitting, BP Cuff Size: Adult)   Pulse 60   Resp 14   Ht 1.88 m (6' 2\")   Wt 86.2 kg (190 lb)   SpO2 98%   BMI 24.39 kg/m²     Physical Exam  Constitutional:       Appearance: He is well-developed.   HENT:      Head: Normocephalic and atraumatic.   Cardiovascular:      Rate and Rhythm: Normal rate and regular rhythm.      Heart sounds: No murmur heard.     No friction rub. No gallop.   Pulmonary:      Effort: Pulmonary effort is normal.      Breath sounds: Normal breath sounds.   Abdominal:      Palpations: Abdomen is soft.   Musculoskeletal:         General: Normal range of motion.      Cervical back: Normal range of motion and neck supple.   Skin:     General: Skin is warm and dry.   Neurological:      Mental Status: He is alert and oriented to person, place, and time.   Psychiatric:         Behavior: Behavior normal.         Thought Content: Thought content normal.         Judgment: Judgment normal.                Assessment & Plan     1. S/P ablation of atrial fibrillation        2. Cardiac pacemaker in situ        3. Essential hypertension        4. Chronic anticoagulation            Medical Decision Making: Today's Assessment/Status/Plan:   1.  Atrial fibrillation persistent status post ablation continue low-dose sotalol.  2.  Anticoagulation continue Eliquis.  3.  Permanent pacemaker normal function.  4.  Follow-up me in 3 months.                     "

## 2023-12-07 ENCOUNTER — NON-PROVIDER VISIT (OUTPATIENT)
Dept: CARDIOLOGY | Facility: MEDICAL CENTER | Age: 78
End: 2023-12-07
Payer: MEDICARE

## 2023-12-07 PROCEDURE — 93294 REM INTERROG EVL PM/LDLS PM: CPT | Performed by: INTERNAL MEDICINE

## 2023-12-07 PROCEDURE — 93280 PM DEVICE PROGR EVAL DUAL: CPT | Mod: 26 | Performed by: INTERNAL MEDICINE

## 2023-12-07 NOTE — CARDIAC REMOTE MONITOR - SCAN
Device transmission reviewed. Device demonstrated appropriate function.       Electronically Signed by: Vipul Lew M.D.    12/8/2023  10:33 AM

## 2024-01-16 ENCOUNTER — OFFICE VISIT (OUTPATIENT)
Dept: SLEEP MEDICINE | Facility: MEDICAL CENTER | Age: 79
End: 2024-01-16
Attending: INTERNAL MEDICINE
Payer: MEDICARE

## 2024-01-16 VITALS
BODY MASS INDEX: 23.74 KG/M2 | WEIGHT: 185 LBS | OXYGEN SATURATION: 97 % | HEIGHT: 74 IN | DIASTOLIC BLOOD PRESSURE: 56 MMHG | SYSTOLIC BLOOD PRESSURE: 114 MMHG | HEART RATE: 62 BPM

## 2024-01-16 DIAGNOSIS — R94.2 DIFFUSION CAPACITY OF LUNG (DL), DECREASED: ICD-10-CM

## 2024-01-16 DIAGNOSIS — J45.30 MILD PERSISTENT ASTHMA WITHOUT COMPLICATION: ICD-10-CM

## 2024-01-16 DIAGNOSIS — R05.1 ACUTE COUGH: ICD-10-CM

## 2024-01-16 DIAGNOSIS — I48.91 ATRIAL FIBRILLATION, UNSPECIFIED TYPE (HCC): ICD-10-CM

## 2024-01-16 PROCEDURE — 99214 OFFICE O/P EST MOD 30 MIN: CPT | Performed by: INTERNAL MEDICINE

## 2024-01-16 PROCEDURE — 99212 OFFICE O/P EST SF 10 MIN: CPT | Performed by: INTERNAL MEDICINE

## 2024-01-16 PROCEDURE — 3078F DIAST BP <80 MM HG: CPT | Performed by: INTERNAL MEDICINE

## 2024-01-16 PROCEDURE — 3074F SYST BP LT 130 MM HG: CPT | Performed by: INTERNAL MEDICINE

## 2024-01-16 RX ORDER — BUDESONIDE AND FORMOTEROL FUMARATE DIHYDRATE 160; 4.5 UG/1; UG/1
2 AEROSOL RESPIRATORY (INHALATION) 2 TIMES DAILY
Qty: 3 EACH | Refills: 3 | Status: SHIPPED | OUTPATIENT
Start: 2024-01-16

## 2024-01-16 RX ORDER — BENZONATATE 100 MG/1
100 CAPSULE ORAL 3 TIMES DAILY PRN
Qty: 60 CAPSULE | Refills: 1 | Status: SHIPPED | OUTPATIENT
Start: 2024-01-16

## 2024-01-16 ASSESSMENT — ENCOUNTER SYMPTOMS
FEVER: 0
WEIGHT LOSS: 0
SPUTUM PRODUCTION: 0
NECK PAIN: 0
HEADACHES: 0
DOUBLE VISION: 0
COUGH: 1
HEMOPTYSIS: 0
ORTHOPNEA: 0
PALPITATIONS: 0
PHOTOPHOBIA: 0
PND: 0
CONSTIPATION: 0
SPEECH CHANGE: 0
DIARRHEA: 0
DIZZINESS: 0
NAUSEA: 0
SORE THROAT: 0
SHORTNESS OF BREATH: 0
MYALGIAS: 0
BACK PAIN: 0
BLURRED VISION: 0
TREMORS: 0
EYE PAIN: 0
HEARTBURN: 0
DEPRESSION: 0
WEAKNESS: 0
VOMITING: 0
EYE DISCHARGE: 0
WHEEZING: 0
CLAUDICATION: 0
FOCAL WEAKNESS: 0
FALLS: 0
ABDOMINAL PAIN: 0
SINUS PAIN: 0
EYE REDNESS: 0
STRIDOR: 0
CHILLS: 0
DIAPHORESIS: 0

## 2024-01-16 ASSESSMENT — FIBROSIS 4 INDEX: FIB4 SCORE: 2.32

## 2024-01-16 ASSESSMENT — PATIENT HEALTH QUESTIONNAIRE - PHQ9: CLINICAL INTERPRETATION OF PHQ2 SCORE: 0

## 2024-01-16 NOTE — PATIENT INSTRUCTIONS
Cough medicine sent to [facility]  Symbicort; nadja to use 2 puffs twice daily or at least before bed

## 2024-01-16 NOTE — PROGRESS NOTES
Chief Complaint   Patient presents with    Follow-Up     LAST SEEN 6/14/23    Results     PFT 10/18/23  CTA CHEST 6/18/23  CXR 6/18/23         HPI: This patient is a 78 y.o. male whom is followed in our clinic for GAY and mild asthma last seen by me on 6/14/23. He is followed by cardiology for AF s/p recent attempt at cardioversion which was not successful now newly on flecainide and metoprolol. He is also s/p recent pacemaker placement. Asthma is long-standing and sxs include wheezing and cough both of which were controlled on low-dose ISC/LABA with symbicort 80 w/o rescue inhaler use or tx for acute exacerbation. PFTs  in 2020 were essentially normal. The pt did have 5 mm LLL nodule on CT in 2019 for which we did a repeat CT in March 2022 and showed stable LLL nodule, stable RLL 6 mm nodule not commented on in 2019 but present when reviewed by me. Mild scarring in RLL also stable.  PFT in August 2022 showed normal airflows with normal lung volumes however there is air trapping and preserved diffusion capacity at 91% predicted.  No bronchodilator response. He continued to have GAY and tried to complete treadmill stress test which was submaximal due to having to stop for GAY. HR increased to 93 and SpO2 was 93-94%. NO chest pain or ischemic changes on ECG. We empircally increased symbicort to 160 was helpful at first however GAY continued. subsequent CPET  showed blunted HR response and low BP with exercise. Since our last visit he underwent ablation for AF and GAY has improved significantly. He is not back to his original level of exercise but significantly improved. Today he presents for routine f/u with acute cough and chest congestion for the past 4 days. Sputum is white-yellow. No CP or sinus pressure but he does have HA from severe cough. No fever. No increased need for EZNA which he has not used in 6 mos. He is up to date on all vaccinations.  PFT from October showed normal air flows, normal TLC with some air  trapping and DLCO fo 76% predicted.     Past Medical History:   Diagnosis Date    Arrhythmia     ASTHMA     Atrial fibrillation (HCC)     Back pain     Blood clotting disorder (HCC) I take Eliquis blood thinner    Breath shortness For several years    Chickenpox     Detached retina, left 2023    [place] measles     Heart murmur If related to afib    Heart valve disease Afib diagnosed several years ago    Hemorrhagic disorder (HCC)     On Eliquis    Hiatus hernia syndrome several years ago, but not sure what Hiatal means    High cholesterol About 1 year ago    Hypertension     Pacemaker     Peripheral vascular disease, unspecified (HCC)     Stroke ([facility])     Tonsillitis     Unspecified disorder of thyroid        Social History     Socioeconomic History    Marital status:      Spouse name: Not on file    Number of children: Not on file    Years of education: Not on file    Highest education level: Not on file   Occupational History    Not on file   Tobacco Use    Smoking status: Former     Current packs/day: 0.00     Average packs/day: 1 pack/day for 20.0 years (20.0 ttl pk-yrs)     Types: Cigarettes     Start date: 1960     Quit date: 1980     Years since quittin.0     Passive exposure: Never    Smokeless tobacco: Never   Vaping Use    Vaping Use: Never used   Substance and Sexual Activity    Alcohol use: Yes     Alcohol/week: 4.2 - 16.8 oz     Types: 7 Standard drinks or equivalent per week     Comment: 1-2 drinks a day    Drug use: Never     Types: Marijuana, Oral     Comment: has not used 60 years ago     Sexual activity: Not on file   Other Topics Concern    Not on file   Social History Narrative    Not on file     Social Determinants of Health     Financial Resource Strain: Not on file   Food Insecurity: Not on file   Transportation Needs: Not on file   Physical Activity: Not on file   Stress: Not on file   Social Connections: Not on file   Intimate Partner Violence: Not on file    Housing Stability: Not on file       Family History   Problem Relation Age of Onset    Cancer Other     Diabetes Mother     No Known Problems Father     Lung Disease Sister     Cancer Brother        Current Outpatient Medications on File Prior to Visit   Medication Sig Dispense Refill    ELIQUIS 5 MG Tab TAKE 1 TABLET TWICE A  Tablet 3    rosuvastatin (CRESTOR) 10 MG Tab TAKE 1 TABLET EVERY EVENING 90 Tablet 3    sotalol (BETAPACE) 80 MG Tab Take 1 Tablet by mouth 2 times a day. 180 Tablet 3    tamsulosin (FLOMAX) 0.4 MG capsule Take 0.4 mg by mouth every evening.      losartan-hydrochlorothiazide (HYZAAR) 50-12.5 MG per tablet Take 1 Tablet by mouth every day. 90 Tablet 7    Omega-3 Fatty Acids (FISH OIL PO) Take 1 Capsule by mouth every day.      Ferrous Sulfate (IRON) 325 (65 Fe) MG Tab Take 325 mg by mouth every day.      levothyroxine (SYNTHROID) 88 MCG Tab Take 88 mcg by mouth Every morning on an empty stomach.      Glucosamine-Chondroit-Vit C-Mn (GLUCOSAMINE 1500 COMPLEX) Cap Take 1 Capsule by mouth every day.       No current facility-administered medications on file prior to visit.       Amiodarone, Tape, and Flecainide      ROS:   Review of Systems   Constitutional:  Negative for chills, diaphoresis, fever, malaise/fatigue and weight loss.   HENT:  Negative for congestion, ear discharge, ear pain, hearing loss, nosebleeds, sinus pain, sore throat and tinnitus.    Eyes:  Negative for blurred vision, double vision, photophobia, pain, discharge and redness.   Respiratory:  Positive for cough. Negative for hemoptysis, sputum production, shortness of breath, wheezing and stridor.         Congestion   Cardiovascular:  Negative for chest pain, palpitations, orthopnea, claudication, leg swelling and PND.   Gastrointestinal:  Negative for abdominal pain, constipation, diarrhea, heartburn, nausea and vomiting.   Genitourinary:  Negative for dysuria and urgency.   Musculoskeletal:  Negative for back pain,  "falls, joint pain, myalgias and neck pain.   Skin:  Negative for itching and rash.   Neurological:  Negative for dizziness, tremors, speech change, focal weakness, weakness and headaches.   Endo/Heme/Allergies:  Negative for environmental allergies.   Psychiatric/Behavioral:  Negative for depression.        /56 (BP Location: Right arm, Patient Position: Sitting, BP Cuff Size: Adult)   Pulse 62   Ht 1.88 m (6' 2\")   Wt 83.9 kg (185 lb)   SpO2 97%   Physical Exam  Vitals reviewed.   Constitutional:       General: He is not in acute distress.     Appearance: Normal appearance. He is normal weight.   HENT:      Head: Normocephalic and atraumatic.      Right Ear: External ear normal.      Left Ear: External ear normal.      Nose: Nose normal. No congestion.      Mouth/Throat:      Mouth: Mucous membranes are moist.      Pharynx: Oropharynx is clear. No oropharyngeal exudate.   Eyes:      General: No scleral icterus.     Extraocular Movements: Extraocular movements intact.      Conjunctiva/sclera: Conjunctivae normal.      Pupils: Pupils are equal, round, and reactive to light.   Cardiovascular:      Rate and Rhythm: Normal rate and regular rhythm.      Heart sounds: Normal heart sounds. No murmur heard.     No gallop.   Pulmonary:      Effort: Pulmonary effort is normal. No respiratory distress.      Breath sounds: Normal breath sounds. No wheezing or rales.   Abdominal:      General: There is no distension.      Palpations: Abdomen is soft.   Musculoskeletal:         General: Normal range of motion.      Cervical back: Normal range of motion and neck supple.      Right lower leg: No edema.      Left lower leg: No edema.   Skin:     General: Skin is warm and dry.      Findings: No rash.   Neurological:      Mental Status: He is alert and oriented to person, place, and time.      Cranial Nerves: No cranial nerve deficit.   Psychiatric:         Mood and Affect: Mood normal.         Behavior: Behavior normal. "         PFTs as reviewed by me personally: as per HPI    Imaging as reviewed by me personally:  as per hPI    Assessment:  1. Acute cough  benzonatate (TESSALON PERLES) 100 MG Cap      2. Mild persistent asthma without complication  budesonide-formoterol (SYMBICORT) 160-4.5 MCG/ACT Aerosol    Diffusion Capacity (DLCO)      3. Atrial fibrillation, unspecified type (HCC)  Diffusion Capacity (DLCO)      4. Diffusion capacity of lung (dl), decreased            Plan:  Likely acute viral infx. He was given cough suppressant and instructed to use symbicort 2 puff BID and contact me if sxs no improving.  Chronic, mild and well controlled on low dose ICS but will increase ICS dose for acute URI  S/p ablation with improvement in GAY. RRR on exam in clinic today.   New but suspect this could be temporary after his AF ablation; repeat DLCO in 4-6 mos at f/u with me  Return in about 4 months (around 5/16/2024) for PFT, DLCO specifically at f/u.

## 2024-01-25 ENCOUNTER — TELEPHONE (OUTPATIENT)
Dept: SLEEP MEDICINE | Facility: MEDICAL CENTER | Age: 79
End: 2024-01-25
Payer: MEDICARE

## 2024-01-25 NOTE — TELEPHONE ENCOUNTER
Caller: Giovany Kruger      Topic/issue: Patient was calling about his DLCO testing that was ordered for him by Dominguez and seeing about having it done before his appointment with her in July       Callback Number: [phone]      Thank you    -Gerald LYNNE

## 2024-01-29 NOTE — TELEPHONE ENCOUNTER
Per Tamika, one our PFT techs schedule in PFT room and put in notes DLCO only. Will call pt later today to schedule.

## 2024-01-31 NOTE — TELEPHONE ENCOUNTER
Barb Mix M.D.  YouYesterday (4:37 PM)       Yes, all I wanted was a DLCO and same day is fine     Pt scheduled.

## 2024-02-28 ENCOUNTER — OFFICE VISIT (OUTPATIENT)
Dept: CARDIOLOGY | Facility: MEDICAL CENTER | Age: 79
End: 2024-02-28
Attending: INTERNAL MEDICINE
Payer: MEDICARE

## 2024-02-28 VITALS
BODY MASS INDEX: 23.87 KG/M2 | OXYGEN SATURATION: 96 % | WEIGHT: 186 LBS | SYSTOLIC BLOOD PRESSURE: 112 MMHG | HEART RATE: 60 BPM | HEIGHT: 74 IN | DIASTOLIC BLOOD PRESSURE: 70 MMHG | RESPIRATION RATE: 14 BRPM

## 2024-02-28 DIAGNOSIS — Z98.890 S/P ABLATION OF ATRIAL FIBRILLATION: ICD-10-CM

## 2024-02-28 DIAGNOSIS — D68.69 HYPERCOAGULABLE STATE DUE TO PAROXYSMAL ATRIAL FIBRILLATION (HCC): ICD-10-CM

## 2024-02-28 DIAGNOSIS — Z79.899 ENCOUNTER FOR MONITORING SOTALOL THERAPY: ICD-10-CM

## 2024-02-28 DIAGNOSIS — Z79.01 CHRONIC ANTICOAGULATION: ICD-10-CM

## 2024-02-28 DIAGNOSIS — Z86.79 S/P ABLATION OF ATRIAL FIBRILLATION: ICD-10-CM

## 2024-02-28 DIAGNOSIS — Z51.81 ENCOUNTER FOR MONITORING SOTALOL THERAPY: ICD-10-CM

## 2024-02-28 DIAGNOSIS — R00.1 SYMPTOMATIC BRADYCARDIA: ICD-10-CM

## 2024-02-28 DIAGNOSIS — I48.0 HYPERCOAGULABLE STATE DUE TO PAROXYSMAL ATRIAL FIBRILLATION (HCC): ICD-10-CM

## 2024-02-28 DIAGNOSIS — Z95.0 CARDIAC PACEMAKER IN SITU: ICD-10-CM

## 2024-02-28 DIAGNOSIS — I10 ESSENTIAL HYPERTENSION: ICD-10-CM

## 2024-02-28 DIAGNOSIS — I48.19 PERSISTENT ATRIAL FIBRILLATION (HCC): ICD-10-CM

## 2024-02-28 PROCEDURE — 3074F SYST BP LT 130 MM HG: CPT | Performed by: INTERNAL MEDICINE

## 2024-02-28 PROCEDURE — 93280 PM DEVICE PROGR EVAL DUAL: CPT | Performed by: INTERNAL MEDICINE

## 2024-02-28 PROCEDURE — 99214 OFFICE O/P EST MOD 30 MIN: CPT | Performed by: INTERNAL MEDICINE

## 2024-02-28 PROCEDURE — 3078F DIAST BP <80 MM HG: CPT | Performed by: INTERNAL MEDICINE

## 2024-02-28 PROCEDURE — 93280 PM DEVICE PROGR EVAL DUAL: CPT | Mod: 26 | Performed by: INTERNAL MEDICINE

## 2024-02-28 PROCEDURE — 99213 OFFICE O/P EST LOW 20 MIN: CPT | Performed by: INTERNAL MEDICINE

## 2024-02-28 ASSESSMENT — FIBROSIS 4 INDEX: FIB4 SCORE: 2.32

## 2024-02-28 NOTE — PROGRESS NOTES
Chief Complaint   Patient presents with    Follow-Up     F/V DX: S/P ablation of atrial fibrillation       Subjective     Fabio John Kruger is a 78 y.o. male who presents today status post permanent pacemaker long first-degree AV block.  Dual-chamber pacing.  Persistent atrial fibrillation status post ablation.  On sotalol.  Maintaining sinus rhythm minor shortness of breath.  Energy improved.  On anticoagulation.    Past Medical History:   Diagnosis Date    Arrhythmia     ASTHMA     Atrial fibrillation (HCC)     Back pain     Blood clotting disorder (HCC) I take Eliquis blood thinner    Breath shortness For several years    Chickenpox     Detached retina, left 03/02/2023    [place] measles     Heart murmur If related to afib    Heart valve disease Afib diagnosed several years ago    Hemorrhagic disorder (HCC)     On Eliquis    Hiatus hernia syndrome several years ago, but not sure what Hiatal means    High cholesterol About 1 year ago    Hypertension     Pacemaker     Peripheral vascular disease, unspecified (HCC)     Stroke (HCC) 1980    Tonsillitis     Unspecified disorder of thyroid      Past Surgical History:   Procedure Laterality Date    EYE SURGERY Left 02/27/2023    INGUINAL HERNIA REPAIR  09/05/2012    Performed by RIGOBERTO MCINTYRE at SURGERY [facility]    INGUINAL HERNIA REPAIR  11/03/2011    Performed by RIGOBERTO MCINTYRE at SURGERY [facility]    OTHER  2009    L3-4-5 susion    OTHER  1980    C1-2-3 fusion    TONSILLECTOMY  1952    CARPAL TUNNEL RELEASE      OTHER CARDIAC SURGERY      8/2023    PACEMAKER INSERTION      4/2023     Family History   Problem Relation Age of Onset    Cancer Other     Diabetes Mother     No Known Problems Father     Lung Disease Sister     Cancer Brother      Social History     Socioeconomic History    Marital status:      Spouse name: Not on file    Number of children: Not on file    Years of education: Not on file    Highest education level: Not on file    Occupational History    Not on file   Tobacco Use    Smoking status: Former     Current packs/day: 0.00     Average packs/day: 1 pack/day for 20.0 years (20.0 ttl pk-yrs)     Types: Cigarettes     Start date: 1960     Quit date: 1980     Years since quittin.1     Passive exposure: Never    Smokeless tobacco: Never   Vaping Use    Vaping Use: Never used   Substance and Sexual Activity    Alcohol use: Yes     Alcohol/week: 4.2 - 16.8 oz     Types: 7 Standard drinks or equivalent per week     Comment: 1-2 drinks a day    Drug use: Never     Types: Marijuana, Oral     Comment: has not used 60 years ago     Sexual activity: Not on file   Other Topics Concern    Not on file   Social History Narrative    Not on file     Social Determinants of Health     Financial Resource Strain: Not on file   Food Insecurity: Not on file   Transportation Needs: Not on file   Physical Activity: Not on file   Stress: Not on file   Social Connections: Not on file   Intimate Partner Violence: Not on file   Housing Stability: Not on file     Allergies   Allergen Reactions    Amiodarone      Caused detached retina     Tape Unspecified     Blisters and scarring.  Paper tape    Flecainide      Didn't work per patient     Outpatient Encounter Medications as of 2024   Medication Sig Dispense Refill    benzonatate (TESSALON PERLES) 100 MG Cap Take 1 Capsule by mouth 3 times a day as needed for Cough. 60 Capsule 1    budesonide-formoterol (SYMBICORT) 160-4.5 MCG/ACT Aerosol Inhale 2 Puffs 2 times a day. Use with spacer.  Rinse mouth after each use. 3 Each 3    ELIQUIS 5 MG Tab TAKE 1 TABLET TWICE A  Tablet 3    rosuvastatin (CRESTOR) 10 MG Tab TAKE 1 TABLET EVERY EVENING 90 Tablet 3    sotalol (BETAPACE) 80 MG Tab Take 1 Tablet by mouth 2 times a day. 180 Tablet 3    tamsulosin (FLOMAX) 0.4 MG capsule Take 0.4 mg by mouth every evening.      losartan-hydrochlorothiazide (HYZAAR) 50-12.5 MG per tablet Take 1 Tablet by mouth  "every day. 90 Tablet 7    Omega-3 Fatty Acids (FISH OIL PO) Take 1 Capsule by mouth every day.      Ferrous Sulfate (IRON) 325 (65 Fe) MG Tab Take 325 mg by mouth every day.      levothyroxine (SYNTHROID) 88 MCG Tab Take 88 mcg by mouth Every morning on an empty stomach.      Glucosamine-Chondroit-Vit C-Mn (GLUCOSAMINE 1500 COMPLEX) Cap Take 1 Capsule by mouth every day.       No facility-administered encounter medications on file as of 2/28/2024.     ROS           Objective     /70 (BP Location: Left arm, Patient Position: Sitting, BP Cuff Size: Adult)   Pulse 60   Resp 14   Ht 1.88 m (6' 2\")   Wt 84.4 kg (186 lb)   SpO2 96%   BMI 23.88 kg/m²     Physical Exam           Assessment & Plan     1. Cardiac pacemaker in situ        2. Chronic anticoagulation        3. Encounter for monitoring sotalol therapy        4. Essential hypertension        5. Persistent atrial fibrillation (HCC)        6. Hypercoagulable state due to paroxysmal atrial fibrillation (HCC)        7. S/P ablation of atrial fibrillation        8. Symptomatic bradycardia  EC-ECHOCARDIOGRAM COMPLETE W/O CONT          Medical Decision Making: Today's Assessment/Status/Plan:   1.  Atrial arrhythmias continue sotalol minimal flutter seen.  2.  Anticoagulation continue DOAC.  3.  Mild shortness of breath RV pacing check echocardiogram.  4.  Hypertension continue current regimen.  5.  Hyperlipidemia on statins.  6.  Follow-up with me in 6 months.  Long-term patient wishes to be followed by Dr. Pink.                     "

## 2024-03-07 ENCOUNTER — NON-PROVIDER VISIT (OUTPATIENT)
Dept: CARDIOLOGY | Facility: MEDICAL CENTER | Age: 79
End: 2024-03-07
Payer: MEDICARE

## 2024-03-07 PROCEDURE — 93294 REM INTERROG EVL PM/LDLS PM: CPT | Performed by: INTERNAL MEDICINE

## 2024-03-07 NOTE — CARDIAC REMOTE MONITOR - SCAN
Device transmission reviewed. Device demonstrated appropriate function.       Electronically Signed by: Zoya Cerna M.D.    3/30/2024  11:56 AM

## 2024-04-26 ENCOUNTER — PATIENT MESSAGE (OUTPATIENT)
Dept: CARDIOLOGY | Facility: MEDICAL CENTER | Age: 79
End: 2024-04-26
Payer: MEDICARE

## 2024-06-06 ENCOUNTER — NON-PROVIDER VISIT (OUTPATIENT)
Dept: CARDIOLOGY | Facility: MEDICAL CENTER | Age: 79
End: 2024-06-06
Payer: MEDICARE

## 2024-06-06 PROCEDURE — 93294 REM INTERROG EVL PM/LDLS PM: CPT | Performed by: INTERNAL MEDICINE

## 2024-07-02 DIAGNOSIS — J45.30 MILD PERSISTENT ASTHMA WITHOUT COMPLICATION: ICD-10-CM

## 2024-07-02 DIAGNOSIS — I48.91 ATRIAL FIBRILLATION, UNSPECIFIED TYPE (HCC): ICD-10-CM

## 2024-07-10 ENCOUNTER — NON-PROVIDER VISIT (OUTPATIENT)
Dept: SLEEP MEDICINE | Facility: MEDICAL CENTER | Age: 79
End: 2024-07-10
Attending: INTERNAL MEDICINE
Payer: MEDICARE

## 2024-07-10 VITALS — WEIGHT: 183 LBS | BODY MASS INDEX: 23.49 KG/M2 | HEIGHT: 74 IN

## 2024-07-10 VITALS
SYSTOLIC BLOOD PRESSURE: 116 MMHG | HEIGHT: 74 IN | HEART RATE: 64 BPM | OXYGEN SATURATION: 98 % | WEIGHT: 183 LBS | DIASTOLIC BLOOD PRESSURE: 58 MMHG | BODY MASS INDEX: 23.49 KG/M2

## 2024-07-10 DIAGNOSIS — J45.30 MILD PERSISTENT ASTHMA WITHOUT COMPLICATION: ICD-10-CM

## 2024-07-10 DIAGNOSIS — R06.02 SOB (SHORTNESS OF BREATH): ICD-10-CM

## 2024-07-10 DIAGNOSIS — I48.91 ATRIAL FIBRILLATION, UNSPECIFIED TYPE (HCC): ICD-10-CM

## 2024-07-10 PROCEDURE — 3074F SYST BP LT 130 MM HG: CPT | Performed by: INTERNAL MEDICINE

## 2024-07-10 PROCEDURE — 94010 BREATHING CAPACITY TEST: CPT | Mod: 26 | Performed by: INTERNAL MEDICINE

## 2024-07-10 PROCEDURE — 99214 OFFICE O/P EST MOD 30 MIN: CPT | Performed by: INTERNAL MEDICINE

## 2024-07-10 PROCEDURE — 3078F DIAST BP <80 MM HG: CPT | Performed by: INTERNAL MEDICINE

## 2024-07-10 PROCEDURE — 99212 OFFICE O/P EST SF 10 MIN: CPT | Mod: XU | Performed by: INTERNAL MEDICINE

## 2024-07-10 PROCEDURE — 94729 DIFFUSING CAPACITY: CPT | Performed by: INTERNAL MEDICINE

## 2024-07-10 PROCEDURE — 94729 DIFFUSING CAPACITY: CPT | Mod: 26 | Performed by: INTERNAL MEDICINE

## 2024-07-10 PROCEDURE — 94761 N-INVAS EAR/PLS OXIMETRY MLT: CPT | Performed by: INTERNAL MEDICINE

## 2024-07-10 PROCEDURE — 94010 BREATHING CAPACITY TEST: CPT | Performed by: INTERNAL MEDICINE

## 2024-07-10 RX ORDER — BUDESONIDE AND FORMOTEROL FUMARATE DIHYDRATE 160; 4.5 UG/1; UG/1
2 AEROSOL RESPIRATORY (INHALATION) 2 TIMES DAILY
COMMUNITY

## 2024-07-10 ASSESSMENT — ENCOUNTER SYMPTOMS
DOUBLE VISION: 0
DIZZINESS: 0
CLAUDICATION: 0
EYE REDNESS: 0
NECK PAIN: 0
WEIGHT LOSS: 0
SHORTNESS OF BREATH: 1
PALPITATIONS: 0
BLURRED VISION: 0
FALLS: 0
WHEEZING: 0
BACK PAIN: 0
MYALGIAS: 0
NAUSEA: 0
ORTHOPNEA: 0
CHILLS: 0
EYE DISCHARGE: 0
FEVER: 0
CONSTIPATION: 0
SPEECH CHANGE: 0
HEMOPTYSIS: 0
TREMORS: 0
ABDOMINAL PAIN: 0
EYE PAIN: 0
STRIDOR: 0
DEPRESSION: 0
DIARRHEA: 0
HEADACHES: 0
SINUS PAIN: 0
DIAPHORESIS: 0
SPUTUM PRODUCTION: 0
PHOTOPHOBIA: 0
FOCAL WEAKNESS: 0
HEARTBURN: 0
SORE THROAT: 0
WEAKNESS: 0
VOMITING: 0
COUGH: 0
PND: 0

## 2024-07-10 ASSESSMENT — FIBROSIS 4 INDEX
FIB4 SCORE: 1.224744871391589049
FIB4 SCORE: 1.224744871391589049

## 2024-07-22 DIAGNOSIS — I44.0 FIRST DEGREE AV BLOCK: ICD-10-CM

## 2024-07-22 DIAGNOSIS — Z98.890 S/P ABLATION OF ATRIAL FIBRILLATION: ICD-10-CM

## 2024-07-22 DIAGNOSIS — I44.0 AV BLOCK, 1ST DEGREE: ICD-10-CM

## 2024-07-22 DIAGNOSIS — I44.30 AV BLOCK: ICD-10-CM

## 2024-07-22 DIAGNOSIS — Z86.79 S/P ABLATION OF ATRIAL FIBRILLATION: ICD-10-CM

## 2024-07-22 DIAGNOSIS — I48.19 PERSISTENT ATRIAL FIBRILLATION (HCC): ICD-10-CM

## 2024-07-22 DIAGNOSIS — I48.0 PAROXYSMAL ATRIAL FIBRILLATION (HCC): ICD-10-CM

## 2024-07-23 ENCOUNTER — PATIENT MESSAGE (OUTPATIENT)
Dept: CARDIOLOGY | Facility: MEDICAL CENTER | Age: 79
End: 2024-07-23
Payer: MEDICARE

## 2024-07-23 RX ORDER — SOTALOL HYDROCHLORIDE 80 MG/1
80 TABLET ORAL 2 TIMES DAILY
Qty: 180 TABLET | Refills: 1 | Status: SHIPPED | OUTPATIENT
Start: 2024-07-23

## 2024-07-30 ENCOUNTER — PATIENT MESSAGE (OUTPATIENT)
Dept: CARDIOLOGY | Facility: MEDICAL CENTER | Age: 79
End: 2024-07-30
Payer: MEDICARE

## 2024-07-30 DIAGNOSIS — I48.19 PERSISTENT ATRIAL FIBRILLATION (HCC): ICD-10-CM

## 2024-07-30 DIAGNOSIS — I44.0 FIRST DEGREE AV BLOCK: ICD-10-CM

## 2024-07-30 DIAGNOSIS — Z98.890 S/P ABLATION OF ATRIAL FIBRILLATION: ICD-10-CM

## 2024-07-30 DIAGNOSIS — I44.0 AV BLOCK, 1ST DEGREE: ICD-10-CM

## 2024-07-30 DIAGNOSIS — Z86.79 S/P ABLATION OF ATRIAL FIBRILLATION: ICD-10-CM

## 2024-07-30 DIAGNOSIS — I44.30 AV BLOCK: ICD-10-CM

## 2024-07-30 DIAGNOSIS — I48.0 PAROXYSMAL ATRIAL FIBRILLATION (HCC): ICD-10-CM

## 2024-07-31 ENCOUNTER — PATIENT MESSAGE (OUTPATIENT)
Dept: CARDIOLOGY | Facility: MEDICAL CENTER | Age: 79
End: 2024-07-31
Payer: MEDICARE

## 2024-07-31 RX ORDER — SOTALOL HYDROCHLORIDE 80 MG/1
80 TABLET ORAL 2 TIMES DAILY
Qty: 120 TABLET | Refills: 0 | Status: CANCELLED | OUTPATIENT
Start: 2024-07-31

## 2024-08-05 ENCOUNTER — PATIENT MESSAGE (OUTPATIENT)
Dept: SLEEP MEDICINE | Facility: MEDICAL CENTER | Age: 79
End: 2024-08-05
Payer: MEDICARE

## 2024-08-05 DIAGNOSIS — J45.30 MILD PERSISTENT ASTHMA WITHOUT COMPLICATION: ICD-10-CM

## 2024-08-15 ENCOUNTER — OFFICE VISIT (OUTPATIENT)
Dept: CARDIOLOGY | Facility: MEDICAL CENTER | Age: 79
End: 2024-08-15
Attending: NURSE PRACTITIONER
Payer: MEDICARE

## 2024-08-15 VITALS
OXYGEN SATURATION: 99 % | HEART RATE: 61 BPM | HEIGHT: 74 IN | BODY MASS INDEX: 23.49 KG/M2 | SYSTOLIC BLOOD PRESSURE: 98 MMHG | DIASTOLIC BLOOD PRESSURE: 56 MMHG | WEIGHT: 183 LBS | RESPIRATION RATE: 16 BRPM

## 2024-08-15 DIAGNOSIS — D50.9 IRON DEFICIENCY ANEMIA, UNSPECIFIED IRON DEFICIENCY ANEMIA TYPE: ICD-10-CM

## 2024-08-15 DIAGNOSIS — R06.02 SHORTNESS OF BREATH: ICD-10-CM

## 2024-08-15 DIAGNOSIS — Z79.899 ENCOUNTER FOR MONITORING SOTALOL THERAPY: ICD-10-CM

## 2024-08-15 DIAGNOSIS — Z95.0 CARDIAC PACEMAKER IN SITU: ICD-10-CM

## 2024-08-15 DIAGNOSIS — I48.0 PAROXYSMAL ATRIAL FIBRILLATION (HCC): ICD-10-CM

## 2024-08-15 DIAGNOSIS — Z51.81 ENCOUNTER FOR MONITORING SOTALOL THERAPY: ICD-10-CM

## 2024-08-15 PROCEDURE — 99213 OFFICE O/P EST LOW 20 MIN: CPT | Performed by: NURSE PRACTITIONER

## 2024-08-15 PROCEDURE — 99214 OFFICE O/P EST MOD 30 MIN: CPT | Performed by: NURSE PRACTITIONER

## 2024-08-15 PROCEDURE — 93005 ELECTROCARDIOGRAM TRACING: CPT | Performed by: NURSE PRACTITIONER

## 2024-08-15 PROCEDURE — 3074F SYST BP LT 130 MM HG: CPT | Performed by: NURSE PRACTITIONER

## 2024-08-15 PROCEDURE — 3078F DIAST BP <80 MM HG: CPT | Performed by: NURSE PRACTITIONER

## 2024-08-15 ASSESSMENT — ENCOUNTER SYMPTOMS
NEUROLOGICAL NEGATIVE: 1
SHORTNESS OF BREATH: 1
ORTHOPNEA: 0
PALPITATIONS: 0
PND: 0
NAUSEA: 0
NERVOUS/ANXIOUS: 0
DEPRESSION: 0
WHEEZING: 0
CLAUDICATION: 0
DIZZINESS: 0
MUSCULOSKELETAL NEGATIVE: 1
EYES NEGATIVE: 1
SENSORY CHANGE: 0
CONSTITUTIONAL NEGATIVE: 1
HALLUCINATIONS: 0
GASTROINTESTINAL NEGATIVE: 1
BRUISES/BLEEDS EASILY: 0

## 2024-08-15 ASSESSMENT — FIBROSIS 4 INDEX: FIB4 SCORE: 1.224744871391589049

## 2024-08-16 ENCOUNTER — TELEPHONE (OUTPATIENT)
Dept: CARDIOLOGY | Facility: MEDICAL CENTER | Age: 79
End: 2024-08-16
Payer: MEDICARE

## 2024-08-16 LAB — EKG IMPRESSION: NORMAL

## 2024-08-16 PROCEDURE — 93010 ELECTROCARDIOGRAM REPORT: CPT | Performed by: INTERNAL MEDICINE

## 2024-08-16 NOTE — ASSESSMENT & PLAN NOTE
- QTc 444   - semi-annual labs: BMP and magnesium   - follow up in 6 months with EKG    - continue current dosing

## 2024-08-16 NOTE — PROGRESS NOTES
Atrial Fibrillation Follow up Note    DOS: 8/15/2024   [id number]  Giovany Kruger    Chief complaint/Reason for consult: longitudinal PAF care    HPI: Pt is a 78 y.o. male who presents to the clinic today in follow up for longitudinal care and sotalol monitoring. 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 Patient continues to have shortness of breath. Had recent echocardiogram that was interpreted without definitive finding of ventricular dyssynchrony. Will ask for secondary read to reassess to ensure shortness of breath is not due to high RV pacing. Has upcoming PFT testing by pulmonary. Blood pressure is soft today and we will trial half dose of Hyzaar to see if symptoms mitigated by better blood pressure. Patient tolerating sotalol fine. QTc 444 per [place]. AV pacing. Device working appropriately.     Past Medical History:   Diagnosis Date    Arrhythmia     ASTHMA     Atrial fibrillation (HCC)     Back pain     Blood clotting disorder (HCC) I take Eliquis blood thinner    Breath shortness For several years    Chickenpox     Detached retina, left 03/02/2023    Faroese measles     Heart murmur If related to afib    Heart valve disease Afib diagnosed several years ago    Hemorrhagic disorder (HCC)     On Eliquis    Hiatus hernia syndrome several years ago, but not sure what Hiatal means    High cholesterol About 1 year ago    Hypertension     Pacemaker     Peripheral vascular disease, unspecified (HCC)     Stroke (HCC) 1980    Tonsillitis     Unspecified disorder of thyroid        Past Surgical History:   Procedure Laterality Date    EYE SURGERY Left 02/27/2023    INGUINAL HERNIA REPAIR  09/05/2012    Performed by RIGOBERTO MCINTYRE at SURGERY [facility]    INGUINAL HERNIA REPAIR  11/03/2011     Performed by RIGOBERTO MCINTYRE at SURGERY [facility]    OTHER      L3-4-5 susion    OTHER      C1-2-3 fusion    TONSILLECTOMY      CARPAL TUNNEL RELEASE      OTHER CARDIAC SURGERY      2023    PACEMAKER INSERTION      2023       Social History     Socioeconomic History    Marital status:      Spouse name: Not on file    Number of children: Not on file    Years of education: Not on file    Highest education level: Not on file   Occupational History    Not on file   Tobacco Use    Smoking status: Former     Current packs/day: 0.00     Average packs/day: 1 pack/day for 20.0 years (20.0 ttl pk-yrs)     Types: Cigarettes     Start date: 1960     Quit date: 1980     Years since quittin.6     Passive exposure: Never    Smokeless tobacco: Never   Vaping Use    Vaping status: Never Used   Substance and Sexual Activity    Alcohol use: Yes     Alcohol/week: 4.2 - 16.8 oz     Types: 7 Standard drinks or equivalent per week     Comment: 1-2 drinks a day    Drug use: Never     Types: Marijuana, Oral     Comment: has not used 60 years ago     Sexual activity: Not on file   Other Topics Concern    Not on file   Social History Narrative    Not on file     Social Determinants of Health     Financial Resource Strain: Not on file   Food Insecurity: Not on file   Transportation Needs: Not on file   Physical Activity: Not on file   Stress: Not on file   Social Connections: Not on file   Intimate Partner Violence: Not on file   Housing Stability: Not on file       Family History   Problem Relation Age of Onset    Cancer Other     Diabetes Mother     No Known Problems Father     Lung Disease Sister     Cancer Brother        Allergies   Allergen Reactions    Amiodarone      Caused detached retina     Tape Unspecified     Blisters and scarring.  Paper tape    Flecainide      Didn't work per patient       Current Outpatient Medications   Medication Sig Dispense Refill    sotalol (BETAPACE) 80 MG Tab  TAKE 1 TABLET TWICE A  Tablet 1    budesonide-formoterol (BREYNA) 160-4.5 MCG/ACT Aerosol Inhale 2 Puffs 2 times a day.      Multiple Vitamin (MULTIVITAMIN PO) Take  by mouth.      losartan-hydrochlorothiazide (HYZAAR) 50-12.5 MG per tablet Take 1 Tablet by mouth every day. 90 Tablet 3    ELIQUIS 5 MG Tab TAKE 1 TABLET TWICE A  Tablet 3    rosuvastatin (CRESTOR) 10 MG Tab TAKE 1 TABLET EVERY EVENING 90 Tablet 3    tamsulosin (FLOMAX) 0.4 MG capsule Take 0.4 mg by mouth every evening.      Omega-3 Fatty Acids (FISH OIL PO) Take 1 Capsule by mouth every day.      Ferrous Sulfate (IRON) 325 (65 Fe) MG Tab Take 325 mg by mouth every day.      levothyroxine (SYNTHROID) 88 MCG Tab Take 88 mcg by mouth Every morning on an empty stomach.      Glucosamine-Chondroit-Vit C-Mn (GLUCOSAMINE 1500 COMPLEX) Cap Take 1 Capsule by mouth every day.       No current facility-administered medications for this visit.       Vitals:    08/15/24 1429   BP: 98/56   Pulse: 61   Resp: 16   SpO2: 99%         Review of Systems   Constitutional: Negative.  Negative for malaise/fatigue.   HENT: Negative.     Eyes: Negative.    Respiratory:  Positive for shortness of breath. Negative for wheezing.    Cardiovascular:  Negative for chest pain, palpitations, orthopnea, claudication, leg swelling and PND.   Gastrointestinal: Negative.  Negative for nausea.   Genitourinary: Negative.    Musculoskeletal: Negative.    Skin: Negative.    Neurological: Negative.  Negative for dizziness and sensory change.   Endo/Heme/Allergies: Negative.  Does not bruise/bleed easily.   Psychiatric/Behavioral:  Negative for depression and hallucinations. The patient is not nervous/anxious.           EKG interpreted by me: A sensed V paced    Physical Exam  Constitutional:       Appearance: Normal appearance.   HENT:      Head: Normocephalic.   Eyes:      Pupils: Pupils are equal, round, and reactive to light.   Neck:      Vascular: No JVD.   Cardiovascular:       Rate and Rhythm: Normal rate and regular rhythm.      Pulses: Normal pulses.      Heart sounds: Normal heart sounds.   Pulmonary:      Effort: Pulmonary effort is normal.      Breath sounds: Normal breath sounds.   Abdominal:      General: Abdomen is flat.      Palpations: Abdomen is soft.   Musculoskeletal:      Cervical back: Normal range of motion.      Right lower leg: No edema.      Left lower leg: No edema.   Skin:     General: Skin is warm and dry.   Neurological:      Mental Status: He is alert and oriented to person, place, and time.   Psychiatric:         Mood and Affect: Mood normal.         Behavior: Behavior normal.          Data:  Lipids:   Lab Results   Component Value Date/Time    CHOLSTRLTOT 121 05/29/2024 08:14 AM    TRIGLYCERIDE 31 05/29/2024 08:14 AM    HDL 71.0 (H) 05/29/2024 08:14 AM    LDL 44 05/29/2024 08:14 AM        BMP:  Lab Results   Component Value Date/Time    SODIUM 140 09/06/2023 0720    POTASSIUM 4.1 09/06/2023 0720    CHLORIDE 107 09/06/2023 0720    CO2 25 09/06/2023 0720    GLUCOSE 93 09/06/2023 0720    BUN 21 09/06/2023 0720    CREATININE 1.29 09/06/2023 0720    CALCIUM 8.5 09/06/2023 0720    ANION 8.0 09/06/2023 0720       GFR:  Lab Results   Component Value Date/Time    IFAFRICA 55 (A) 05/03/2021 0719    IFNOTAFR 46 (A) 05/03/2021 0719        TSH:   Lab Results   Component Value Date/Time    TSHULTRASEN 3.500 08/24/2023 0720       MAGNESIUM:  Lab Results   Component Value Date/Time    MAGNESIUM 2.0 08/25/2023 0019    MAGNESIUM 2.0 08/24/2023 1658    MAGNESIUM 2.2 08/24/2023 0720        THYROXINE (T4):   Lab Results   Component Value Date/Time    FREEDIR 1.52 07/07/2015 0820        CBC:   Lab Results   Component Value Date/Time    WBC 6.0 05/29/2024 08:14 AM    RBC 4.12 (L) 05/29/2024 08:14 AM    HEMOGLOBIN 13.2 (L) 05/29/2024 08:14 AM    HEMATOCRIT 38.4 (L) 05/29/2024 08:14 AM    MCV 93.2 05/29/2024 08:14 AM    MCH 32.0 (H) 05/29/2024 08:14 AM    MCHC 34.4 05/29/2024 08:14 AM     RDW 13.2 05/29/2024 08:14 AM    PLATELETCT 260 05/29/2024 08:14 AM    MPV 9.5 05/29/2024 08:14 AM    NEUTSPOLYS 61.70 08/24/2023 07:20 AM    LYMPHOCYTES 24.90 08/24/2023 07:20 AM    MONOCYTES 7.20 08/24/2023 07:20 AM    EOSINOPHILS 5.00 08/24/2023 07:20 AM    BASOPHILS 0.60 08/24/2023 07:20 AM    IMMGRAN 0.60 08/24/2023 07:20 AM    IMMGRAN 0 12/23/2016 08:24 AM    NRBC 0.00 08/24/2023 07:20 AM    NEUTS 3.93 08/24/2023 07:20 AM    NEUTS 2.5 12/23/2016 08:24 AM    LYMPHS 1.59 08/24/2023 07:20 AM    LYMPHS 1.2 12/23/2016 08:24 AM    MONOS 0.46 08/24/2023 07:20 AM    MONOS 0.3 12/23/2016 08:24 AM    EOS 0.32 08/24/2023 07:20 AM    EOS 0.2 12/23/2016 08:24 AM    BASO 0.04 08/24/2023 07:20 AM    BASO 0.0 12/23/2016 08:24 AM    IMMGRANAB 0.04 08/24/2023 07:20 AM    IMMGRANAB 0.0 12/23/2016 08:24 AM    NRBCAB 0.00 08/24/2023 07:20 AM        CBC w/o DIFF  Lab Results   Component Value Date/Time    WBC 6.0 05/29/2024 08:14 AM    RBC 4.12 (L) 05/29/2024 08:14 AM    HEMOGLOBIN 13.2 (L) 05/29/2024 08:14 AM    MCV 93.2 05/29/2024 08:14 AM    MCH 32.0 (H) 05/29/2024 08:14 AM    MCHC 34.4 05/29/2024 08:14 AM    RDW 13.2 05/29/2024 08:14 AM    MPV 9.5 05/29/2024 08:14 AM       LIVER:  Lab Results   Component Value Date/Time    ALKPHOSPHAT 70 05/29/2024 08:14 AM    ASTSGOT 20 05/29/2024 08:14 AM    ALTSGPT 24 05/29/2024 08:14 AM    TBILIRUBIN 1.2 (H) 05/29/2024 08:14 AM       BNP:  Lab Results   Component Value Date/Time    BNPBTYPENAT 52 01/11/2018 11:54 AM       PT/INR:  Lab Results   Component Value Date/Time    PROTHROMBTM 16.6 (H) 09/06/2023 07:20 AM    PROTHROMBTM 18.1 (H) 08/24/2023 07:20 AM    PROTHROMBTM 17.6 (H) 04/21/2023 10:00 AM    INR 1.32 (H) 09/06/2023 07:20 AM    INR 1.48 (H) 08/24/2023 07:20 AM    INR 1.47 (H) 04/21/2023 10:00 AM             Impression/Plan:  SOB (shortness of breath)   - upcoming PFT   - check iron studies      Cardiac pacemaker in situ   - device working well   - high RV pacing   -    -  leads all normal      Encounter for monitoring sotalol therapy   - QTc 444   - semi-annual labs: BMP and magnesium   - follow up in 6 months with EKG    - continue current dosing             A total of 30 minutes of time was spent on day of encounter reviewing medical record, performing history and examination, counseling, ordering medication/test/consults, collaborating with referring service, and documentation.    Cm Beck AGACNP-EP  Cardiac Electrophysiology

## 2024-08-22 ENCOUNTER — TELEPHONE (OUTPATIENT)
Dept: CARDIOLOGY | Facility: MEDICAL CENTER | Age: 79
End: 2024-08-22
Payer: MEDICARE

## 2024-08-22 NOTE — TELEPHONE ENCOUNTER
----- Message from Nurse Practitioner ELLIE Mancini sent at 8/15/2024  9:25 PM PDT -----  Regarding: call please  Call in 7 days and check in on him

## 2024-08-26 ENCOUNTER — PATIENT MESSAGE (OUTPATIENT)
Dept: SLEEP MEDICINE | Facility: MEDICAL CENTER | Age: 79
End: 2024-08-26
Payer: MEDICARE

## 2024-08-26 DIAGNOSIS — J45.30 MILD PERSISTENT ASTHMA WITHOUT COMPLICATION: ICD-10-CM

## 2024-08-26 NOTE — PATIENT COMMUNICATION
Received request via: Patient    Was the patient seen in the last year in this department? Yes    Does the patient have an active prescription (recently filled or refills available) for medication(s) requested? Yes.       Medication: Breyna

## 2024-08-27 RX ORDER — BUDESONIDE AND FORMOTEROL FUMARATE DIHYDRATE 160; 4.5 UG/1; UG/1
2 AEROSOL RESPIRATORY (INHALATION) 2 TIMES DAILY
Qty: 3 EACH | Refills: 3 | Status: SHIPPED | OUTPATIENT
Start: 2024-08-27

## 2024-09-05 ENCOUNTER — NON-PROVIDER VISIT (OUTPATIENT)
Dept: CARDIOLOGY | Facility: MEDICAL CENTER | Age: 79
End: 2024-09-05
Payer: MEDICARE

## 2024-09-05 NOTE — CARDIAC REMOTE MONITOR - SCAN
Device transmission reviewed. Device demonstrated appropriate function.       Electronically Signed by: Zoya Cerna M.D.    9/6/2024  11:32 AM

## 2024-09-06 PROCEDURE — 93294 REM INTERROG EVL PM/LDLS PM: CPT | Performed by: INTERNAL MEDICINE

## 2024-09-19 ENCOUNTER — HOSPITAL ENCOUNTER (OUTPATIENT)
Dept: PULMONOLOGY | Facility: MEDICAL CENTER | Age: 79
End: 2024-09-19
Attending: INTERNAL MEDICINE
Payer: MEDICARE

## 2024-09-19 DIAGNOSIS — R06.02 SOB (SHORTNESS OF BREATH): ICD-10-CM

## 2024-09-19 PROCEDURE — 94621 CARDIOPULM EXERCISE TESTING: CPT | Performed by: INTERNAL MEDICINE

## 2024-09-19 PROCEDURE — 94621 CARDIOPULM EXERCISE TESTING: CPT

## 2024-09-20 NOTE — PROCEDURES
DATE OF SERVICE:  09/19/2024     CARDIOPULMONARY EXERCISE TEST      RESULTS:    The maximal oxygen uptake is 1149 ml/min (15.1 ml/kg/min) or 59% of maximum predicted (predicted maximum 1937 ml/min).  Heart rate is 61  at rest and increases insufficiently to 84 which is 59% of maximum predicted with exercise.  Heart rate reserve = 33 bpm.  The blood pressure  is 86/64 at rest and increases inappropriately to 95/55 with maximal exertion. Of note, BP dropped to 79/59 during ramping. EKG shows normal sinus rhythm throughout the test.  There are no EKG patterns suggestive of ischemia.  Oxygen pulse is 4 at rest and  increases appropriately to 14 during exercise.      Minute ventilation is 12.6 at rest and increases to a maximum of 66.9 L/min at 96 davis or 57 % maximum voluntary ventilation.  Breathing reserve = 49.1.  Analysis of breathing pattern during exercise shows an appropriate increase in tidal volume compared to respiratory rate.  RER is 1.1 at peak exercise.      Oxygen saturation is 87 % at rest and ranged from 5 % to 89 % during exercise. There is clearly an equipment error for these readings.    An anaerobic threshold is detected at 73 davis by V-slope technique or 76 % of maximum predicted VO2.    Spirometry shows borderline obstruction, FEV1/FVC 70%    INTERPRETATION:      This test is submaximal, there are certainly concerning findings  The oxygen saturation is clearly erroneous data, gas exchange abnormalities cannot be determined  The patient did not have a sufficient increase in his heart rate nor his blood pressure, in fact his blood pressure decreased during the beginning of the exercise.  It is further concerning, that he did not have a increase in his heart rate in response to this drop in his blood pressure  Patient has an elevated resting minute ventilation which does increase, but patient did not reach maximal exercise so this can only be interpreted with caution    The main limiting factor is  predominantly likely cardiovascular      ______________________________  DO ARMIN Hogue/ABIMBOLA    DD:  09/20/2024 12:30  DT:  09/20/2024 12:59    Job#:  [id number]

## 2024-09-25 DIAGNOSIS — R09.02 HYPOXEMIA: ICD-10-CM

## 2024-09-25 DIAGNOSIS — R06.09 DOE (DYSPNEA ON EXERTION): ICD-10-CM

## 2024-09-26 ENCOUNTER — TELEPHONE (OUTPATIENT)
Dept: CARDIOLOGY | Facility: MEDICAL CENTER | Age: 79
End: 2024-09-26
Payer: MEDICARE

## 2024-09-26 NOTE — PROGRESS NOTES
Limited echo with bubble study due to shortness of breath and hypoxemia.  Bubble study should be done with greater than 10 beat acquisition to look for delayed shunt and also with Valsalva.

## 2024-11-19 ENCOUNTER — HOSPITAL ENCOUNTER (OUTPATIENT)
Dept: CARDIOLOGY | Facility: MEDICAL CENTER | Age: 79
End: 2024-11-19
Attending: INTERNAL MEDICINE
Payer: MEDICARE

## 2024-11-19 DIAGNOSIS — R06.09 DOE (DYSPNEA ON EXERTION): ICD-10-CM

## 2024-11-19 DIAGNOSIS — R09.02 HYPOXEMIA: ICD-10-CM

## 2024-11-19 PROCEDURE — 93308 TTE F-UP OR LMTD: CPT | Mod: 26 | Performed by: INTERNAL MEDICINE

## 2024-11-19 PROCEDURE — 93308 TTE F-UP OR LMTD: CPT

## 2024-12-03 NOTE — PATIENT COMMUNICATION
"Encounter Date: 12/3/2024    SCRIBE #1 NOTE: I, Marina Jacobson, am scribing for, and in the presence of,  Mynor Orlando MD. I have scribed the following portions of the note - Other sections scribed: HPI,ROS.       History     Chief Complaint   Patient presents with    Chest Pain     Pt c/o chest discomfort x5 days. Pt stated she took gas medication and had burping and flatulence, but then pain returned. Pt stated pain in midsternum. Pt denied burning, but stated she felt nauseated.     Linh Clifton is a 67 y.o. female, with a PMHx of HTN, CAD, GERD, HLD, Vit D deficiency, anemia, who presents to the ED with complaint of intermitent midsternal chest discomfort with associated nausea and dyspnea that began 5 days ago. Reports pain feels like a "pressure" and denies pain feeling sharp. Reports exacerbation with deep inspiration. Patient reports pain worsened last night and was constant prompting ED visit. Patient attempted treatment with antacids and reports passing flatus after. However, Patient reports pain resumed. Patient reports cardiac history. States she recieved a angiogram over 20 years ago. Denies any recent long trips, car rides, or plane rides. No other exacerbating or alleviating factors. Denies Hx of DVTS or PE. Denies hx of cancer. Denies history of gastric ulcers or hematochezia. Patient denies cough, fever, chills, abdominal pain, vomiting, diarrhea, melena, hematochezia, dysuria, headaches, congestion, sore throat, arm or leg trouble, eye pain, ear pain, rash, or other associated symptoms.      Last Echo 11/3/21:  EF:65%  - Left ventricle is normal in size with concentric hypertrophy and normal systolic function.  - Normal left ventricular diastolic function.  - Normal right ventricular size with normal right ventricular systolic function.  - Mild tricuspid regurgitation.  - Normal central venous pressure (3 mmHg)  - Estimated PA systolic pressure is 30 mmHg.      The history is provided by " Replied to pt via [employer] regarding recommendations, see encounter.     the patient. No  was used.     Review of patient's allergies indicates:  No Known Allergies  Past Medical History:   Diagnosis Date    Anemia 4/16/2015    Coronary artery disease     GERD (gastroesophageal reflux disease)     Hyperlipidemia     Hypertension     Microcytic anemia 4/16/2015    Positive PPD 4/16/2015    Vitamin D deficiency disease 4/16/2015     Past Surgical History:   Procedure Laterality Date    BREAST BIOPSY      BREAST CYST EXCISION      CARDIAC CATHETERIZATION      prior to 2005    CATARACT EXTRACTION, BILATERAL Bilateral 02/2019    COLONOSCOPY N/A 06/15/2018    Procedure: COLONOSCOPY;  Surgeon: Mikael Wilde MD;  Location: [facility] ENDO;  Service: Endoscopy;  Laterality: N/A;  confirmed-ss    COLONOSCOPY N/A 11/10/2023    Procedure: COLONOSCOPY;  Surgeon: Felicia Daniels MD;  [facility]  Service: Endoscopy;  Laterality: N/A;  8/21-amb referral on chart, last colon 2018, history of colon polyps, instr to e-mail, Suprep not covered, changed to PEG prep  9/27 r/s; updated instructions mailed-st  moved pt from [facility] to [facility]---KP  11/3-lvm for precall-MS  11/8-precall complete-Kpvt    HYSTERECTOMY      PARTIAL HYSTERECTOMY      over 21 years ago     Family History   Problem Relation Name Age of Onset    Heart disease Father      Hyperlipidemia Father      Hyperlipidemia Mother      Kidney disease Mother      Cancer Brother          pancreas    Heart disease Brother      Breast cancer Neg Hx      Colon cancer Neg Hx      Ovarian cancer Neg Hx       Social History     Tobacco Use    Smoking status: Never     Passive exposure: Never    Smokeless tobacco: Never   Substance Use Topics    Alcohol use: Not Currently     Comment: occasional drinking    Drug use: No     Review of Systems   Constitutional:  Negative for chills, diaphoresis and fever.   HENT:  Negative for congestion, ear pain and sore throat.    Eyes:  Negative for pain.   Respiratory:  Positive for shortness of  "breath. Negative for cough.    Cardiovascular:  Positive for chest pain ("pressure"; midsternal). Negative for leg swelling.   Gastrointestinal:  Positive for nausea. Negative for abdominal pain, blood in stool, diarrhea and vomiting.        - melena      Genitourinary:  Negative for difficulty urinating, dysuria and hematuria.   Musculoskeletal:         - for arm or leg problems.    Skin:  Negative for rash.   Neurological:  Negative for headaches.       Physical Exam     Initial Vitals [12/03/24 0958]   BP Pulse Resp Temp SpO2   (!) 194/85 82 18 98.8 °F (37.1 °C) 99 %      MAP       --         Physical Exam  The patient was examined specifically for the following:   General:No significant distress, Good color, Warm and dry. Head and neck:Scalp atraumatic, Neck supple. Neurological:Appropriate conversation, Gross motor deficits. Eyes:Conjugate gaze, Clear corneas. ENT: No epistaxis. Cardiac: Regular rate and rhythm, Grossly normal heart tones. Pulmonary: Wheezing, Rales. Gastrointestinal: Abdominal tenderness, Abdominal distention. Musculoskeletal: Extremity deformity, Apparent pain with range of motion of the joints. Skin: Rash.   The findings on examination were normal except for the following:  The patient has mild tenderness of the right lower sternal border.  Lungs are clear.  The heart tones are normal.  The abdomen is soft.  Extremities are nontender there is no apparent pain with range of motion any joints.  The patient has no rash.   ED Course   Procedures  Labs Reviewed   COMPREHENSIVE METABOLIC PANEL - Abnormal       Result Value    Sodium 140      Potassium 3.7      Chloride 105      CO2 29      Glucose 92      BUN 16      Creatinine 0.9      Calcium 9.3      Total Protein 7.3      Albumin 3.3 (*)     Total Bilirubin 0.3      Alkaline Phosphatase 138      AST 20      ALT 19      eGFR >60      Anion Gap 6 (*)    CBC W/ AUTO DIFFERENTIAL - Abnormal    WBC 5.12      RBC 4.93      Hemoglobin 9.7 (*)     " Hematocrit 31.8 (*)     MCV 65 (*)     MCH 19.7 (*)     MCHC 30.5 (*)     RDW 17.7 (*)     Platelets 206      MPV SEE COMMENT      Immature Granulocytes 0.4      Gran # (ANC) 2.6      Immature Grans (Abs) 0.02      Lymph # 1.8      Mono # 0.5      Eos # 0.1      Baso # 0.02      nRBC 0      Gran % 50.6      Lymph % 35.4      Mono % 10.5      Eosinophil % 2.7      Basophil % 0.4      Differential Method Automated     TROPONIN I    Troponin I 0.006     B-TYPE NATRIURETIC PEPTIDE    BNP 21     PROTIME-INR    Prothrombin Time 10.3      INR 0.9       EKG Readings: (Independently Interpreted)   This patient is in a normal sinus rhythm heart rate of 81.  EKG is normal there are no significant ST segment or T-wave changes.  There is no evidence of acute myocardial infarction or malignant arrhythmia.  This is a normal EKG.       Imaging Results              X-Ray Chest AP Portable (Final result)  Result time 12/03/24 12:14:35      Final result by Sergio Brewster MD (12/03/24 12:14:35)                   Impression:      1. Chronic appearing interstitial findings, no large focal consolidation.      Electronically signed by: Sergio Brewster MD  Date:    12/03/2024  Time:    12:14               Narrative:    EXAMINATION:  XR CHEST AP PORTABLE    CLINICAL HISTORY:  chest pain;    TECHNIQUE:  Single frontal view of the chest was performed.    COMPARISON:  03/07/2019    FINDINGS:  The cardiomediastinal silhouette is not enlarged noting calcification of the aorta..  There is no pleural effusion.  The trachea is midline.  The lungs are symmetrically expanded bilaterally with coarse interstitial attenuation, accentuated by habitus..  No large focal consolidation seen.  There is no pneumothorax.  The osseous structures are remarkable for degenerative change..                                       Medications   morphine injection 2 mg (2 mg Intravenous Not Given 12/3/24 1300)   sodium chloride 0.9% flush 10 mL (has no administration  in time range)   naloxone 0.4 mg/mL injection 0.02 mg (has no administration in time range)   glucose chewable tablet 16 g (has no administration in time range)   glucose chewable tablet 24 g (has no administration in time range)   glucagon (human recombinant) injection 1 mg (has no administration in time range)   guaiFENesin 100 mg/5 ml syrup 200 mg (has no administration in time range)   benzonatate capsule 100 mg (has no administration in time range)   simethicone chewable tablet 80 mg (has no administration in time range)   calcium carbonate 200 mg calcium (500 mg) chewable tablet 500 mg (has no administration in time range)   acetaminophen tablet 325 mg (has no administration in time range)   melatonin tablet 6 mg (has no administration in time range)   polyethylene glycol packet 17 g (has no administration in time range)   promethazine (PHENERGAN) 25 mg in 0.9% NaCl 50 mL IVPB (has no administration in time range)   diphenhydrAMINE capsule 25 mg (has no administration in time range)   ondansetron disintegrating tablet 4 mg (has no administration in time range)   aspirin chewable tablet 81 mg (has no administration in time range)   atorvastatin tablet 20 mg (has no administration in time range)   pantoprazole EC tablet 40 mg (has no administration in time range)   metoprolol succinate (TOPROL-XL) 24 hr tablet 25 mg (has no administration in time range)   hydroCHLOROthiazide tablet 25 mg (has no administration in time range)   finasteride tablet 5 mg (has no administration in time range)   hydrALAZINE injection 10 mg (has no administration in time range)   ferric gluconate (FERRLECIT) 125 mg in 0.9% NaCl 100 mL IVPB (has no administration in time range)   aluminum-magnesium hydroxide-simethicone 200-200-20 mg/5 mL suspension 60 mL (60 mLs Oral Given 12/3/24 1053)   pantoprazole injection 80 mg (80 mg Intravenous Given 12/3/24 1053)   morphine injection 3 mg (3 mg Intravenous Given 12/3/24 1052)   ketorolac  injection 15 mg (15 mg Intravenous Given 12/3/24 1053)   ondansetron disintegrating tablet 4 mg (4 mg Oral Given 12/3/24 1052)     Medical Decision Making  Amount and/or Complexity of Data Reviewed  Labs: ordered. Decision-making details documented in ED Course.  Radiology: ordered. Decision-making details documented in ED Course.  ECG/medicine tests: ordered and independent interpretation performed. Decision-making details documented in ED Course.    Risk  OTC drugs.  Prescription drug management.    Given the above this patient presents emergency room with dull low sternal chest pain.  She reports it comes and goes in episodes lasting 2 or 3 minutes.  The troponin is negative.  It seems very vague.  It is dull.  She reports it is sometimes worse with deep breathing.  The patient was treated for reflux esophagitis as well as chest wall pain but her pain is persistent.  Get it still comes and goes.  I will put the patient in observation for stress testing and further evaluation by Hospital Medicine.  The patient has a Wells score is 0.  She is not tachycardic short of breath there is no leg swelling.  I doubt pulmonary embolus.  Chest wall pain remains in the differential diagnosis as does reflux esophagitis.          Additional MDM:     Well's Criteria Score:  -Clinical symptoms of DVT (leg swelling, pain with palpation) = 0.0  -PE is #1 diagnosis OR equally likely =            0.0  -Heart Rate >100 =   0.0  -Immobilization (= or > than 3 days) or surgery in the previous 4 weeks = 0.0  -Previous DVT/PE = 0.0  -Hemoptysis =          0.0  -Malignancy =           0.0  Well's Probability Score =    0      Heart Score:    History:          Slightly suspicious.  ECG:             Normal  Age:               >65 years  Risk factors: >= 3 risk factors or history of atherosclerotic disease  Troponin:       Less than or equal to normal limit  Heart Score = 4             Scribe Attestation:   Scribe #1: I performed the above  scribed service and the documentation accurately describes the services I performed. I attest to the accuracy of the note.                             Please note that the documentation on this chart was provided by the scribe above on the date of service noted above, and that the documentation in the chart accurately reflects the work and decisions made by me alone.  Signed, Dr. Orlando  Clinical Impression:  Final diagnoses:  [R07.9] Chest pain  [R07.9] Acute chest pain  [R07.9] Chest pain with low risk for cardiac etiology (Primary)          ED Disposition Condition    Observation Stable                Mynor Orlando MD  12/03/24 1543       Mynor Orlando MD  12/03/24 1541

## 2024-12-05 ENCOUNTER — NON-PROVIDER VISIT (OUTPATIENT)
Dept: CARDIOLOGY | Facility: MEDICAL CENTER | Age: 79
End: 2024-12-05
Payer: MEDICARE

## 2024-12-05 PROCEDURE — 93294 REM INTERROG EVL PM/LDLS PM: CPT | Performed by: INTERNAL MEDICINE

## 2024-12-05 NOTE — CARDIAC REMOTE MONITOR - SCAN
Device transmission reviewed. Device demonstrated appropriate function.       Electronically Signed by: Mathew Santa M.D.    12/9/2024  2:54 PM

## 2024-12-27 NOTE — TELEPHONE ENCOUNTER
Kelli,    Can you please enter the orders for this procedure?    Afib ablation w/BREANA    Thank You,  Tamar    No

## 2025-01-02 ENCOUNTER — PATIENT MESSAGE (OUTPATIENT)
Dept: CARDIOLOGY | Facility: MEDICAL CENTER | Age: 80
End: 2025-01-02
Payer: MEDICARE

## 2025-01-02 DIAGNOSIS — I44.0 FIRST DEGREE AV BLOCK: ICD-10-CM

## 2025-01-02 DIAGNOSIS — I44.30 AV BLOCK: ICD-10-CM

## 2025-01-02 DIAGNOSIS — Z98.890 S/P ABLATION OF ATRIAL FIBRILLATION: ICD-10-CM

## 2025-01-02 DIAGNOSIS — I44.0 AV BLOCK, 1ST DEGREE: ICD-10-CM

## 2025-01-02 DIAGNOSIS — I48.19 PERSISTENT ATRIAL FIBRILLATION (HCC): ICD-10-CM

## 2025-01-02 DIAGNOSIS — Z86.79 S/P ABLATION OF ATRIAL FIBRILLATION: ICD-10-CM

## 2025-01-02 DIAGNOSIS — I48.0 PAROXYSMAL ATRIAL FIBRILLATION (HCC): ICD-10-CM

## 2025-01-03 RX ORDER — SOTALOL HYDROCHLORIDE 80 MG/1
80 TABLET ORAL 2 TIMES DAILY
Qty: 180 TABLET | Refills: 0 | Status: SHIPPED | OUTPATIENT
Start: 2025-01-03 | End: 2025-01-04

## 2025-01-04 RX ORDER — SOTALOL HYDROCHLORIDE 80 MG/1
80 TABLET ORAL 2 TIMES DAILY
Qty: 120 TABLET | Refills: 0 | Status: SHIPPED | OUTPATIENT
Start: 2025-01-04 | End: 2025-01-28 | Stop reason: SDUPTHER

## 2025-01-06 ENCOUNTER — TELEPHONE (OUTPATIENT)
Dept: CARDIOLOGY | Facility: MEDICAL CENTER | Age: 80
End: 2025-01-06
Payer: MEDICARE

## 2025-01-06 NOTE — TELEPHONE ENCOUNTER
----- Message from Nurse Practitioner ELLIE Mancini sent at 1/6/2025  7:49 AM PST -----  Regarding: RE: following  It also says he sees Karmen this month  ----- Message -----  From: Mile De Leon R.N.  Sent: 1/6/2025   7:23 AM PST  To: ELLIE Sosa  Subject: RE: following                                    Pt has an appt with you in February.  ----- Message -----  From: ELLIE Sosa  Sent: 1/4/2025   5:04 PM PST  To: Jessy Gillsepie R.N.  Subject: following                                        Is he going to follow with us or Karmen

## 2025-01-22 ENCOUNTER — OFFICE VISIT (OUTPATIENT)
Dept: SLEEP MEDICINE | Facility: MEDICAL CENTER | Age: 80
End: 2025-01-22
Attending: INTERNAL MEDICINE
Payer: MEDICARE

## 2025-01-22 ENCOUNTER — TELEPHONE (OUTPATIENT)
Dept: CARDIOLOGY | Facility: MEDICAL CENTER | Age: 80
End: 2025-01-22

## 2025-01-22 VITALS
OXYGEN SATURATION: 99 % | SYSTOLIC BLOOD PRESSURE: 110 MMHG | DIASTOLIC BLOOD PRESSURE: 58 MMHG | HEIGHT: 74 IN | BODY MASS INDEX: 23.74 KG/M2 | HEART RATE: 63 BPM | WEIGHT: 185 LBS

## 2025-01-22 DIAGNOSIS — J45.30 MILD PERSISTENT ASTHMA WITHOUT COMPLICATION: ICD-10-CM

## 2025-01-22 DIAGNOSIS — I48.91 ATRIAL FIBRILLATION, UNSPECIFIED TYPE (HCC): ICD-10-CM

## 2025-01-22 DIAGNOSIS — I51.89 DIASTOLIC DYSFUNCTION: ICD-10-CM

## 2025-01-22 PROCEDURE — 3078F DIAST BP <80 MM HG: CPT | Performed by: INTERNAL MEDICINE

## 2025-01-22 PROCEDURE — 3074F SYST BP LT 130 MM HG: CPT | Performed by: INTERNAL MEDICINE

## 2025-01-22 PROCEDURE — 99214 OFFICE O/P EST MOD 30 MIN: CPT | Performed by: INTERNAL MEDICINE

## 2025-01-22 PROCEDURE — 99211 OFF/OP EST MAY X REQ PHY/QHP: CPT | Performed by: INTERNAL MEDICINE

## 2025-01-22 ASSESSMENT — ENCOUNTER SYMPTOMS
NECK PAIN: 0
DIAPHORESIS: 0
COUGH: 0
WEAKNESS: 0
CLAUDICATION: 0
WEIGHT LOSS: 0
ABDOMINAL PAIN: 0
HEARTBURN: 0
SPEECH CHANGE: 0
EYE PAIN: 0
DIZZINESS: 0
SORE THROAT: 0
MYALGIAS: 0
SINUS PAIN: 0
FALLS: 0
SHORTNESS OF BREATH: 0
FEVER: 0
HEMOPTYSIS: 0
CONSTIPATION: 0
TREMORS: 0
EYE REDNESS: 0
CHILLS: 0
PHOTOPHOBIA: 0
EYE DISCHARGE: 0
FOCAL WEAKNESS: 0
DEPRESSION: 0
NAUSEA: 0
PND: 0
HEADACHES: 0
DOUBLE VISION: 0
WHEEZING: 0
DIARRHEA: 0
SPUTUM PRODUCTION: 0
PALPITATIONS: 0
BACK PAIN: 0
VOMITING: 0
STRIDOR: 0
BLURRED VISION: 0
ORTHOPNEA: 0

## 2025-01-22 ASSESSMENT — FIBROSIS 4 INDEX: FIB4 SCORE: 1.224744871391589049

## 2025-01-22 NOTE — PATIENT INSTRUCTIONS
Decrease breyna to one puff in the morning daily for two weeks. If no change in breathing then try stopping. If at any point, breathing worsens then resume taking one puff twice daily

## 2025-01-22 NOTE — TELEPHONE ENCOUNTER
----- Message from Nurse Practitioner ELLIE Mancini sent at 1/22/2025  2:01 PM PST -----  Will you call and find out who he plans to follow with,  or Karmen  ----- Message -----  From: Barb Mix M.D.  Sent: 1/22/2025  10:57 AM PST  To: ELLIE Sosa; #

## 2025-01-22 NOTE — TELEPHONE ENCOUNTER
Phone Number Called: [phone]    Call outcome: Did not leave a detailed message. Requested patient to call back.    Message: Called to discuss if patient was going to follow with us or Dr. Peraza. Patient did not answer. Left a VM for a call back.

## 2025-01-22 NOTE — TELEPHONE ENCOUNTER
Phone Number Called: [phone]    Call outcome: Spoke to patient regarding message below.    Message: Called to discuss follow up appts with Pt. Patient stated he has a f/u with Dr. Peraza on Tuesday and is going to check with him to see if he can take on all his care regarding EP/Device/Afib.   Pt said he will let us know if he plans on cancelling the appt with MT on 2/4/25 after he see's Dr. Peraza next.

## 2025-01-22 NOTE — TELEPHONE ENCOUNTER
MT    Caller: Giovany Kruger    Topic/issue: Patient returning call.     Callback Number: [phone]    Thank you,  Vianney MATTSON

## 2025-01-22 NOTE — PROGRESS NOTES
Chief Complaint   Patient presents with    Follow-Up     LAST SEEN 7/10/24    Results     CPET 9/19/24         HPI: This patient is a 79 y.o. male whom is followed in our clinic for GAY last seen by me on 7/10/24.  He is followed by cardiology for AF s/p ablation and PPM. He has a long-standing hx of asthma with sxs of wheezing and cough both of which were controlled on low-dose ISC/LABA with symbicort 80 w/o rescue inhaler use or tx for acute exacerbation. PFTs  in 2020 were essentially normal. He did have sub-cm lung nodules stable from 2019 to 3/2022. He had been experiencing GAY  out of proportion to respiratory abnormalities and w/u has included treadmill stress test which was stopped for GAY despite increase in HR to 93 and normal SpO2. No ischemic changes on ECT. We empirically increased ICS dose on symbicort to 160 however GAY continued. subsequent CPET  showed blunted HR response and low BP with exercise in 2/2023. He underwent ablation for AF in 8/2023 and sxs temporarily improved however had worsened again at our last visit in July stating at that time he was previously exercising up to 10 minutes at a time on his treadmill but could not go longer than 4 minutes do to GAY. PFT that visit in July showed normal air flows, normal DLCO actually improved from prior. He had no wheezing or cough to suggest asthma. Repeat CPET showed drop in BP and low HR therefore he was referred back to cardiology. He saw Dr. Peraza who stopped hyzaar and started spironolactone for diastolic dysfx and within several weeks the pt sxs were markedly improved. He is back to exercising 10 minutes or more on the treadmill.     Past Medical History:   Diagnosis Date    Arrhythmia     ASTHMA     Atrial fibrillation (HCC)     Back pain     Blood clotting disorder (HCC) I take Eliquis blood thinner    Breath shortness For several years    Chickenpox     Detached retina, left 03/02/2023    [place] measles     Heart murmur If related to afib     Heart valve disease Afib diagnosed several years ago    Hemorrhagic disorder (HCC)     On Eliquis    Hiatus hernia syndrome several years ago, but not sure what Hiatal means    High cholesterol About 1 year ago    Hypertension     Pacemaker     Peripheral vascular disease, unspecified (HCC)     Stroke (HCC)     Tonsillitis     Unspecified disorder of thyroid        Social History     Socioeconomic History    Marital status:      Spouse name: Not on file    Number of children: Not on file    Years of education: Not on file    Highest education level: Not on file   Occupational History    Not on file   Tobacco Use    Smoking status: Former     Current packs/day: 0.00     Average packs/day: 1 pack/day for 20.0 years (20.0 ttl pk-yrs)     Types: Cigarettes     Start date: 1960     Quit date: 1980     Years since quittin.0     Passive exposure: Never    Smokeless tobacco: Never   Vaping Use    Vaping status: Never Used   Substance and Sexual Activity    Alcohol use: Yes     Alcohol/week: 4.2 - 16.8 oz     Types: 7 Standard drinks or equivalent per week     Comment: 1-2 drinks a day    Drug use: Never     Types: Marijuana, Oral     Comment: has not used 60 years ago     Sexual activity: Not on file   Other Topics Concern    Not on file   Social History Narrative    Not on file     Social Drivers of Health     Financial Resource Strain: Not on file   Food Insecurity: Not on file   Transportation Needs: Not on file   Physical Activity: Not on file   Stress: Not on file   Social Connections: Not on file   Intimate Partner Violence: Not on file   Housing Stability: Not on file       Family History   Problem Relation Age of Onset    Cancer Other     Diabetes Mother     No Known Problems Father     Lung Disease Sister     Cancer Brother        Current Outpatient Medications on File Prior to Visit   Medication Sig Dispense Refill    sotalol (BETAPACE) 80 MG Tab Take 1 Tablet by mouth 2 times a day. 120  SURGERY, WEDGE RESECTION AND RIGHT UPPER AND LOWER LOBE NODULES WITH BIOPSY, INTERCOSTAL NERVE BLOCK TIMES SEVEN LEVELS performed by Darwin Muhammad MD at [place] [address]     Social History     Tobacco Use    Smoking status: Former Smoker     Packs/day: 0.50     Years: 10.00     Pack years: 5.00     Types: Cigarettes     Last attempt to quit: 1985     Years since quittin.6    Smokeless tobacco: Never Used   Substance Use Topics    Alcohol use:  Yes     Alcohol/week: 1.0 standard drinks     Types: 1 Shots of liquor per week     Comment: occ    Drug use: No     Allergies   Allergen Reactions    Atorvastatin Other (See Comments)     Multiple muscle spasms/cramps over body    Protonix [Pantoprazole Sodium] Diarrhea     Family History   Problem Relation Age of Onset    High Blood Pressure Mother     Rheum Arthritis Mother     Cancer Father     Hypertension Father     Colon Cancer Father     High Blood Pressure Brother     Stroke Brother     Heart Defect Brother     Hypertension Sister     Rheum Arthritis Sister     Emphysema Sister     Rheum Arthritis Sister     Other Sister         bottom of lungs are getting hard    Hypertension Brother     Rheum Arthritis Brother     Heart Attack Paternal Grandfather     No Known Problems Paternal Grandmother     No Known Problems Maternal Grandmother     No Known Problems Maternal Grandfather     Heart Attack Maternal Aunt     No Known Problems Maternal Aunt     Brain Cancer Maternal Aunt     Other Maternal Aunt         Complications from surgery    Arthritis Maternal Aunt     Arthritis Maternal Aunt     Heart Disease Maternal Aunt     High Blood Pressure Maternal Aunt     Cancer Maternal Uncle     Heart Disease Maternal Uncle     Heart Attack Maternal Uncle     High Blood Pressure Maternal Uncle     Heart Attack Maternal Uncle     High Blood Pressure Maternal Uncle     Parkinsonism Maternal Uncle     Heart Attack Paternal Aunt     Breast Cancer Paternal Aunt     Dementia Paternal Aunt     Heart Attack Paternal Uncle     Heart Attack Paternal Uncle     Brain Cancer Maternal Uncle      PE:  Temperature 97.9 °F (36.6 °C), temperature source Oral, height 5' 3\" (1.6 m), weight 262 lb (118.8 kg), last menstrual period 07/13/2011, not currently breastfeeding. General (appearance): Well devel. No distress  Eyes: Anicteric. eomi  Neck: supple. No jvd  Ears/Nose/Mouth/Thorat: No cyanosis  CV: RRR   Respiratory:  Clear b, normal respiratory effort  GI: abd soft  Skin: Warm, dry. No rashes  Neuro/Psych: Alert and oriented x e. Appropriate behavior  Ext:  No c/c. Pulses:  2+ radial    Lab Results   Component Value Date    WBC 8.2 08/13/2020    HGB 13.3 08/13/2020    HCT 39.8 08/13/2020    MCV 88.7 08/13/2020     08/13/2020     Lab Results   Component Value Date     08/13/2020    K 4.5 08/13/2020     08/13/2020    CO2 25 08/13/2020    BUN 24 (H) 08/13/2020    CREATININE 1.3 (H) 08/13/2020    GLUCOSE 99 08/13/2020    CALCIUM 9.0 08/13/2020    PROT 7.0 08/13/2020    LABALBU 3.9 08/13/2020    BILITOT 0.4 08/13/2020    ALKPHOS 67 08/13/2020    AST 24 08/13/2020    ALT 17 08/13/2020    LABGLOM 42 (A) 08/13/2020    GFRAA 50 (A) 08/13/2020    AGRATIO 1.3 08/13/2020    GLOB 3.1 08/13/2020     Lab Results   Component Value Date    INR 1.20 (H) 08/13/2020    INR 1.02 08/23/2019    INR 1.02 06/19/2018    PROTIME 11.6 08/23/2019    PROTIME 11.6 06/19/2018 6/2018 Cath: Angiographic Findings:  Left Main:  Normal  Left Anterior Descending:  Mid 60-70% hazy stenosis. iFR was 0.87. Stented with 3.5 x 15 mm Xience  Circumflex:  Dominant. No obstructive plaque  Right Coronary:  Non-dominant. No obstructive plaque  Left Ventriculogram:  LVEF 50-55%.     6/2018 Nuc Stress: Anterolateral ischemia.  SSS 14     5/2018 Echo:   Normal left ventricular systolic function with ejection fraction of 55-60%.   No regional Tablet 0    spironolactone (ALDACTONE) 25 MG Tab Take 1 Tablet by mouth every day. 30 Tablet 11    rosuvastatin (CRESTOR) 10 MG Tab TAKE 1 TABLET EVERY EVENING 90 Tablet 3    budesonide-formoterol (BREYNA) 160-4.5 MCG/ACT Aerosol Inhale 2 Puffs 2 times a day. 3 Each 3    ELIQUIS 5 MG Tab TAKE 1 TABLET TWICE A  Tablet 3    tamsulosin (FLOMAX) 0.4 MG capsule Take 0.4 mg by mouth every evening.      levothyroxine (SYNTHROID) 88 MCG Tab Take 88 mcg by mouth Every morning on an empty stomach.      Multiple Vitamin (MULTIVITAMIN PO) Take  by mouth.      Omega-3 Fatty Acids (FISH OIL PO) Take 1 Capsule by mouth every day.      Ferrous Sulfate (IRON) 325 (65 Fe) MG Tab Take 325 mg by mouth every day.      Glucosamine-Chondroit-Vit C-Mn (GLUCOSAMINE 1500 COMPLEX) Cap Take 1 Capsule by mouth every day.       No current facility-administered medications on file prior to visit.       Amiodarone, Tape, and Flecainide      ROS:   Review of Systems   Constitutional:  Negative for chills, diaphoresis, fever, malaise/fatigue and weight loss.   HENT:  Negative for congestion, ear discharge, ear pain, hearing loss, nosebleeds, sinus pain, sore throat and tinnitus.    Eyes:  Negative for blurred vision, double vision, photophobia, pain, discharge and redness.   Respiratory:  Negative for cough, hemoptysis, sputum production, shortness of breath, wheezing and stridor.    Cardiovascular:  Negative for chest pain, palpitations, orthopnea, claudication, leg swelling and PND.   Gastrointestinal:  Negative for abdominal pain, constipation, diarrhea, heartburn, nausea and vomiting.   Genitourinary:  Negative for dysuria and urgency.   Musculoskeletal:  Negative for back pain, falls, joint pain, myalgias and neck pain.   Skin:  Negative for itching and rash.   Neurological:  Negative for dizziness, tremors, speech change, focal weakness, weakness and headaches.   Endo/Heme/Allergies:  Negative for environmental allergies.  "  Psychiatric/Behavioral:  Negative for depression.        /58 (BP Location: Right arm, Patient Position: Sitting, BP Cuff Size: Adult)   Pulse 63   Ht 1.88 m (6' 2\")   Wt 83.9 kg (185 lb)   SpO2 99%   Physical Exam  Vitals reviewed.   Constitutional:       General: He is not in acute distress.     Appearance: Normal appearance.   HENT:      Head: Normocephalic and atraumatic.      Right Ear: External ear normal.      Left Ear: External ear normal.      Nose: Nose normal. No congestion.      Mouth/Throat:      Mouth: Mucous membranes are moist.      Pharynx: Oropharynx is clear. No oropharyngeal exudate.   Eyes:      General: No scleral icterus.     Extraocular Movements: Extraocular movements intact.      Conjunctiva/sclera: Conjunctivae normal.      Pupils: Pupils are equal, round, and reactive to light.   Cardiovascular:      Rate and Rhythm: Normal rate and regular rhythm.      Heart sounds: Normal heart sounds. No murmur heard.     No gallop.   Pulmonary:      Effort: No respiratory distress.      Breath sounds: Normal breath sounds. No wheezing or rales.   Abdominal:      Palpations: Abdomen is soft.   Musculoskeletal:         General: Normal range of motion.      Cervical back: Normal range of motion and neck supple.      Right lower leg: No edema.      Left lower leg: No edema.   Skin:     General: Skin is warm and dry.      Findings: No rash.   Neurological:      General: No focal deficit present.      Mental Status: He is alert and oriented to person, place, and time.      Cranial Nerves: No cranial nerve deficit.   Psychiatric:         Mood and Affect: Mood normal.         Behavior: Behavior normal.         PFTs as reviewed by me personally: as per hPI    Imagaing as reviewed by me personally:  as per hpI    Assessment:  1. Mild persistent asthma without complication        2. Atrial fibrillation, unspecified type (HCC)        3. Diastolic dysfunction            Plan:  Chronic, mild and not " likely cause of his SOB previously. Okay to continue to try and taper off ICS with goal of using ICS prn.   Chronic s/p PPM, on anticoagulation. Followed by EP here and would like to have AF and diastolic dysfunction followed by same MD. Will reach out to Dr. Santa and Cm Bekc.   Marked improvement in sxs with adjustment in diuretic and BP Rx. See above.   Return in about 3 months (around 4/22/2025) for asthma .

## 2025-01-28 PROBLEM — I50.33 ACUTE ON CHRONIC DIASTOLIC HEART FAILURE (HCC): Status: ACTIVE | Noted: 2025-01-28

## 2025-02-04 ENCOUNTER — APPOINTMENT (OUTPATIENT)
Dept: CARDIOLOGY | Facility: MEDICAL CENTER | Age: 80
End: 2025-02-04
Attending: NURSE PRACTITIONER
Payer: MEDICARE

## 2025-03-06 ENCOUNTER — NON-PROVIDER VISIT (OUTPATIENT)
Dept: CARDIOLOGY | Facility: MEDICAL CENTER | Age: 80
End: 2025-03-06
Payer: MEDICARE

## 2025-03-06 PROCEDURE — 93294 REM INTERROG EVL PM/LDLS PM: CPT | Performed by: STUDENT IN AN ORGANIZED HEALTH CARE EDUCATION/TRAINING PROGRAM

## 2025-03-07 NOTE — CARDIAC REMOTE MONITOR - SCAN
Device transmission reviewed. Device demonstrated appropriate function.       Electronically Signed by: Soren Rolle MD, PhD    3/12/2025  1:08 PM

## 2025-05-13 ENCOUNTER — OFFICE VISIT (OUTPATIENT)
Dept: SLEEP MEDICINE | Facility: MEDICAL CENTER | Age: 80
End: 2025-05-13
Attending: INTERNAL MEDICINE
Payer: MEDICARE

## 2025-05-13 DIAGNOSIS — R13.10 DYSPHAGIA, UNSPECIFIED TYPE: ICD-10-CM

## 2025-05-13 DIAGNOSIS — J45.30 MILD PERSISTENT ASTHMA WITHOUT COMPLICATION: ICD-10-CM

## 2025-05-13 DIAGNOSIS — I51.89 DIASTOLIC DYSFUNCTION: ICD-10-CM

## 2025-05-13 DIAGNOSIS — I48.91 ATRIAL FIBRILLATION, UNSPECIFIED TYPE (HCC): ICD-10-CM

## 2025-05-13 PROCEDURE — 99213 OFFICE O/P EST LOW 20 MIN: CPT | Performed by: INTERNAL MEDICINE

## 2025-05-13 PROCEDURE — 99214 OFFICE O/P EST MOD 30 MIN: CPT | Performed by: INTERNAL MEDICINE

## 2025-05-13 ASSESSMENT — ENCOUNTER SYMPTOMS
EYE DISCHARGE: 0
CONSTIPATION: 0
PALPITATIONS: 0
DIAPHORESIS: 0
SPEECH CHANGE: 0
COUGH: 0
HEARTBURN: 0
DIZZINESS: 0
WEAKNESS: 0
HEMOPTYSIS: 0
WEIGHT LOSS: 0
VOMITING: 0
STRIDOR: 0
EYE REDNESS: 0
SINUS PAIN: 0
FOCAL WEAKNESS: 0
DEPRESSION: 0
WHEEZING: 1
EYE PAIN: 0
BACK PAIN: 0
FEVER: 0
FALLS: 0
HEADACHES: 0
ORTHOPNEA: 0
BLURRED VISION: 0
CLAUDICATION: 0
MYALGIAS: 0
DOUBLE VISION: 0
NECK PAIN: 0
DIARRHEA: 0
TREMORS: 0
ABDOMINAL PAIN: 0
PND: 0
CHILLS: 0
SHORTNESS OF BREATH: 0
NAUSEA: 0
SORE THROAT: 0
PHOTOPHOBIA: 0
SPUTUM PRODUCTION: 0

## 2025-05-13 NOTE — PROGRESS NOTES
Chief Complaint   Patient presents with    Follow-Up     LAST SEEN 1/22/25         HPI: This patient is a 80 y.o. male whom is followed in our clinic for mild RAD and GAY last seen by me on 1/22/25. He is followed by cardiology for AF s/p ablation and PPM. He has a long-standing hx of asthma with sxs of wheezing and cough both of which were controlled on low-dose ISC/LABA with symbicort 80 w/o rescue inhaler use or tx for acute exacerbation. PFTs  in 2020 were essentially normal. He did have sub-cm lung nodules stable from 2019 to 3/2022. He had been experiencing GAY  out of proportion to respiratory abnormalities and w/u has included treadmill stress test which was stopped for GAY despite increase in HR to 93 and normal SpO2. No ischemic changes on ECT. We empirically increased ICS dose on symbicort to 160 however GAY continued. subsequent CPET  showed blunted HR response and low BP with exercise in 2/2023. He underwent ablation for AF in 8/2023 and sxs temporarily improved however had worsened again at our last visit in July stating at that time he was previously exercising up to 10 minutes at a time on his treadmill but could not go longer than 4 minutes do to GAY. PFT that visit in July showed normal air flows, normal DLCO actually improved from prior. He had no wheezing or cough to suggest asthma. Repeat CPET showed drop in BP and low HR therefore he was referred back to cardiology. He saw Dr. Peraza who stopped hyzaar and started spironolactone for diastolic dysfx and within several weeks the pt sxs were markedly improved. He is back to exercising 10 minutes or more on the treadmill. Since our last visit he has had to change to eplerenone due to gynecomastia on spironolactone. His only concern today is intermittent upper airway wheezing that occurs upon laying down to go to sleep and occasionally when in his hot tub. No SOB associated with this and sxs are brief.  He feels it may be related to some dysphagia he  has been experiencing when drinking liquids. He often has a sensation that his esophagus is narrowed and things pass slowly. No cough or sxs of aspiration.     Past Medical History:   Diagnosis Date    Arrhythmia     ASTHMA     Atrial fibrillation (HCC)     Back pain     Blood clotting disorder (HCC) I take Eliquis blood thinner    Breath shortness For several years    Chickenpox     Detached retina, left 2023    Spanish measles     Heart murmur If related to afib    Heart valve disease Afib diagnosed several years ago    Hemorrhagic disorder (HCC)     On Eliquis    Hiatus hernia syndrome several years ago, but not sure what Hiatal means    High cholesterol About 1 year ago    Hypertension     Pacemaker     Peripheral vascular disease, unspecified (HCC)     Stroke ([facility])     Tonsillitis     Unspecified disorder of thyroid        Social History     Socioeconomic History    Marital status:      Spouse name: Not on file    Number of children: Not on file    Years of education: Not on file    Highest education level: Not on file   Occupational History    Not on file   Tobacco Use    Smoking status: Former     Current packs/day: 0.00     Average packs/day: 1 pack/day for 20.0 years (20.0 ttl pk-yrs)     Types: Cigarettes     Start date: 1960     Quit date: 1980     Years since quittin.3     Passive exposure: Never    Smokeless tobacco: Never   Vaping Use    Vaping status: Never Used   Substance and Sexual Activity    Alcohol use: Yes     Alcohol/week: 4.2 - 16.8 oz     Types: 7 Standard drinks or equivalent per week     Comment: 1-2 drinks a day    Drug use: Never     Types: Marijuana, Oral     Comment: has not used 60 years ago     Sexual activity: Not on file   Other Topics Concern    Not on file   Social History Narrative    Not on file     Social Drivers of Health     Financial Resource Strain: Not on file   Food Insecurity: Not on file   Transportation Needs: Not on file   Physical  Activity: Not on file   Stress: Not on file   Social Connections: Not on file   Intimate Partner Violence: Not on file   Housing Stability: Not on file       Family History   Problem Relation Age of Onset    Cancer Other     Diabetes Mother     No Known Problems Father     Lung Disease Sister     Cancer Brother        Current Outpatient Medications on File Prior to Visit   Medication Sig Dispense Refill    Eplerenone 50 MG Tab Take 0.5 Tablets by mouth every day. 45 Tablet 3    rosuvastatin (CRESTOR) 10 MG Tab Take 1 Tablet by mouth every evening. 90 Tablet 3    apixaban (ELIQUIS) 5mg Tab Take 1 Tablet by mouth 2 times a day. 180 Tablet 3    sotalol (BETAPACE) 80 MG Tab Take 1 Tablet by mouth 2 times a day. 180 Tablet 3    budesonide-formoterol (BREYNA) 160-4.5 MCG/ACT Aerosol Inhale 2 Puffs 2 times a day. 3 Each 3    Multiple Vitamin (MULTIVITAMIN PO) Take  by mouth.      tamsulosin (FLOMAX) 0.4 MG capsule Take 0.4 mg by mouth every evening.      Omega-3 Fatty Acids (FISH OIL PO) Take 1 Capsule by mouth every day.      Ferrous Sulfate (IRON) 325 (65 Fe) MG Tab Take 325 mg by mouth every day.      levothyroxine (SYNTHROID) 88 MCG Tab Take 88 mcg by mouth Every morning on an empty stomach.      Glucosamine-Chondroit-Vit C-Mn (GLUCOSAMINE 1500 COMPLEX) Cap Take 1 Capsule by mouth every day.       No current facility-administered medications on file prior to visit.       Amiodarone, Tape, Flecainide, and Spironolactone      ROS:   Review of Systems   Constitutional:  Negative for chills, diaphoresis, fever, malaise/fatigue and weight loss.   HENT:  Negative for congestion, ear discharge, ear pain, hearing loss, nosebleeds, sinus pain, sore throat and tinnitus.    Eyes:  Negative for blurred vision, double vision, photophobia, pain, discharge and redness.   Respiratory:  Positive for wheezing. Negative for cough, hemoptysis, sputum production, shortness of breath and stridor.    Cardiovascular:  Negative for chest pain,  palpitations, orthopnea, claudication, leg swelling and PND.   Gastrointestinal:  Negative for abdominal pain, constipation, diarrhea, heartburn, nausea and vomiting.   Genitourinary:  Negative for dysuria and urgency.   Musculoskeletal:  Negative for back pain, falls, joint pain, myalgias and neck pain.   Skin:  Negative for itching and rash.   Neurological:  Negative for dizziness, tremors, speech change, focal weakness, weakness and headaches.   Endo/Heme/Allergies:  Negative for environmental allergies.   Psychiatric/Behavioral:  Negative for depression.        There were no vitals taken for this visit.  Physical Exam  Vitals reviewed.   Constitutional:       General: He is not in acute distress.     Appearance: Normal appearance.   HENT:      Head: Normocephalic and atraumatic.      Right Ear: External ear normal.      Left Ear: External ear normal.      Nose: Nose normal. No congestion.      Mouth/Throat:      Mouth: Mucous membranes are moist.      Pharynx: Oropharynx is clear. No oropharyngeal exudate.   Eyes:      General: No scleral icterus.     Extraocular Movements: Extraocular movements intact.      Conjunctiva/sclera: Conjunctivae normal.      Pupils: Pupils are equal, round, and reactive to light.   Cardiovascular:      Rate and Rhythm: Normal rate and regular rhythm.      Heart sounds: Normal heart sounds. No murmur heard.     No gallop.   Pulmonary:      Effort: Pulmonary effort is normal. No respiratory distress.      Breath sounds: Normal breath sounds. No wheezing or rales.   Abdominal:      Palpations: Abdomen is soft.   Musculoskeletal:         General: Normal range of motion.      Cervical back: Normal range of motion and neck supple.      Right lower leg: No edema.      Left lower leg: No edema.   Skin:     General: Skin is warm and dry.      Findings: No rash.   Neurological:      General: No focal deficit present.      Mental Status: He is alert and oriented to person, place, and time.       Cranial Nerves: No cranial nerve deficit.   Psychiatric:         Mood and Affect: Mood normal.         Behavior: Behavior normal.       PFTs as reviewed by me personally: as per hPI    Imaging as reviewed by me personally:  as per hPI    Assessment:  1. Dysphagia, unspecified type  DX-ESOPHAGUS - [id number]      2. Mild persistent asthma without complication        3. Atrial fibrillation, unspecified type (HCC)        4. Diastolic dysfunction            Plan:  New complaint to me. Will obtain MBS and follow up pending results. Consider GI vs speech evaluation.  Chronic, mild. Sxs well controlled. I suspect his wheezing is more upper airway and adjusting to temperature and position.   S/P ablation with NSR in clinic today  Followed by cardiology with good response to HFpEF regimen optimization  Return in about 4 months (around 9/13/2025) for modified barium swallow.

## 2025-06-05 ENCOUNTER — NON-PROVIDER VISIT (OUTPATIENT)
Dept: CARDIOLOGY | Facility: MEDICAL CENTER | Age: 80
End: 2025-06-05
Payer: MEDICARE

## 2025-06-05 PROCEDURE — 93294 REM INTERROG EVL PM/LDLS PM: CPT | Performed by: INTERNAL MEDICINE

## (undated) DEVICE — GLOVE SZ 7 BIOGEL PI MICRO - PF LF (50PR/BX 4BX/CA)

## (undated) DEVICE — TUBE CONNECTING SUCTION - CLEAR PLASTIC STERILE 72 IN (50EA/CA)

## (undated) DEVICE — WATER IRRIGATION STERILE 1000ML (12EA/CA)

## (undated) DEVICE — CANNULA W/ SUPPLY TUBING O2 - (50/CA)

## (undated) DEVICE — PACK VITRECTOMY (1EA/CA)

## (undated) DEVICE — CANISTER SUCTION RIGID RED 1500CC (40EA/CA)

## (undated) DEVICE — TUBING CLEARLINK DUO-VENT - C-FLO (48EA/CA)

## (undated) DEVICE — SUCTION INSTRUMENT YANKAUER BULBOUS TIP W/O VENT (50EA/CA)

## (undated) DEVICE — LACTATED RINGERS INJ 1000 ML - (14EA/CA 60CA/PF)

## (undated) DEVICE — TIP NEEDLE SOFT 25G X 0.8MM (10EA/BX)

## (undated) DEVICE — SODIUM CHL IRRIGATION 0.9% 1000ML (12EA/CA)

## (undated) DEVICE — GOWN WARMING STANDARD FLEX - (30/CA)

## (undated) DEVICE — SLEEVE VASO CALF MED - (10PR/CA)

## (undated) DEVICE — SET LEADWIRE 5 LEAD BEDSIDE DISPOSABLE ECG (1SET OF 5/EA)

## (undated) DEVICE — PAD EYE GAUZE COVERED OVAL 1 5/8 X 2 5/8" STERILE"

## (undated) DEVICE — CANISTER SUCTION 3000ML MECHANICAL FILTER AUTO SHUTOFF MEDI-VAC NONSTERILE LF DISP  (40EA/CA)

## (undated) DEVICE — TOWEL STOP TIMEOUT SAFETY FLAG (40EA/CA)

## (undated) DEVICE — KIT  I.V. START (100EA/CA)

## (undated) DEVICE — SENSOR OXIMETER ADULT SPO2 RD SET (20EA/BX)

## (undated) DEVICE — PACK VITRECTOMY 25G 20K V W (1EA/BX)

## (undated) DEVICE — MASK OXYGEN VNYL ADLT MED CONC WITH 7 FOOT TUBING  - (50EA/CA)

## (undated) DEVICE — SUTURE GENERAL